# Patient Record
Sex: MALE | Race: WHITE | NOT HISPANIC OR LATINO | Employment: OTHER | URBAN - METROPOLITAN AREA
[De-identification: names, ages, dates, MRNs, and addresses within clinical notes are randomized per-mention and may not be internally consistent; named-entity substitution may affect disease eponyms.]

---

## 2017-06-10 ENCOUNTER — TRANSCRIBE ORDERS (OUTPATIENT)
Dept: ADMINISTRATIVE | Facility: HOSPITAL | Age: 57
End: 2017-06-10

## 2017-06-10 ENCOUNTER — APPOINTMENT (OUTPATIENT)
Dept: LAB | Facility: HOSPITAL | Age: 57
End: 2017-06-10
Payer: COMMERCIAL

## 2017-06-10 DIAGNOSIS — Z51.81 ENCOUNTER FOR THERAPEUTIC DRUG MONITORING: Primary | ICD-10-CM

## 2017-06-10 DIAGNOSIS — Z51.81 ENCOUNTER FOR THERAPEUTIC DRUG MONITORING: ICD-10-CM

## 2017-06-10 LAB — VENIPUNCTURE: NORMAL

## 2017-06-10 PROCEDURE — 36415 COLL VENOUS BLD VENIPUNCTURE: CPT

## 2017-06-12 ENCOUNTER — GENERIC CONVERSION - ENCOUNTER (OUTPATIENT)
Dept: OTHER | Facility: OTHER | Age: 57
End: 2017-06-12

## 2017-06-22 ENCOUNTER — ALLSCRIPTS OFFICE VISIT (OUTPATIENT)
Dept: OTHER | Facility: OTHER | Age: 57
End: 2017-06-22

## 2017-07-24 ENCOUNTER — ALLSCRIPTS OFFICE VISIT (OUTPATIENT)
Dept: OTHER | Facility: OTHER | Age: 57
End: 2017-07-24

## 2017-07-26 ENCOUNTER — LAB CONVERSION - ENCOUNTER (OUTPATIENT)
Dept: OTHER | Facility: OTHER | Age: 57
End: 2017-07-26

## 2017-07-26 ENCOUNTER — GENERIC CONVERSION - ENCOUNTER (OUTPATIENT)
Dept: OTHER | Facility: OTHER | Age: 57
End: 2017-07-26

## 2017-07-26 LAB
A/G RATIO (HISTORICAL): 1.6 (CALC) (ref 1–2.5)
ALBUMIN SERPL BCP-MCNC: 4.3 G/DL (ref 3.6–5.1)
ALP SERPL-CCNC: 52 U/L (ref 40–115)
ALT SERPL W P-5'-P-CCNC: 15 U/L (ref 9–46)
AST SERPL W P-5'-P-CCNC: 14 U/L (ref 10–35)
BASOPHILS # BLD AUTO: 1.3 %
BASOPHILS # BLD AUTO: 94 CELLS/UL (ref 0–200)
BILIRUB SERPL-MCNC: 0.7 MG/DL (ref 0.2–1.2)
BUN SERPL-MCNC: 10 MG/DL (ref 7–25)
BUN/CREA RATIO (HISTORICAL): NORMAL (CALC) (ref 6–22)
CALCIUM SERPL-MCNC: 9.5 MG/DL (ref 8.6–10.3)
CHLORIDE SERPL-SCNC: 106 MMOL/L (ref 98–110)
CHOLEST SERPL-MCNC: 249 MG/DL (ref 125–200)
CHOLEST/HDLC SERPL: 7.8 (CALC)
CO2 SERPL-SCNC: 25 MMOL/L (ref 20–31)
CREAT SERPL-MCNC: 0.92 MG/DL (ref 0.7–1.33)
DEPRECATED RDW RBC AUTO: 14.1 % (ref 11–15)
EGFR AFRICAN AMERICAN (HISTORICAL): 106 ML/MIN/1.73M2
EGFR-AMERICAN CALC (HISTORICAL): 91 ML/MIN/1.73M2
EOSINOPHIL # BLD AUTO: 295 CELLS/UL (ref 15–500)
EOSINOPHIL # BLD AUTO: 4.1 %
GAMMA GLOBULIN (HISTORICAL): 2.7 G/DL (CALC) (ref 1.9–3.7)
GLUCOSE (HISTORICAL): 95 MG/DL (ref 65–99)
HCT VFR BLD AUTO: 42.6 % (ref 38.5–50)
HDLC SERPL-MCNC: 32 MG/DL
HGB BLD-MCNC: 13.9 G/DL (ref 13.2–17.1)
LDL CHOLESTEROL (HISTORICAL): 147 MG/DL (CALC)
LYME IGG/IGM AB (HISTORICAL): <0.9 INDEX
LYMPHOCYTES # BLD AUTO: 2743 CELLS/UL (ref 850–3900)
LYMPHOCYTES # BLD AUTO: 38.1 %
MCH RBC QN AUTO: 29.1 PG (ref 27–33)
MCHC RBC AUTO-ENTMCNC: 32.6 G/DL (ref 32–36)
MCV RBC AUTO: 89.1 FL (ref 80–100)
MONOCYTES # BLD AUTO: 634 CELLS/UL (ref 200–950)
MONOCYTES (HISTORICAL): 8.8 %
NEUTROPHILS # BLD AUTO: 3434 CELLS/UL (ref 1500–7800)
NEUTROPHILS # BLD AUTO: 47.7 %
NON-HDL-CHOL (CHOL-HDL) (HISTORICAL): 217 MG/DL (CALC)
PLATELET # BLD AUTO: 312 THOUSAND/UL (ref 140–400)
PMV BLD AUTO: 10.1 FL (ref 7.5–12.5)
POTASSIUM SERPL-SCNC: 4.2 MMOL/L (ref 3.5–5.3)
PROSTATE SPECIFIC ANTIGEN TOTAL (HISTORICAL): 3 NG/ML
RBC # BLD AUTO: 4.78 MILLION/UL (ref 4.2–5.8)
SODIUM SERPL-SCNC: 140 MMOL/L (ref 135–146)
TOTAL PROTEIN (HISTORICAL): 7 G/DL (ref 6.1–8.1)
TRIGL SERPL-MCNC: 348 MG/DL
WBC # BLD AUTO: 7.2 THOUSAND/UL (ref 3.8–10.8)

## 2017-08-31 ENCOUNTER — ALLSCRIPTS OFFICE VISIT (OUTPATIENT)
Dept: OTHER | Facility: OTHER | Age: 57
End: 2017-08-31

## 2017-11-10 ENCOUNTER — ALLSCRIPTS OFFICE VISIT (OUTPATIENT)
Dept: OTHER | Facility: OTHER | Age: 57
End: 2017-11-10

## 2017-11-11 NOTE — PROGRESS NOTES
Assessment    1  Acute sinusitis (461 9) (J01 90)    Plan  Acute sinusitis    · Sulfamethoxazole-Trimethoprim 800-160 MG Oral Tablet (Bactrim DS); TAKE 1TABLET TWICE DAILY    Chief Complaint  Sinus congestion at times harsh cough headache achy no fever rmklpn      History of Present Illness  HPI: Pt states he has been spitting up phlem states his sinuses have been hurtinghe has been sick 5 days chills have been bad no fever at this time  Review of Systems   Constitutional: feeling poorly, but-- as noted in HPI   ENT: nasal discharge-- and-- hoarseness, but-- as noted in HPI  Cardiovascular: no complaints of slow or fast heart rate, no chest pain, no palpitations, no leg claudication or lower extremity edema  Respiratory: cough, but-- as noted in HPI  Gastrointestinal: no complaints of abdominal pain, no constipation, no nausea or vomiting, no diarrhea or bloody stools  Genitourinary: no complaints of dysuria or incontinence, no hesitancy, no nocturia, no genital lesion, no inadequacy of penile erection  Active Problems  1  Acute Tear Of Left Rotator Cuff Tendon (726 19)   2  Adrenal adenoma (227 0) (D35 00)   3  Allergic rhinitis (477 9) (J30 9)   4  Backache (724 5) (M54 9)   5  Benign essential hypertension (401 1) (I10)   6  Bicipital tendinitis of left shoulder (726 12) (M75 22)   7  Diarrhea (787 91) (R19 7)   8  Encounter for medication monitoring (V58 83) (Z51 81)   9  Encounter for pre-employment health screening examination (V70 5) (Z02 1)   10  Erythema migrans (Lyme disease) (088 81) (A69 20)   11  Fatigue (780 79) (R53 83)   12  OSITO (generalized anxiety disorder) (300 02) (F41 1)   13  Hypercholesterolemia (272 0) (E78 00)   14  Keloid of skin (701 4) (L91 0)   15  Nonallopathic lesion of lumbar region (739 3) (M99 9)   16  Palpitations (785 1) (R00 2)   17  Plantar fasciitis (728 71) (M72 2)   18  Prostate hyperplasia without urinary obstruction (600 90) (N40 0)   19   Recurrent major depressive disorder (296 30) (F33 9)   20  Rotator cuff tendinitis, left (726 10) (M11 82)   21  Shoulder joint pain, unspecified laterality   22  Shoulder Strain (840 9)   23  Visit for pre-operative examination (V72 84) (S45 791)    Past Medical History  1  History of Anxiety (300 00) (F41 9)   2  History of depression (V11 8) (Z86 59)   3  History of Inguinal hernia (550 90) (K40 90)   4  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)  Active Problems And Past Medical History Reviewed: The active problems and past medical history were reviewed and updated today  Family History  Father    1  Family history of Depression  Maternal Aunt    2  Family history of Pure Hypercholesterolemia   3  Family history of Stroke Syndrome (V17 1)  Family History    4  Family history of Cervical Cancer  Family History Reviewed: The family history was reviewed and updated today  Social History   · Being A Social Drinker   · Current Every Day Smoker (305 1)   · Current Some Day Smoker (305 1)   · Daily Coffee Consumption (___ Cups/Day)  The social history was reviewed and updated today  Surgical History    1  History of Knee Surgery  Surgical History Reviewed: The surgical history was reviewed and updated today  Current Meds   1  ClonazePAM 2 MG Oral Tablet; One tab twice daily; Therapy: 34WNC2733 to (Last Tomi Schaffer)  Requested for: 07WJZ4692 Ordered   2  DULoxetine HCl - 60 MG Oral Capsule Delayed Release Particles; TAKE ONE CAPSULE BY MOUTH ONE TIME DAILY; Therapy: 40GJO8953 to (Evaluate:43Tqj0764)  Requested for: 33QVR9426; Last Rx:22Jun2017 Ordered   3  Lithium Carbonate  MG Oral Tablet Extended Release; Take 1 tablet twice daily; Therapy: 68PZD6087 to (Last Tomi Schaffer)  Requested for: 55LCR9571 Ordered   4  QUEtiapine Fumarate 200 MG Oral Tablet; 1 5 tablets at bedtime; Therapy: 55DAT4423 to (Last Rx:25Oct2017)  Requested for: 25Oct2017 Ordered    Allergies  1  AMOXICILLIN   2   Advil TABS    Vitals   Recorded: 11VYG1934 01:30PM   Temperature 97 2 F   Heart Rate 78   Respiration 18   Systolic 117   Diastolic 80   Height 5 ft 11 in   Weight 234 lb    BMI Calculated 32 64   BSA Calculated 2 25       Physical Exam   Constitutional  General appearance: No acute distress, well appearing and well nourished  Eyes  Conjunctiva and lids: No swelling, erythema, or discharge  Pupils and irises: Equal, round and reactive to light  Ears, Nose, Mouth, and Throat  External inspection of ears and nose: Normal    Otoscopic examination: Abnormal   The right tympanic membrane was bulging  The left tympanic membrane was bulging  Nasal mucosa, septum, and turbinates: Abnormal   The bilateral nasal mucosa was red  Oropharynx: Abnormal   The posterior pharynx was erythematous  Pulmonary  Auscultation of lungs: Clear to auscultation, equal breath sounds bilaterally, no wheezes, no rales, no rhonci  Cardiovascular  Auscultation of heart: Normal rate and rhythm, normal S1 and S2, without murmurs  Abdomen  Abdomen: Non-tender, no masses           Future Appointments    Date/Time Provider Specialty Site   12/04/2017 04:10 PM Rianna Holly MD Psychiatry ST [address] [place] [address]       Signatures   Electronically signed by : Judson Aaron DO; Nov 10 2017  1:40PM EST                       (Author)

## 2018-01-11 NOTE — PSYCH
Psych Med Mgmt    Appearance: was calm and cooperative and good eye contact  Observed mood: euthymic  Observed mood: affect was broad  Speech: a normal rate  Thought processes: coherent/organized  Hallucinations: no hallucinations present  Thought Content: no delusions  Abnormal Thoughts: The patient has no suicidal thoughts  Orientation: The patient is oriented to person, place and time  Attention Span And Concentration: concentration intact  Insight: Insight intact  Treatment Recommendations: ACC/AHA Risk assessment tool for stroke was discussed with the patient  His score was 10 5  Continue with exercise  Discuss pharmacotherapy for hypercholesterolemia with primary care physician  Check lithium level  Return to the office in a few months  Risks, Benefits And Possible Side Effects Of Medications: Risks, benefits, and possible side effects of medications explained to patient and patient verbalizes understanding  He reports normal appetite, normal energy level, no weight change and normal number of sleep hours  The patient was seen for anxiety and depression  He spent several months in [place] with his wife and as a result could not keep his appointment in September but he has been doing well  He is sleeps restfully but he needs to take the Seroquel  He has a good appetite but has not been able to lose weight despite exercising a lot  His mood has been stable  Vitals  Signs   Recorded: 28Dec2016 02:54PM   Heart Rate: 638  Systolic: 706  Diastolic: 86  Weight: 857 lb   BMI Calculated: 31 66  BSA Calculated: 2 23    Assessment    1  OSITO (generalized anxiety disorder) (300 02) (F41 1)   2  Recurrent major depressive disorder (296 30) (F33 9)    Plan    1  (1) LITHIUM; Status:Active; Requested for:28Dec2016;     2  ClonazePAM 2 MG Oral Tablet; One tab twice daily    3  Lithium Carbonate  MG Oral Tablet Extended Release; Take 1 tablet twice   daily   4   DULoxetine HCl - 60 MG Oral Capsule Delayed Release Particles; TAKE ONE   CAPSULE BY MOUTH ONE TIME DAILY   5  QUEtiapine Fumarate 400 MG Oral Tablet; TAKE 1 TABLET AT BEDTIME    Review of Systems    Constitutional: No fever, no chills, feels well, no tiredness, no recent weight gain or loss  Cardiovascular: no complaints of slow or fast heart rate, no chest pain, no palpitations  Respiratory: no complaints of shortness of breath, no wheezing, no dyspnea on exertion  Gastrointestinal: no complaints of abdominal pain, no constipation, no nausea, no diarrhea, no vomiting  Genitourinary: no complaints of dysuria, no incontinence, no pelvic pain, no urinary frequency  Musculoskeletal: no complaints of arthralgia, no myalgias, no limb pain, no joint stiffness  Active Problems    1  Acute Tear Of Left Rotator Cuff Tendon (726 19)   2  Adrenal adenoma (227 0) (D35 00)   3  Allergic rhinitis (477 9) (J30 9)   4  Backache (724 5) (M54 9)   5  Benign essential hypertension (401 1) (I10)   6  Bicipital tendinitis of left shoulder (726 12) (M75 22)   7  Diarrhea (787 91) (R19 7)   8  Encounter for medication monitoring (V58 83) (Z51 81)   9  Encounter for pre-employment health screening examination (V70 5) (Z02 1)   10  Fatigue (780 79) (R53 83)   11  OSITO (generalized anxiety disorder) (300 02) (F41 1)   12  Hypercholesterolemia (272 0) (E78 00)   13  Nonallopathic lesion of lumbar region (739 3) (M99 9)   14  Palpitations (785 1) (R00 2)   15  Prostate hyperplasia without urinary obstruction (600 90) (N40 0)   16  Recurrent major depressive disorder (296 30) (F33 9)   17  Rotator cuff tendinitis, left (726 10) (M75 82)   18  Shoulder joint pain, unspecified laterality   19  Shoulder Strain (840 9)   20  Visit for pre-operative examination (V72 84) (M45 555)    Past Medical History    1  History of Anxiety (300 00) (F41 9)   2  History of depression (V11 8) (Z86 59)   3  History of Inguinal hernia (550 90) (K40 90)   4   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)    Allergies    1  AMOXICILLIN   2  Advil TABS    Current Meds   1  Amlodipine Besy-Benazepril HCl - 10-40 MG Oral Capsule; 1 every day; Therapy: 16ZTT8754 to (Last Rx:07Yzm7842)  Requested for: 12Lhh1553 Ordered   2  ClonazePAM 2 MG Oral Tablet; One tab twice daily; Therapy: 46WDV0154 to (Last Rx:24Got8424)  Requested for: 29TKE2723 Ordered   3  DULoxetine HCl - 60 MG Oral Capsule Delayed Release Particles; TAKE ONE CAPSULE   BY MOUTH ONE TIME DAILY; Therapy: 21XOY3610 to (Evaluate:15Jan2017)  Requested for: 01OEZ8781; Last   Rx:46Qyb8573 Ordered   4  QUEtiapine Fumarate 400 MG Oral Tablet; TAKE 1 TABLET AT BEDTIME  Requested for:   35DPM0857; Last Rx:19Dei1128 Ordered    Family Psych History  Father    1  Family history of Depression  Maternal Aunt    2  Family history of Pure Hypercholesterolemia   3  Family history of Stroke Syndrome (V17 1)  Family History    4  Family history of Cervical Cancer    Social History    · Being A Social Drinker   · Current Every Day Smoker (305 1)   · Current Some Day Smoker (305 1)   · Daily Coffee Consumption (___ Cups/Day)    End of Encounter Meds    1  Amlodipine Besy-Benazepril HCl - 10-40 MG Oral Capsule; 1 every day; Therapy: 12RPJ8233 to (Last Rx:87Ctw7967)  Requested for: 66Qpq6453 Ordered    2  ClonazePAM 2 MG Oral Tablet; One tab twice daily; Therapy: 41GMS5489 to (Last Rx:05Eqi0691)  Requested for: 86Hrm4288 Ordered    3  DULoxetine HCl - 60 MG Oral Capsule Delayed Release Particles; TAKE ONE CAPSULE   BY MOUTH ONE TIME DAILY; Therapy: 66QNS6998 to (Evaluate:26Jun2017)  Requested for: 95Wno7947; Last   Rx:03Rti2634 Ordered   4  Lithium Carbonate  MG Oral Tablet Extended Release; Take 1 tablet twice daily; Therapy: 36Mwv4455 to (Last Rx:77Cqc4350)  Requested for: 44Srv2745 Ordered   5   QUEtiapine Fumarate 400 MG Oral Tablet; TAKE 1 TABLET AT BEDTIME  Requested for:   10Qfg8409; Last Rx:50Mmk8982 Ordered    Signatures   Electronically signed by : Elvis Novak MD; Dec 28 2016  3:01PM EST                       (Author)

## 2018-01-11 NOTE — MISCELLANEOUS
Message   Recorded as Task   Date: 05/10/2016 10:57 AM, Created By: Richa Barry   Task Name: Call Back   Assigned To: Wesley Rolon   Regarding Patient: William Kat, Status: Active   CommentGwyneliud Phillip - 10 May 2016 10:57 AM     TASK CREATED    Can pt increase cymbolta, is on 60mg now 67 788 02 83 or  is spouse Delgado Roldan - 10 May 2016 3:45 PM     TASK EDITED   spoke with pt  has a lot of anxiety and sadness  variable sleep pattern and appetite  no suicidal thoughts  some crying spells  The patient is currently on Cymbalta 60 mg daily and Seroquel 300 mg at at bedtime   I instructed him to take 450 mg of Seroquel and call me next week      Plan  Recurrent major depressive disorder    · DULoxetine HCl - 60 MG Oral Capsule Delayed Release Particles; TAKE ONE  CAPSULE BY MOUTH ONE TIME DAILY    Signatures   Electronically signed by : Ev Griggs MD; May 11 2016  4:46PM EST                       (Author)

## 2018-01-12 VITALS
SYSTOLIC BLOOD PRESSURE: 126 MMHG | RESPIRATION RATE: 16 BRPM | HEART RATE: 82 BPM | BODY MASS INDEX: 31.92 KG/M2 | DIASTOLIC BLOOD PRESSURE: 82 MMHG | WEIGHT: 228 LBS | HEIGHT: 71 IN | TEMPERATURE: 98.8 F

## 2018-01-12 NOTE — RESULT NOTES
Verified Results  (Q) CULTURE, STOOL, SAL/SHIG/CAMPY AND SHIGA TOXINS EIA W/RFL E COLI O157 CULT [id number] 09:28AM Jocelyn Silvestre     Test Name Result Flag Reference   SHIGA TOXINS, EIA W/RFL$TO E COLI O157 CULTURE      SHIGA TOXINS, EIA W/RFL TO E COLI O157 CULTURE         MICRO NUMBER:      [id number]    TEST STATUS:       FINAL    SPECIMEN SOURCE:   STOOL    SPECIMEN QUALITY:  ADEQUATE    RESULT:            Not Detected   CULTURE, STOOL, ZAIN/SHIG/CAMPY AND SHIGA TOXINS EIA W/RFL E COLI O157 CULT      CAMPYLOBACTER, CULTURE         MICRO NUMBER:      [id number]    TEST STATUS:       FINAL    SPECIMEN SOURCE:   STOOL    SPECIMEN QUALITY:  ADEQUATE    RESULT:            No enteric Campylobacter isolated   CULTURE, STOOL, ZAIN/SHIG/CAMPY AND SHIGA TOXINS EIA W/RFL E COLI O157 CULT      SALMONELLA AND SHIGELLA, CULTURE         MICRO NUMBER:      [id number]    TEST STATUS:       FINAL    SPECIMEN SOURCE:   STOOL    SPECIMEN QUALITY:  ADEQUATE    RESULT:            No Salmonella or Shigella isolated

## 2018-01-12 NOTE — MISCELLANEOUS
Message  Pt called and frustrated he is "no better" despite cymbalta and seroquel  No SI  WIll increase cymbalta to 90 mg total per ay and continue seroquel- has follow up in 2 weeks with Dr Shanae Vasquez        Plan  Recurrent major depressive disorder    · From  DULoxetine HCl - 30 MG Oral Capsule Delayed Release Particles Take  one capsule a day for 7 days To DULoxetine HCl - 30 MG Oral Capsule Delayed  Release Particles take one capsule po qd   · DULoxetine HCl - 60 MG Oral Capsule Delayed Release Particles; TAKE ONE  CAPSULE BY MOUTH ONE TIME DAILY    Signatures   Electronically signed by : Mumtaz Luna, [facility]; May 27 2016  4:23PM EST                       (Author)

## 2018-01-12 NOTE — PROGRESS NOTES
Assessment    1  Inguinal hernia (550 90) (K40 90)    Plan  Inguinal hernia    · Follow-up visit in 3 months Evaluation and Treatment  Follow-up  Status: Hold For -  Scheduling  Requested for: 21Jan2016   Ordered; For: Inguinal hernia; Ordered By: Cielo Campbell Performed:  Due: 69SDX8465  Mild depressive disorder    · Mirtazapine 15 MG Oral Tablet; TAKE 1 TABLET Bedtime   Rx By: Marii Mccabe; Dispense: 30 Days ; #:30 Tablet; Refill: 2; For: Mild depressive disorder; RICKIE = N; Verified Transmission to [facility] Last Updated By: System, [employer]; 1/20/2016 4:15:09 PM   · QUEtiapine Fumarate 200 MG Oral Tablet; TAKE 1 TABLET AT BEDTIME   Rx By: Marii Mccabe; Dispense: 30 Days ; #:30 Tablet; Refill: 2; For: Mild depressive disorder; RICKIE = N; Verified Transmission to [facility]9212 Last Updated By: System, [employer]; 1/20/2016 4:15:09 PM  PMH: Anxiety    · From  ClonazePAM 1 MG Oral Tablet 2 tab BID To ClonazePAM 2 MG Oral  Tablet One tab twice daily   Rx By: Marii Mccabe; Dispense: 0 Days ; #:60 Tablet; Refill: 2; For: PMH: Anxiety; RICKIE = N; Print Rx    Discussion/Summary  Discussion Summary:   Alice Diaz comes today after having right and left inguinal hernia repairs in October 2015  He is doing well  His physical examination is normal  He looks excellent  He is okay to resume all activity including going to the gymnasium  He has had an excellent postoperative recovery  He will call for any problems  Chief Complaint  Chief Complaint Free Text Note Form: Patient presents S/P left and right hernia 10/02/2015 and 10/09/2015  LB,CMA      History of Present Illness  HPI: Is of had a left and right inguinal hernia repairs in October of 2015  He comes today for his final check  He is doing well  He is retired  He does not do much heavy lifting  He is doing well he is having no problems  He is in good health otherwise his medications are reviewed        Review of Systems  Complete-Male: Constitutional: No fever or chills, feels well, no tiredness, no recent weight gain or weight loss  Eyes: No complaints of eye pain, no red eyes, no discharge from eyes, no itchy eyes  ENT: no complaints of earache, no hearing loss, no nosebleeds, no nasal discharge, no sore throat, no hoarseness  Cardiovascular: No complaints of slow heart rate, no fast heart rate, no chest pain, no palpitations, no leg claudication, no lower extremity and as noted in HPI  Respiratory: No complaints of shortness of breath, no wheezing, no cough, no SOB on exertion, no orthopnea or PND  Gastrointestinal: as noted in HPI  Genitourinary: No complaints of dysuria, no incontinence, no hesitancy, no nocturia, no genital lesion, no testicular pain  Musculoskeletal: No complaints of arthralgia, no myalgias, no joint swelling or stiffness, no limb pain or swelling  Integumentary: No complaints of skin rash or skin lesions, no itching, no skin wound, no dry skin  Neurological: No compliants of headache, no confusion, no convulsions, no numbness or tingling, no dizziness or fainting, no limb weakness, no difficulty walking  Psychiatric: Is not suicidal, no sleep disturbances, no anxiety or depression, no change in personality, no emotional problems  Endocrine: No complaints of proptosis, no hot flashes, no muscle weakness, no erectile dysfunction, no deepening of the voice, no feelings of weakness  Hematologic/Lymphatic: No complaints of swollen glands, no swollen glands in the neck, does not bleed easily, no easy bruising  Active Problems    1  Acute Tear Of Left Rotator Cuff Tendon (726 19)   2  Adrenal adenoma (227 0) (D35 00)   3  Allergic rhinitis (477 9) (J30 9)   4  Backache (724 5) (M54 9)   5  Benign essential hypertension (401 1) (I10)   6  Bicipital tendinitis of left shoulder (726 12) (M75 22)   7  Diarrhea (787 91) (R19 7)   8   Encounter for pre-employment health screening examination (V70 5) (Z02 1)   9  Fatigue (780 79) (R53 83)   10  OSITO (generalized anxiety disorder) (300 02) (F41 1)   11  Hypercholesterolemia (272 0) (E78 0)   12  Inguinal hernia (550 90) (K40 90)   13  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)   14  Nonallopathic lesion of lumbar region (739 3) (M99 03)   15  Palpitations (785 1) (R00 2)   16  Prostate hyperplasia without urinary obstruction (600 90) (N40 0)   17  Rotator cuff tendinitis, left (726 10) (M75 82)   18  Shoulder joint pain, unspecified laterality (719 41) (M25 519)   19  Shoulder Strain (840 9)   20  Visit for pre-operative examination (V72 84) (K44 425)    Past Medical History    1  History of Anxiety (300 00) (F41 9)   2  History of depression (V11 8) (Z86 59)    Surgical History    1  History of Knee Surgery    Family History    1  Family history of Depression    2  Family history of Hypercholesterolemia   3  Family history of Stroke Syndrome (V17 1)    4  Family history of Cervical Cancer    Social History    · Being A Social Drinker   · Current Every Day Smoker (305 1)   · Current Some Day Smoker (305 1)   · Daily Coffee Consumption (___ Cups/Day)    Current Meds   1  Amlodipine Besy-Benazepril HCl - 10-40 MG Oral Capsule; 1 every day; Therapy: 90DKK5043 to (Last Rx:33Ola6234)  Requested for: 37Nct2030 Ordered   2  ClonazePAM 1 MG Oral Tablet; 2 tab BID; Therapy: 65JDN7870-(GAOYOFVT:99LRJ2232)  Requested for: 23Okh7911 Ordered   3  Mirtazapine 30 MG Oral Tablet; TAKE 1 TABLET AT BEDTIME Recorded   4  QUEtiapine Fumarate 300 MG Oral Tablet; TAKE 1 TABLET AT BEDTIME Recorded  Medication List Reviewed: The medication list was reviewed and updated today  Allergies    1  AMOXICILLIN   2   Advil TABS    Vitals  Vital Signs [Data Includes: Current Encounter]    Recorded: 21Jan2016 01:56PM   Temperature 97 9 F   Heart Rate 98   Systolic 368   Diastolic 70   Height 5 ft 11 in   Weight 218 lb 6 08 oz   BMI Calculated 30 46   BSA Calculated 2 18   O2 Saturation 95 Pain Scale 0     Physical Exam    Abdomen   Abdomen: Non-tender, no masses  Hernia sites well-healed wound clean no evidence of recurrence  Signatures   Electronically signed by :  John Busby MD; Jan 21 2016  2:11PM EST                       (Author)

## 2018-01-12 NOTE — PSYCH
Psych Med Mgmt    Appearance: was calm and cooperative and good eye contact  Observed mood: euthymic  Observed mood: affect was broad  Speech: a normal rate  Thought processes: coherent/organized  Hallucinations: no hallucinations present  Thought Content: no delusions  Abnormal Thoughts: The patient has no suicidal thoughts and no homicidal thoughts  Orientation: The patient is oriented to person, place and time  Attention Span And Concentration: concentration intact  Insight: Insight intact  Judgment: His judgment was intact  Muscle Strength And Tone  Muscle strength and tone were normal  Normal gait and station  The patient is experiencing no localized pain  Treatment Recommendations:   clonazePAM 1 mg BID   DULoxetine 60 mg Daily   lithium carbonate 450 mg Q12H   QUEtiapine 400 mg HS     Risks, Benefits And Possible Side Effects Of Medications: Risks, benefits, and possible side effects of medications explained to patient and patient verbalizes understanding  He reports normal appetite, normal energy level, no weight change and normal number of sleep hours  The patient was seen for pharmacotherapy of recurrent depression and generalized anxiety disorder  Since he was discharged from the hospital a few weeks ago, he has continued to improve  His level of anxiety is low  He has been sleeping and eating well  No suicidal ideation  He gets along well with his wife  He has not had any side effects with his medications and seems to be in remission  Most recent lithium level from 2 weeks ago was 0 6  Assessment    1  OSITO (generalized anxiety disorder) (300 02) (F41 1)   2  Recurrent major depressive disorder (296 30) (F33 9)    Plan    1  ClonazePAM 2 MG Oral Tablet; One tab twice daily    2  DULoxetine HCl - 30 MG Oral Capsule Delayed Release Particles   3   From  QUEtiapine Fumarate 200 MG Oral Tablet One twice daily To   QUEtiapine Fumarate 400 MG Oral Tablet TAKE 1 TABLET AT BEDTIME   4  DULoxetine HCl - 60 MG Oral Capsule Delayed Release Particles; TAKE ONE   CAPSULE BY MOUTH ONE TIME DAILY    Review of Systems    Constitutional: No fever, no chills, feels well, no tiredness, no recent weight gain or loss  Cardiovascular: no complaints of slow or fast heart rate, no chest pain, no palpitations  Respiratory: no complaints of shortness of breath, no wheezing, no dyspnea on exertion  Gastrointestinal: no complaints of abdominal pain, no constipation, no nausea, no diarrhea, no vomiting  Genitourinary: no complaints of dysuria, no incontinence, no pelvic pain, no urinary frequency  Active Problems    1  Acute Tear Of Left Rotator Cuff Tendon (726 19)   2  Adrenal adenoma (227 0) (D35 00)   3  Allergic rhinitis (477 9) (J30 9)   4  Backache (724 5) (M54 9)   5  Benign essential hypertension (401 1) (I10)   6  Bicipital tendinitis of left shoulder (726 12) (M75 22)   7  Diarrhea (787 91) (R19 7)   8  Encounter for medication monitoring (V58 83) (Z51 81)   9  Encounter for pre-employment health screening examination (V70 5) (Z02 1)   10  Fatigue (780 79) (R53 83)   11  OSITO (generalized anxiety disorder) (300 02) (F41 1)   12  Hypercholesterolemia (272 0) (E78 0)   13  Nonallopathic lesion of lumbar region (739 3) (M99 9)   14  Palpitations (785 1) (R00 2)   15  Prostate hyperplasia without urinary obstruction (600 90) (N40 0)   16  Recurrent major depressive disorder (296 30) (F33 9)   17  Rotator cuff tendinitis, left (726 10) (M75 82)   18  Shoulder joint pain, unspecified laterality   19  Shoulder Strain (840 9)   20  Visit for pre-operative examination (V72 84) (C53 404)    Past Medical History    1  History of Anxiety (300 00) (F41 9)   2  History of depression (V11 8) (Z86 59)   3  History of Inguinal hernia (550 90) (K40 90)   4  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)    Allergies    1  AMOXICILLIN   2  Advil TABS    Current Meds   1   Amlodipine Besy-Benazepril HCl - 10-40 MG Oral Capsule; 1 every day; Therapy: 39XAV6399 to (Last Rx:61Agd6936)  Requested for: 63Clk2539 Ordered   2  ClonazePAM 2 MG Oral Tablet; One tab twice daily; Therapy: 33PEN3822 to (Last Rx:23Mar2016)  Requested for: 49ETX1895 Ordered   3  DULoxetine HCl - 30 MG Oral Capsule Delayed Release Particles; take one capsule po   qd; Therapy: 85SVO0574 to (Last Rx:70Iwy5135)  Requested for: 41GQK5212 Ordered   4  DULoxetine HCl - 60 MG Oral Capsule Delayed Release Particles; TAKE ONE CAPSULE   BY MOUTH ONE TIME DAILY; Therapy: 53CDM5793 to (Evaluate:60Jqe2232)  Requested for: 35Bgb4988; Last   Rx:39Qkl6458 Ordered   5  QUEtiapine Fumarate 200 MG Oral Tablet; One twice daily  Requested for: 53XSL9819;   Last Rx:44Gzm1476 Ordered    Family Psych History  Father    1  Family history of Depression  Maternal Aunt    2  Family history of Pure Hypercholesterolemia   3  Family history of Stroke Syndrome (V17 1)  Family History    4  Family history of Cervical Cancer    Social History    · Being A Social Drinker   · Current Every Day Smoker (305 1)   · Current Some Day Smoker (305 1)   · Daily Coffee Consumption (___ Cups/Day)    End of Encounter Meds    1  Amlodipine Besy-Benazepril HCl - 10-40 MG Oral Capsule; 1 every day; Therapy: 19TMP4012 to (Last Rx:42Inz9165)  Requested for: 38Rav6693 Ordered    2  ClonazePAM 2 MG Oral Tablet; One tab twice daily; Therapy: 62GRF0010 to (Last Rx:63Gvu5784)  Requested for: 74MMT7859 Ordered    3  DULoxetine HCl - 60 MG Oral Capsule Delayed Release Particles; TAKE ONE CAPSULE   BY MOUTH ONE TIME DAILY; Therapy: 35CNC7832 to (Evaluate:60Fda5901)  Requested for: 43IZP0329; Last   Rx:48Cbm7434 Ordered   4   QUEtiapine Fumarate 400 MG Oral Tablet; TAKE 1 TABLET AT BEDTIME  Requested for:   63GZZ4230; Last Rx:45Lhn1414 Ordered    Signatures   Electronically signed by : Fay Nicholson MD; Jul 19 2016  2:46PM EST                       (Author)

## 2018-01-13 VITALS
BODY MASS INDEX: 32.06 KG/M2 | HEIGHT: 71 IN | DIASTOLIC BLOOD PRESSURE: 96 MMHG | TEMPERATURE: 98.2 F | HEART RATE: 82 BPM | WEIGHT: 229 LBS | SYSTOLIC BLOOD PRESSURE: 124 MMHG | RESPIRATION RATE: 18 BRPM

## 2018-01-13 VITALS
SYSTOLIC BLOOD PRESSURE: 136 MMHG | BODY MASS INDEX: 32.76 KG/M2 | WEIGHT: 234 LBS | DIASTOLIC BLOOD PRESSURE: 80 MMHG | HEART RATE: 78 BPM | RESPIRATION RATE: 18 BRPM | TEMPERATURE: 97.2 F | HEIGHT: 71 IN

## 2018-01-13 NOTE — PSYCH
Psych Med Mgmt    Appearance: was calm and cooperative, adequate hygiene and grooming and good eye contact  Observed mood: euthymic, but mood appropriate  Observed mood: affect was broad, but affect appropriate  Speech: a normal rate and fluent  Thought processes: coherent/organized  Hallucinations: no hallucinations present  Thought Content: no delusions  Abnormal Thoughts: The patient has no suicidal thoughts and no homicidal thoughts  Orientation: The patient is oriented to person, place and time  Recent and Remote Memory: short term memory intact and long term memory intact  Insight: Insight intact  Judgment: His judgment was intact  Muscle Strength And Tone  Muscle strength and tone were normal  Normal gait and station  Language: no difficulty naming common objects  Treatment Recommendations: same meds  RTO in 4 month  Risks, Benefits And Possible Side Effects Of Medications: Risks, benefits, and possible side effects of medications explained to patient and patient verbalizes understanding  He reports normal appetite, normal energy level, no weight change and normal number of sleep hours  The patient was seen for continuing care and pharmacotherapy of major depression  He has been doing very well and does not offer any complaints  Sleep and appetite have been normal  He has not had any suicidal ideation  Since he has been feeling better, he has cut back on quetiapine and he takes only 300 mg at bedtime  Assessment    1  OSITO (generalized anxiety disorder) (300 02) (F41 1)   2  Recurrent major depressive disorder (296 30) (F33 9)    Plan    1  ClonazePAM 2 MG Oral Tablet; One tab twice daily    2  DULoxetine HCl - 60 MG Oral Capsule Delayed Release Particles; TAKE ONE   CAPSULE BY MOUTH ONE TIME DAILY   3  Lithium Carbonate  MG Oral Tablet Extended Release; Take 1 tablet   twice daily   4   QUEtiapine Fumarate 200 MG Oral Tablet; 1 5 tablets at bedtime    Review of Systems    Cardiovascular: no complaints of slow or fast heart rate, no chest pain, no palpitations  Respiratory: no complaints of shortness of breath, no wheezing, no dyspnea on exertion  Gastrointestinal: stomach discomfort  Genitourinary: no complaints of dysuria, no incontinence, no pelvic pain, no urinary frequency  Musculoskeletal: no complaints of arthralgia, no myalgias, no limb pain, no joint stiffness  Integumentary: no complaints of skin rash, no itching, no dry skin  Neurological: no complaints of headache, no confusion, no numbness, no dizziness  Active Problems    1  Acute Tear Of Left Rotator Cuff Tendon (726 19)   2  Adrenal adenoma (227 0) (D35 00)   3  Allergic rhinitis (477 9) (J30 9)   4  Backache (724 5) (M54 9)   5  Benign essential hypertension (401 1) (I10)   6  Bicipital tendinitis of left shoulder (726 12) (M75 22)   7  Diarrhea (787 91) (R19 7)   8  Encounter for medication monitoring (V58 83) (Z51 81)   9  Encounter for pre-employment health screening examination (V70 5) (Z02 1)   10  Fatigue (780 79) (R53 83)   11  OSITO (generalized anxiety disorder) (300 02) (F41 1)   12  Hypercholesterolemia (272 0) (E78 00)   13  Nonallopathic lesion of lumbar region (739 3) (M99 9)   14  Palpitations (785 1) (R00 2)   15  Prostate hyperplasia without urinary obstruction (600 90) (N40 0)   16  Recurrent major depressive disorder (296 30) (F33 9)   17  Rotator cuff tendinitis, left (726 10) (M75 82)   18  Shoulder joint pain, unspecified laterality   19  Shoulder Strain (840 9)   20  Visit for pre-operative examination (V72 84) (O20 456)    Past Medical History    1  History of Anxiety (300 00) (F41 9)   2  History of depression (V11 8) (Z86 59)   3  History of Inguinal hernia (550 90) (K40 90)   4  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)    Allergies    1  AMOXICILLIN   2  Advil TABS    Current Meds   1   Amlodipine Besy-Benazepril HCl - 10-40 MG Oral Capsule; 1 every day; Therapy: 84PKY1866 to (Last Rx:09Rxw8575)  Requested for: 13Mao6504 Ordered   2  ClonazePAM 2 MG Oral Tablet; One tab twice daily; Therapy: 78MGJ7207 to (Last Rx:37Sgy9743)  Requested for: 86Kmn6585 Ordered   3  DULoxetine HCl - 60 MG Oral Capsule Delayed Release Particles; TAKE ONE CAPSULE   BY MOUTH ONE TIME DAILY; Therapy: 79BDR5497 to (Evaluate:26Jun2017)  Requested for: 54Aun7025; Last   Rx:43Vhi3161 Ordered   4  Lithium Carbonate  MG Oral Tablet Extended Release; Take 1 tablet twice daily; Therapy: 30Rhe8099 to (Last Rx:57Uke7798)  Requested for: 25Jzw3690 Ordered   5  QUEtiapine Fumarate 200 MG Oral Tablet; take 1 tablet by mouth twice a day; Therapy: 61Vqp3143 to (Desirae Blunt)  Requested for: 03Apr2017; Last   Rx:03Apr2017 Ordered    Family Psych History  Father    1  Family history of Depression  Maternal Aunt    2  Family history of Pure Hypercholesterolemia   3  Family history of Stroke Syndrome (V17 1)  Family History    4  Family history of Cervical Cancer    Social History    · Being A Social Drinker   · Current Every Day Smoker (305 1)   · Current Some Day Smoker (305 1)   · Daily Coffee Consumption (___ Cups/Day)    End of Encounter Meds    1  Amlodipine Besy-Benazepril HCl - 10-40 MG Oral Capsule; 1 every day; Therapy: 22FSX6982 to (Last Rx:87Vpb5702)  Requested for: 18Jtv4008 Ordered    2  ClonazePAM 2 MG Oral Tablet; One tab twice daily; Therapy: 66NAV9195 to (Last Darene Brought)  Requested for: 09YLR2330 Ordered    3  DULoxetine HCl - 60 MG Oral Capsule Delayed Release Particles; TAKE ONE CAPSULE   BY MOUTH ONE TIME DAILY; Therapy: 23UDC6958 to (Evaluate:93Cqt5987)  Requested for: 13KIF4318; Last   Rx:22Jun2017 Ordered   4  Lithium Carbonate  MG Oral Tablet Extended Release; Take 1 tablet twice daily; Therapy: 56YZI2466 to (Last Darene Brought)  Requested for: 67HFD2579 Ordered   5  QUEtiapine Fumarate 200 MG Oral Tablet; 1 5 tablets at bedtime;    Therapy: 33WOE2405 to (Last Randell Decent)  Requested for: 50YDM0736 Ordered    Future Appointments    Date/Time Provider Specialty Site   10/10/2017 02:30 PM Herb Lara MD Psychiatry [facility] [address] [place] [address]     Signatures   Electronically signed by : Elvis Novak MD; Jun 22 2017  4:47PM EST                       (Author)

## 2018-01-13 NOTE — RESULT NOTES
Verified Results  (1) LITHIUM [id number] 08:45AM [facility]     Test Name Result Flag Reference   LITHIUM 0 4 mmol/L L 0 6-1 2

## 2018-01-13 NOTE — PSYCH
Psych Med Mgmt    Appearance: was calm and cooperative, adequate hygiene and grooming and good eye contact  Observed mood: euthymic  Observed mood: affect was broad  Speech: a normal rate  Thought processes: coherent/organized  Hallucinations: no hallucinations present  Thought Content: no delusions  Abnormal Thoughts: The patient has no suicidal thoughts and no homicidal thoughts  Orientation: The patient is oriented to person, place and time  Attention Span And Concentration: concentration intact  Insight: Insight intact  Judgment: His judgment was intact  Muscle Strength And Tone  Muscle strength and tone were normal  Normal gait and station  The patient is experiencing no localized pain  Treatment Recommendations: Continue with the following medications:  Lithium carbonate 450 mg twice daily  Quetiapine 400 mg at bedtime  Clonazepam 1 mg twice daily  Cymbalta 60 mg daily  Lithium level in 2 weeks  Referred to 2 different psychotherapists in [facility] area  Return to office in one month  Risks, Benefits And Possible Side Effects Of Medications: Risks, benefits, and possible side effects of medications explained to patient and patient verbalizes understanding  He reports normal appetite, normal energy level, no weight change and normal number of sleep hours  Posthospitalization visit  Discharged from older adult unit on June 8 after several days of inpatient treatment for significant depression  He was treated with a combination of lithium and Cymbalta and maintained on Seroquel and clonazepam  He responded favorably  He has remained in remission  Assessment    1  Recurrent major depressive disorder (296 30) (F33 9)   2  OSITO (generalized anxiety disorder) (300 02) (F41 1)    Plan    1  (1) LITHIUM; Status:Active; Requested [facility]:06QWT7003;     Review of Systems    Constitutional: No fever, no chills, feels well, no tiredness, no recent weight gain or loss  Cardiovascular: no complaints of slow or fast heart rate, no chest pain, no palpitations  Respiratory: no complaints of shortness of breath, no wheezing, no dyspnea on exertion  Gastrointestinal: loose stools  Genitourinary: no complaints of dysuria, no incontinence, no pelvic pain, no urinary frequency  Active Problems    1  Acute Tear Of Left Rotator Cuff Tendon (726 19)   2  Adrenal adenoma (227 0) (D35 00)   3  Allergic rhinitis (477 9) (J30 9)   4  Backache (724 5) (M54 9)   5  Benign essential hypertension (401 1) (I10)   6  Bicipital tendinitis of left shoulder (726 12) (M75 22)   7  Diarrhea (787 91) (R19 7)   8  Encounter for pre-employment health screening examination (V70 5) (Z02 1)   9  Fatigue (780 79) (R53 83)   10  OSITO (generalized anxiety disorder) (300 02) (F41 1)   11  Hypercholesterolemia (272 0) (E78 0)   12  Nonallopathic lesion of lumbar region (739 3) (M99 9)   13  Palpitations (785 1) (R00 2)   14  Prostate hyperplasia without urinary obstruction (600 90) (N40 0)   15  Recurrent major depressive disorder (296 30) (F33 9)   16  Rotator cuff tendinitis, left (726 10) (M75 82)   17  Shoulder joint pain, unspecified laterality   18  Shoulder Strain (840 9)   19  Visit for pre-operative examination (V72 84) (F03 376)    Past Medical History    1  History of Anxiety (300 00) (F41 9)   2  History of depression (V11 8) (Z86 59)   3  History of Inguinal hernia (550 90) (K40 90)   4  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)    Allergies    1  AMOXICILLIN   2  Advil TABS    Current Meds   1  Amlodipine Besy-Benazepril HCl - 10-40 MG Oral Capsule; 1 every day; Therapy: 88BEU1402 to (Last Rx:60Bkf1632)  Requested for: 52Vmv0367 Ordered   2  ClonazePAM 2 MG Oral Tablet; One tab twice daily; Therapy: 54BYJ8484 to (Last Rx:23Mar2016)  Requested for: 83QYM0085 Ordered   3  DULoxetine HCl - 30 MG Oral Capsule Delayed Release Particles; take one capsule po   qd;    Therapy: 30BKR5110 to (Last Rx:10Osa3559)  Requested for: 72COX2967 Ordered   4  DULoxetine HCl - 60 MG Oral Capsule Delayed Release Particles; TAKE ONE CAPSULE   BY MOUTH ONE TIME DAILY; Therapy: 43MXG0531 to (Evaluate:25Oxy3957)  Requested for: 25Apr2016; Last   Rx:25Apr2016 Ordered   5  QUEtiapine Fumarate 200 MG Oral Tablet; One twice daily  Requested for: 90JOV4747;   Last Rx:08Jun2016 Ordered    Family Psych History  Father    1  Family history of Depression  Maternal Aunt    2  Family history of Hypercholesterolemia   3  Family history of Stroke Syndrome (V17 1)  Family History    4  Family history of Cervical Cancer    Social History    · Being A Social Drinker   · Current Every Day Smoker (305 1)   · Current Some Day Smoker (305 1)   · Daily Coffee Consumption (___ Cups/Day)    End of Encounter Meds    1  Amlodipine Besy-Benazepril HCl - 10-40 MG Oral Capsule; 1 every day; Therapy: 61SVY3257 to (Last Rx:65Lof8314)  Requested for: 52Eja0217 Ordered    2  ClonazePAM 2 MG Oral Tablet; One tab twice daily; Therapy: 22OOV2165 to (Last Rx:23Mar2016)  Requested for: 82HPN0453 Ordered    3  DULoxetine HCl - 30 MG Oral Capsule Delayed Release Particles; take one capsule po   qd; Therapy: 91JUP5756 to (Last Rx:27May2016)  Requested for: 78UCM3498 Ordered   4  DULoxetine HCl - 60 MG Oral Capsule Delayed Release Particles; TAKE ONE CAPSULE   BY MOUTH ONE TIME DAILY; Therapy: 35MRZ7017 to (Evaluate:29Bkb2990)  Requested for: 25Apr2016; Last   Rx:25Apr2016 Ordered   5  QUEtiapine Fumarate 200 MG Oral Tablet;  One twice daily  Requested for: 43IGT3275;   Last Rx:08Jun2016 Ordered    Signatures   Electronically signed by : Teena Ramirez MD; Jun 14 2016  2:48PM EST                       (Author)

## 2018-01-14 NOTE — PSYCH
Psych Med Mgmt    Appearance: was calm and cooperative, adequate hygiene and grooming and good eye contact  Observed mood: "up and down", but mood appropriate  Observed mood: affect was broad  Speech: a normal rate  Thought processes: coherent/organized  Hallucinations: no hallucinations present  Thought Content: no delusions  Abnormal Thoughts: The patient has no suicidal thoughts and no homicidal thoughts  Orientation: The patient is oriented to person, place and time  Recent and Remote Memory: short term memory intact and long term memory intact  Attention Span And Concentration: concentration intact  Insight: Insight intact  Judgment: His judgment was intact  Muscle Strength And Tone  Muscle strength and tone were normal  Normal gait and station  The patient is experiencing no localized pain  Treatment Recommendations: Due to lack of adequate response to mirtazapine 15 mg and excessive sedation with 30 mg, we will try a combination of bupropion and mirtazapine  The patient does not have a history of epilepsy or eating disorders He will start with 150 mg daily for 7 days to be increased to 300 mg each bedtime  He will continue with quetiapine 200 mg at bedtime as well as clonazepam 2 mg twice daily  I will see him in 6 weeks  Risks, Benefits And Possible Side Effects Of Medications: Risks, benefits, and possible side effects of medications explained to patient and patient verbalizes understanding  The patient was seen for continuing care and pharmacotherapy  He cut back on Remeron because of tiredness but he became very anxious and depressed and went back on 30 mg daily and remains tired although not anxious  He is more unmotivated and depressed but not suicidal  He has gained a few pounds  Vitals  Signs [Data Includes: Current Encounter]   Recorded: 65Nym1372 03:47PM   Weight: 227 lb   BMI Calculated: 31 66  BSA Calculated: 2 23    Assessment    1   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)    Plan    1  BuPROPion HCl ER (XL) 150 MG Oral Tablet Extended Release 24 Hour; TAKE 1   TABLET DAILY UNTIL FINISHED   2  BuPROPion HCl ER (XL) 300 MG Oral Tablet Extended Release 24 Hour; TAKE 1   TABLET DAILY   3  Mirtazapine 15 MG Oral Tablet; TAKE 1 TABLET Bedtime   4  QUEtiapine Fumarate 200 MG Oral Tablet; TAKE 1 TABLET AT BEDTIME    5  ClonazePAM 2 MG Oral Tablet; One tab twice daily    Review of Systems    Constitutional: feeling tired  Cardiovascular: no complaints of slow or fast heart rate, no chest pain, no palpitations  Respiratory: no complaints of shortness of breath, no wheezing, no dyspnea on exertion  Gastrointestinal: no complaints of abdominal pain, no constipation, no nausea, no diarrhea, no vomiting  Genitourinary: no complaints of dysuria, no incontinence, no pelvic pain, no urinary frequency  Musculoskeletal: no complaints of arthralgia, no myalgias, no limb pain, no joint stiffness  Integumentary: no complaints of skin rash, no itching, no dry skin  Neurological: no complaints of headache, no confusion, no numbness, no dizziness  and no headache  Active Problems    1  Acute Tear Of Left Rotator Cuff Tendon (726 19)   2  Adrenal adenoma (227 0) (D35 00)   3  Allergic rhinitis (477 9) (J30 9)   4  Backache (724 5) (M54 9)   5  Benign essential hypertension (401 1) (I10)   6  Bicipital tendinitis of left shoulder (726 12) (M75 22)   7  Diarrhea (787 91) (R19 7)   8  Encounter for pre-employment health screening examination (V70 5) (Z02 1)   9  Fatigue (780 79) (R53 83)   10  OSITO (generalized anxiety disorder) (300 02) (F41 1)   11  Hypercholesterolemia (272 0) (E78 0)   12  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)   13  Nonallopathic lesion of lumbar region (739 3) (M99 9)   14  Palpitations (785 1) (R00 2)   15  Prostate hyperplasia without urinary obstruction (600 90) (N40 0)   16  Rotator cuff tendinitis, left (726 10) (M75 82)   17   Shoulder joint pain, unspecified laterality   18  Shoulder Strain (840 9)   19  Visit for pre-operative examination (V72 84) (M79 393)    Past Medical History    1  History of Anxiety (300 00) (F41 9)   2  History of depression (V11 8) (Z86 59)   3  History of Inguinal hernia (550 90) (K40 90)    Allergies    1  AMOXICILLIN   2  Advil TABS    Current Meds   1  Amlodipine Besy-Benazepril HCl - 10-40 MG Oral Capsule; 1 every day; Therapy: 85ZJR4276 to (Last Rx:00Hqf0219)  Requested for: 28Dec2015 Ordered   2  ClonazePAM 2 MG Oral Tablet; One tab twice daily; Therapy: 56LSD6659 to (Evaluate:59Vjj9774)  Requested for: 20Jan2016; Last   Rx:20Jan2016 Ordered   3  Mirtazapine 15 MG Oral Tablet; TAKE 1 TABLET Bedtime  Requested for: 71CAT8456;   Last Rx:20Jan2016 Ordered   4  QUEtiapine Fumarate 200 MG Oral Tablet; TAKE 1 TABLET AT BEDTIME  Requested for:   20Jan2016; Last Rx:20Jan2016 Ordered    Family Psych History    1  Family history of Depression    2  Family history of Hypercholesterolemia   3  Family history of Stroke Syndrome (V17 1)    4  Family history of Cervical Cancer    Social History    · Being A Social Drinker   · Current Every Day Smoker (305 1)   · Current Some Day Smoker (305 1)   · Daily Coffee Consumption (___ Cups/Day)    End of Encounter Meds    1  Amlodipine Besy-Benazepril HCl - 10-40 MG Oral Capsule; 1 every day; Therapy: 15QYE7141 to (Last Rx:86Qdy6637)  Requested for: 28Dec2015 Ordered    2  BuPROPion HCl ER (XL) 150 MG Oral Tablet Extended Release 24 Hour; TAKE 1   TABLET DAILY UNTIL FINISHED; Therapy: 70MWO2739 to ([facility])  Requested for: 51Cyh7019; Last   Rx:79Bts8500 Ordered   3  BuPROPion HCl ER (XL) 300 MG Oral Tablet Extended Release 24 Hour; TAKE 1   TABLET DAILY; Therapy: 74FKG1910 to (Evaluate:80Fda5846)  Requested for: 82Ysm3615; Last   Rx:12Jdo7686 Ordered   4  Mirtazapine 15 MG Oral Tablet; TAKE 1 TABLET Bedtime  Requested for: 80Leh2893;   Last Rx:82Bzs4909 Ordered   5  QUEtiapine Fumarate 200 MG Oral Tablet; TAKE 1 TABLET AT BEDTIME  Requested for:   04Qer3267; Last Rx:24Feb2016 Ordered    6  ClonazePAM 2 MG Oral Tablet; One tab twice daily;    Therapy: 85OKM6775 to (Last Rx:24Feb2016)  Requested for: 62Lqi8163 Ordered    Signatures   Electronically signed by : Lupe Schroeder MD; Feb 24 2016  3:56PM EST                       (Author)

## 2018-01-15 NOTE — RESULT NOTES
Verified Results  (1) LITHIUM [id number] 07:30AM [facility]     Test Name Result Flag Reference   LITHIUM 0 6 mmol/L  0 6-1 2

## 2018-01-16 NOTE — PSYCH
Psych Med Mgmt    Appearance: was calm and cooperative, adequate hygiene and grooming and poor eye contact  Observed mood: was dysphoric, depressed and anxious  Observed mood: affect was constricted  Speech: a normal rate  Thought processes: coherent/organized  Hallucinations: no hallucinations present  Thought Content: no delusions  Abnormal Thoughts: The patient has no suicidal thoughts and no homicidal thoughts   feels hopeless at times  Orientation: The patient is oriented to person, place and time  Recent and Remote Memory: short term memory intact and long term memory intact  Attention Span And Concentration: concentration impaired  Insight: Insight intact  Judgment: His judgment was intact  Muscle Strength And Tone  Muscle strength and tone were normal  Normal gait and station  The patient is experiencing no localized pain  Treatment Recommendations: Stop Remeron  Increase quetiapine to 300 mg at night  Start duloxetine 30 mg daily for 7 days to be increased to 60 mg daily  Continue with clonazepam 2 mg twice daily  If this does not terminate the episode of depression, ECT will be considered  Risks, Benefits And Possible Side Effects Of Medications: Risks, benefits, and possible side effects of medications explained to patient and patient verbalizes understanding  He reports normal appetite, normal energy level, no weight change and normal number of sleep hours  The patient was seen for continuing care and pharmacotherapy  He feels he is going backwards instead of 4 word  He has had more bad days than good days  He was not able to tolerate bupropion because of intense anxiety after he started taking it and therefore he hasn't stopped it   He continued with Remeron and quetiapine and clonazepam and does not feel any better although his sleep and appetite are normal  He does not have any suicidal thoughts but he has this pervasive feeling of helplessness and hopelessness  Assessment    1  OSITO (generalized anxiety disorder) (300 02) (F41 1)   2  Recurrent major depressive disorder (296 30) (F33 9)    Plan    1  ClonazePAM 2 MG Oral Tablet; One tab twice daily    2  DULoxetine HCl - 30 MG Oral Capsule Delayed Release Particles; Take one   capsule a day for 7 days   3  DULoxetine HCl - 60 MG Oral Capsule Delayed Release Particles; TAKE ONE   CAPSULE BY MOUTH ONE TIME DAILY   4  QUEtiapine Fumarate 300 MG Oral Tablet; 1  at bedtime    Review of Systems    Constitutional: no fever, not feeling poorly, no recent weight gain, no chills, not feeling tired and no recent weight loss  Cardiovascular: no chest pain, the heart rate was not fast and no palpitations  Respiratory: no complaints of shortness of breath, no wheezing, no dyspnea on exertion  Gastrointestinal: no complaints of abdominal pain, no constipation, no nausea, no diarrhea, no vomiting  Genitourinary: no complaints of dysuria, no incontinence, no pelvic pain, no urinary frequency  Neurological: no headache, no numbness, no confusion and no dizziness  Substance Abuse Hx    Substance Abuse History: No caffeine  occasional alcohol,last in [place]  No drugs  Active Problems    1  Acute Tear Of Left Rotator Cuff Tendon (726 19)   2  Adrenal adenoma (227 0) (D35 00)   3  Allergic rhinitis (477 9) (J30 9)   4  Backache (724 5) (M54 9)   5  Benign essential hypertension (401 1) (I10)   6  Bicipital tendinitis of left shoulder (726 12) (M75 22)   7  Diarrhea (787 91) (R19 7)   8  Encounter for pre-employment health screening examination (V70 5) (Z02 1)   9  Fatigue (780 79) (R53 83)   10  OSITO (generalized anxiety disorder) (300 02) (F41 1)   11  Hypercholesterolemia (272 0) (E78 0)   12  Nonallopathic lesion of lumbar region (739 3) (M99 9)   13  Palpitations (785 1) (R00 2)   14  Prostate hyperplasia without urinary obstruction (600 90) (N40 0)   15   Rotator cuff tendinitis, left (726 10) (M75 82)   16  Shoulder joint pain, unspecified laterality   17  Shoulder Strain (840 9)   18  Visit for pre-operative examination (V72 84) (O11 378)    Past Medical History    1  History of Anxiety (300 00) (F41 9)   2  History of depression (V11 8) (Z86 59)   3  History of Inguinal hernia (550 90) (K40 90)   4  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)    Allergies    1  AMOXICILLIN   2  Advil TABS    Current Meds   1  Amlodipine Besy-Benazepril HCl - 10-40 MG Oral Capsule; 1 every day; Therapy: 70AAC8944 to (Last Rx:08Qsa1958)  Requested for: 90Omf7430 Ordered   2  ClonazePAM 2 MG Oral Tablet; One tab twice daily; Therapy: 26XSK6462 to (Last Rx:14Jcc0352)  Requested for: 62Rqw2225 Ordered   3  QUEtiapine Fumarate 200 MG Oral Tablet; TAKE 1 TABLET AT BEDTIME  Requested for:   28Hlo1916; Last Rx:17Jow2779 Ordered    Family Psych History    1  Family history of Depression    2  Family history of Hypercholesterolemia   3  Family history of Stroke Syndrome (V17 1)    4  Family history of Cervical Cancer    Social History    · Being A Social Drinker   · Current Every Day Smoker (305 1)   · Current Some Day Smoker (305 1)   · Daily Coffee Consumption (___ Cups/Day)    End of Encounter Meds    1  Amlodipine Besy-Benazepril HCl - 10-40 MG Oral Capsule; 1 every day; Therapy: 70HTQ6240 to (Last Rx:67Rnb3476)  Requested for: 14Eep3703 Ordered    2  ClonazePAM 2 MG Oral Tablet; One tab twice daily; Therapy: 82FYU5157 to (Last Rx:23Mar2016)  Requested for: 85HMW4930 Ordered    3  DULoxetine HCl - 30 MG Oral Capsule Delayed Release Particles; Take one capsule a   day for 7 days; Therapy: 28LNR4850 to (Last Rx:23Mar2016)  Requested for: 87QWT7589 Ordered   4  DULoxetine HCl - 60 MG Oral Capsule Delayed Release Particles; TAKE ONE CAPSULE   BY MOUTH ONE TIME DAILY; Therapy: 16DUQ7540 to (Evaluate:21Jun2016)  Requested for: 39YXC9289; Last   Rx:23Mar2016 Ordered   5   QUEtiapine Fumarate 300 MG Oral Tablet; 1 at bedtime  Requested for: 23Mar2016;   Last Rx:23Mar2016 Ordered    Signatures   Electronically signed by : Antonia Peterson MD; Mar 23 2016  5:44PM EST                       (Author)

## 2018-01-17 NOTE — RESULT NOTES
Discussion/Summary   Left message,    Lyme test negative, Complete treatment  perhaps repeat in 4-6 weeks  PSA a little high at 3  last was 2 6  Recheck and Urology evaluation if increases  Low fat Diet for LDL    /triglycerides  Recheck in 6 months  Other labs stable  Verified Results  (1) COMPREHENSIVE METABOLIC PANEL 15HQD5117 64:13MF Delmonico, Cathye Fleischer     Test Name Result Flag Reference   GLUCOSE 95 mg/dL  65-99   Fasting reference interval   UREA NITROGEN (BUN) 10 mg/dL  7-25   CREATININE 0 92 mg/dL  0 70-1 33   For patients >52years of age, the reference limit  for Creatinine is approximately 13% higher for people  identified as -American  eGFR NON-AFR   AMERICAN 91 mL/min/1 73m2  > OR = 60   eGFR AFRICAN AMERICAN 106 mL/min/1 73m2  > OR = 60   BUN/CREATININE RATIO   8-28   NOT APPLICABLE (calc)   SODIUM 140 mmol/L  135-146   POTASSIUM 4 2 mmol/L  3 5-5 3   CHLORIDE 106 mmol/L     CARBON DIOXIDE 25 mmol/L  20-31   CALCIUM 9 5 mg/dL  8 6-10 3   PROTEIN, TOTAL 7 0 g/dL  6 1-8 1   ALBUMIN 4 3 g/dL  3 6-5 1   GLOBULIN 2 7 g/dL (calc)  1 9-3 7   ALBUMIN/GLOBULIN RATIO 1 6 (calc)  1 0-2 5   BILIRUBIN, TOTAL 0 7 mg/dL  0 2-1 2   ALKALINE PHOSPHATASE 52 U/L     AST 14 U/L  10-35   ALT 15 U/L  9-46     (1) CBC/PLT/DIFF [id number] 09:06AM Delmonico, Cathye Fleischer     Test Name Result Flag Reference   WHITE BLOOD CELL COUNT 7 2 Thousand/uL  3 8-10 8   RED BLOOD CELL COUNT 4 78 Million/uL  4 20-5 80   HEMOGLOBIN 13 9 g/dL  13 2-17 1   HEMATOCRIT 42 6 %  38 5-50 0   MCV 89 1 fL  80 0-100 0   MCH 29 1 pg  27 0-33 0   MCHC 32 6 g/dL  32 0-36 0   RDW 14 1 %  11 0-15 0   PLATELET COUNT 797 Thousand/uL  140-400   ABSOLUTE NEUTROPHILS 3434 cells/uL  9341-1361   ABSOLUTE LYMPHOCYTES 2743 cells/uL  850-3900   ABSOLUTE MONOCYTES 634 cells/uL  200-950   ABSOLUTE EOSINOPHILS 295 cells/uL     ABSOLUTE BASOPHILS 94 cells/uL  0-200   NEUTROPHILS 47 7 %     LYMPHOCYTES 38 1 %     MONOCYTES 8 8 % EOSINOPHILS 4 1 %     BASOPHILS 1 3 %     MPV 10 1 fL  7 5-12 5     (1) LIPID PANEL, FASTING [id number] 09:06AM Alberto Silvestreianne Copa     Test Name Result Flag Reference   CHOLESTEROL, TOTAL 249 mg/dL H 125-200   HDL CHOLESTEROL 32 mg/dL L > OR = 40   TRIGLICERIDES 975 mg/dL H <150   LDL-CHOLESTEROL 147 mg/dL (calc) H <130   Desirable range <100 mg/dL for patients with CHD or  diabetes and <70 mg/dL for diabetic patients with  known heart disease  CHOL/HDLC RATIO 7 8 (calc) H < OR = 5 0   NON HDL CHOLESTEROL 217 mg/dL (calc) H    Target for non-HDL cholesterol is 30 mg/dL higher than   LDL cholesterol target  (Q) LYME DISEASE AB, TOTAL W/REFL WB (IGG, IGM) [id number] 09:06AM Nikki Silvestre Copa     Test Name Result Flag Reference   LYME AB SCREEN <0 90 index     Index                Interpretation                     -----                --------------                     < 0 90               Negative                     0  90-1 09            Equivocal                     > 1 09               Positive      As recommended by the Food and Drug Administration   (FDA), all samples with positive or equivocal   results in a Borrelia burgdorferi antibody screen  will be tested using a blot method  Positive or   equivocal screening test results should not be   interpreted as truly positive until verified as such   using a supplemental assay (e g , B  burgdorferi blot)  The screening test and/or blot for B  burgdorferi   antibodies may be falsely negative in early stages  of Lyme disease, including the period when erythema   migrans is apparent  (1) PSA (SCREEN) (Dx V76 44 Screen for Prostate Cancer) [id number] 09:06AM Nikki Silvestre   REPORT COMMENT:  FASTING:YES     Test Name Result Flag Reference   PSA, TOTAL 3 0 ng/mL  < OR = 4 0   The total PSA value from this assay system is   standardized against the WHO standard   The test   result will be approximately 20% lower when compared   to the equimolar-standardized total PSA (Margaret   Loreta)  Comparison of serial PSA results should be   interpreted with this fact in mind  This test was performed using the Siemens   chemiluminescent method  Values obtained from   different assay methods cannot be used  interchangeably  PSA levels, regardless of  value, should not be interpreted as absolute  evidence of the presence or absence of disease

## 2018-01-17 NOTE — PSYCH
Psych Med Mgmt    Appearance: was calm and cooperative, adequate hygiene and grooming and good eye contact  Observed mood: anxious  Observed mood: affect was constricted  Speech: a normal rate  Thought processes: coherent/organized  Hallucinations: no hallucinations present  Thought Content: no delusions  Abnormal Thoughts: The patient has no suicidal thoughts and no homicidal thoughts  Orientation: The patient is oriented to person, place and time  Recent and Remote Memory: short term memory intact and long term memory intact  Attention Span And Concentration: concentration intact  Insight: Insight intact  Judgment: His judgment was intact  Muscle Strength And Tone  Muscle strength and tone were normal  Normal gait and station  The patient is experiencing no localized pain  the patient [facility] 211/18/2015  He presented with depression and anxiety  he was unable to function at home  His outpatient psychiatrist had prescribed Trileptal and mirtazapine  I was not able to elicit any features of a bipolar disorder  Was treated with a combination of mirtazapine, Seroquel and clonazepam  He has been feeling less anxious but instead, complains of tiredness and fatigue and sleepiness which I believe is related to medications  His family history is significant for his father committing suicide although he was estranged from his father  Some conflicts with his wife related to finances but all in all his lifestyle has not changed much since discharge from the hospital and the relationship is stable  Assessment    1  OSITO (generalized anxiety disorder) (300 02) (F41 1)   2  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)    Plan    1  Mirtazapine 15 MG Oral Tablet; TAKE 1 TABLET Bedtime   2  QUEtiapine Fumarate 200 MG Oral Tablet; TAKE 1 TABLET AT BEDTIME    3   From  ClonazePAM 1 MG Oral Tablet 2 tab BID To ClonazePAM 2 MG Oral   Tablet One tab twice daily    Review of Systems    Constitutional: feeling tired  Cardiovascular: no complaints of slow or fast heart rate, no chest pain, no palpitations  and no palpitations  Respiratory: no complaints of shortness of breath, no wheezing, no dyspnea on exertion  and no shortness of breath  Gastrointestinal: diarrhea  Musculoskeletal: no complaints of arthralgia, no myalgias, no limb pain, no joint stiffness  Neurological: no complaints of headache, no confusion, no numbness, no dizziness  Active Problems    1  Acute Tear Of Left Rotator Cuff Tendon (726 19)   2  Adrenal adenoma (227 0) (D35 00)   3  Allergic rhinitis (477 9) (J30 9)   4  Backache (724 5) (M54 9)   5  Benign essential hypertension (401 1) (I10)   6  Bicipital tendinitis of left shoulder (726 12) (M75 22)   7  Diarrhea (787 91) (R19 7)   8  Encounter for pre-employment health screening examination (V70 5) (Z02 1)   9  Fatigue (780 79) (R53 83)   10  Hypercholesterolemia (272 0) (E78 0)   11  Inguinal hernia (550 90) (K40 90)   12  Nonallopathic lesion of lumbar region (739 3) (M99 03)   13  Palpitations (785 1) (R00 2)   14  Prostate hyperplasia without urinary obstruction (600 90) (N40 0)   15  Rotator cuff tendinitis, left (726 10) (M75 82)   16  Shoulder joint pain, unspecified laterality (719 41) (M25 519)   17  Shoulder Strain (840 9)   18  Visit for pre-operative examination (V72 84) (N57 190)    Past Medical History    1  History of Anxiety (300 00) (F41 9)   2  History of depression (V11 8) (Z86 59)    Allergies    1  AMOXICILLIN   2  Advil TABS    Current Meds   1  Amlodipine Besy-Benazepril HCl - 10-40 MG Oral Capsule; 1 every day; Therapy: 72QEZ9436 to (Last Rx:28Dec2015)  Requested for: 28Dec2015 Ordered   2  ClonazePAM 1 MG Oral Tablet; 2 tab BID; Therapy: 93PRS5511-(VYZAGJUT:13JKQ2286)  Requested for: 28Dec2015 Ordered   3  Mirtazapine 30 MG Oral Tablet; TAKE 1 TABLET AT BEDTIME Recorded   4   QUEtiapine Fumarate 300 MG Oral Tablet; TAKE 1 TABLET AT BEDTIME Recorded    Family Psych History    1  Family history of Depression    2  Family history of Hypercholesterolemia   3  Family history of Stroke Syndrome (V17 1)    4  Family history of Cervical Cancer    Social History    · Being A Social Drinker   · Current Every Day Smoker (305 1)   · Current Some Day Smoker (305 1)   · Daily Coffee Consumption (___ Cups/Day)    End of Encounter Meds    1  Amlodipine Besy-Benazepril HCl - 10-40 MG Oral Capsule; 1 every day; Therapy: 90BCI7430 to (Last Rx:63Iyo5779)  Requested for: 28Dec2015 Ordered    2  Mirtazapine 15 MG Oral Tablet; TAKE 1 TABLET Bedtime  Requested for: 08TFI8578;   Last Rx:20Jan2016 Ordered   3  QUEtiapine Fumarate 200 MG Oral Tablet; TAKE 1 TABLET AT BEDTIME  Requested for:   05NZC1652; Last Rx:20Jan2016 Ordered    4  ClonazePAM 2 MG Oral Tablet; One tab twice daily;    Therapy: 83CMD6302 to (Evaluate:42Vni0271)  Requested for: 20Jan2016; Last   Rx:20Jan2016 Ordered    Future Appointments    Date/Time Provider Specialty Site   01/21/2016 01:45 PM Rosa Maria Ansari MD General Surgery [address]     Signatures   Electronically signed by : Susan Max MD; Jan 20 2016  4:17PM EST                       (Author)    Electronically signed by : Susan Max MD; Jan 20 2016  4:36PM EST                       (Author)    Electronically signed by : Susan Max MD; Jan 26 2016  4:27PM EST                       (Author)    Electronically signed by : Susan Max MD; Feb 24 2016  3:20PM EST                       (Author)

## 2018-01-31 ENCOUNTER — OFFICE VISIT (OUTPATIENT)
Dept: FAMILY MEDICINE CLINIC | Facility: CLINIC | Age: 58
End: 2018-01-31
Payer: COMMERCIAL

## 2018-01-31 VITALS
DIASTOLIC BLOOD PRESSURE: 90 MMHG | HEART RATE: 78 BPM | RESPIRATION RATE: 18 BRPM | WEIGHT: 243 LBS | SYSTOLIC BLOOD PRESSURE: 140 MMHG | TEMPERATURE: 100.2 F | HEIGHT: 72 IN | BODY MASS INDEX: 32.91 KG/M2

## 2018-01-31 DIAGNOSIS — J20.9 ACUTE BRONCHITIS, UNSPECIFIED ORGANISM: Primary | ICD-10-CM

## 2018-01-31 PROBLEM — A69.20 ERYTHEMA MIGRANS (LYME DISEASE): Status: ACTIVE | Noted: 2017-07-24

## 2018-01-31 PROCEDURE — 99213 OFFICE O/P EST LOW 20 MIN: CPT | Performed by: NURSE PRACTITIONER

## 2018-01-31 RX ORDER — DULOXETIN HYDROCHLORIDE 60 MG/1
1 CAPSULE, DELAYED RELEASE ORAL DAILY
COMMUNITY
Start: 2016-03-23 | End: 2018-06-18 | Stop reason: ALTCHOICE

## 2018-01-31 RX ORDER — DOXYCYCLINE 100 MG/1
100 CAPSULE ORAL 2 TIMES DAILY
Qty: 20 CAPSULE | Refills: 0 | Status: SHIPPED | OUTPATIENT
Start: 2018-01-31 | End: 2018-02-10

## 2018-01-31 RX ORDER — TRAZODONE HYDROCHLORIDE 150 MG/1
300 TABLET ORAL
COMMUNITY
End: 2018-06-14 | Stop reason: SDUPTHER

## 2018-01-31 RX ORDER — LITHIUM CARBONATE 450 MG
1 TABLET, EXTENDED RELEASE ORAL 2 TIMES DAILY
COMMUNITY
Start: 2016-12-28 | End: 2018-06-18 | Stop reason: ALTCHOICE

## 2018-01-31 RX ORDER — CLONAZEPAM 2 MG/1
1 TABLET ORAL 2 TIMES DAILY
COMMUNITY
Start: 2013-11-26 | End: 2020-07-13 | Stop reason: SDUPTHER

## 2018-01-31 RX ORDER — LAMOTRIGINE 25 MG/1
200 TABLET ORAL DAILY
COMMUNITY
End: 2018-09-28 | Stop reason: SDUPTHER

## 2018-01-31 RX ORDER — METHYLPREDNISOLONE 4 MG/1
TABLET ORAL
Qty: 21 TABLET | Refills: 0 | Status: SHIPPED | OUTPATIENT
Start: 2018-01-31 | End: 2018-06-18 | Stop reason: ALTCHOICE

## 2018-01-31 RX ORDER — QUETIAPINE FUMARATE 200 MG/1
200 TABLET, FILM COATED ORAL DAILY
COMMUNITY
Start: 2017-02-22 | End: 2020-10-28 | Stop reason: DRUGHIGH

## 2018-01-31 NOTE — PATIENT INSTRUCTIONS
Take medication with food  Can take some probiotic and yogurt with the medication  Increase fluid intake, saline nasal rinses, and hot tea with honey and lemon  Cool air humidification can be helpful as well  May take Ibuprofen or Tylenol as needed for pain or fevers  Mucinex D for sinus congestion or Coricidin HBP if you have high blood pressure or a heart condition  Mucinex or Robitussin DM are effective for cough and chest congestion  Supportive care discussed and advised  Follow up for no improvement and worsening of conditions  Patient advised and educated when to see immediate medical care  The patient was counseled regarding instructions for management, risk factor reductions, prognosis, risks and benefits of treatment options, patient and family education, and importance of compliance with treatment  Acute Bronchitis   WHAT YOU NEED TO KNOW:   What is acute bronchitis? Acute bronchitis is swelling and irritation in the air passages of your lungs  This irritation may cause you to cough or have other breathing problems  Acute bronchitis often starts because of another illness, such as a cold or the flu  The illness spreads from your nose and throat to your windpipe and airways  Bronchitis is often called a chest cold  Acute bronchitis lasts about 3 to 6 weeks and is usually not a serious illness  What causes acute bronchitis? · Infection  caused by a virus, bacteria, or a fungus    · Polluted air  caused by chemical fumes, dust, smoke, allergens, or pollution  What increases my risk for acute bronchitis? · Age, usually older adults    · Smoking cigarettes or being around cigarette smoke    · Chronic lung diseases or chronic sinus infections    · Weakened immune system    · Gastroesophageal reflux disease    · Allergies and environmental changes  What are the signs and symptoms of acute bronchitis?    · A cough with sputum that may be clear, yellow, or green    · Feeling more tired than usual, and body aches    · A fever and chills    · Wheezing when you breathe    · A tight chest or pain when you breathe or cough  How is acute bronchitis diagnosed? Your healthcare provider may diagnose bronchitis by your symptoms  If he is not sure, you may need the following:  · Blood tests  will be done to see if your symptoms are caused by an infection  · X-ray  pictures of your lungs and heart may show signs of infection, such as pneumonia  Chest x-rays may also show fluid around your heart and lungs  How is acute bronchitis treated? Your healthcare provider will treat any condition that has caused your acute bronchitis  He may also give you any of the following:  · Ibuprofen or acetaminophen  are medicines that help lower your fever  They are available without a doctor's order  Ask your healthcare provider which medicine is right for you  Ask how much to take and how often to take it  Follow directions  These medicines can cause stomach bleeding if not taken correctly  Ibuprofen can cause kidney damage  Do not take ibuprofen if you have kidney disease, an ulcer, or allergies to aspirin  Acetaminophen can cause liver damage  Do not take more than 4,000 milligrams in 24 hours  · Decongestants  help loosen mucus in your lungs and make it easier to cough up  This can help you breathe easier  · Cough suppressants  decrease your urge to cough  If your cough produces mucus, do not take a cough suppressant unless your healthcare provider tells you to  Your healthcare provider may suggest that you take a cough suppressant at night so you can rest     · Inhalers  may be given  Your healthcare provider may give you one or more inhalers to help you breathe easier and cough less  An inhaler gives your medicine to open your airways  Ask your healthcare provider to show you how to use your inhaler correctly  How can I care for myself when I have acute bronchitis?    · Get more rest   Rest helps your body to heal  Slowly start to do more each day  Rest when you feel it is needed  · Avoid irritants in the air  Avoid chemicals, fumes, and dust  Wear a face mask if you must work around dust or fumes  Stay inside on days when air pollution levels are high  If you have allergies, stay inside when pollen counts are high  Do not use aerosol products, such as spray-on deodorant, bug spray, and hair spray  · Do not smoke or be around others who smoke  Nicotine and other chemicals in cigarettes and cigars damages the cilia that move mucus out of your lungs  Ask your healthcare provider for information if you currently smoke and need help to quit  E-cigarettes or smokeless tobacco still contain nicotine  Talk to your healthcare provider before you use these products  · Drink liquids as directed  Liquids help keep your air passages moist and help you cough up mucus  You may need to drink more liquids when you have acute bronchitis  Ask how much liquid to drink each day and which liquids are best for you  · Use a humidifier or vaporizer  Use a cool mist humidifier or a vaporizer to increase air moisture in your home  This may make it easier for you to breathe and help decrease your cough  How can I decrease my risk for acute bronchitis? · Get the vaccinations you need  Ask your healthcare provider if you should get vaccinated against the flu or pneumonia  · Prevent the spread of germs  You can decrease your risk of acute bronchitis and other illnesses by doing the following:     [facility] your hands often with soap and water  Carry germ-killing hand lotion or gel with you  You can use the lotion or gel to clean your hands when soap and water are not available  ¨ Do not touch your eyes, nose, or mouth unless you have washed your hands first     ¨ Always cover your mouth when you cough to prevent the spread of germs  It is best to cough into a tissue or your shirt sleeve instead of into your hand   Ask those around you cover their mouths when they cough  ¨ Try to avoid people who have a cold or the flu  If you are sick, stay away from others as much as possible  When should I seek immediate care? · You cough up blood  · Your lips or fingernails turn blue  · You feel like you are not getting enough air when you breathe  When should I contact my healthcare provider? · You have a fever  · Your breathing problems do not go away or get worse  · Your cough does not get better within 4 weeks  · You have questions or concerns about your condition or care  CARE AGREEMENT:   You have the right to help plan your care  Learn about your health condition and how it may be treated  Discuss treatment options with your caregivers to decide what care you want to receive  You always have the right to refuse treatment  The above information is an  only  It is not intended as medical advice for individual conditions or treatments  Talk to your doctor, nurse or pharmacist before following any medical regimen to see if it is safe and effective for you  © 2017 [address] [facility] Information is for End User's use only and may not be sold, redistributed or otherwise used for commercial purposes  All illustrations and images included in CareNotes® are the copyrighted property of [employer]  or [employer]  Cigarette Smoking and Your Health   AMBULATORY CARE:   Risks to your health if you smoke:  Nicotine and other chemicals found in tobacco damage every cell in your body  Even if you are a light smoker, you have an increased risk for cancer, heart disease, and lung disease  If you are pregnant or have diabetes, smoking increases your risk for complications  Benefits to your health if you stop smoking:   · You decrease respiratory symptoms such as coughing, wheezing, and shortness of breath       · You reduce your risk for cancers of the lung, mouth, throat, kidney, bladder, pancreas, stomach, and cervix  If you already have cancer, you increase the benefits of chemotherapy  You also reduce your risk for cancer returning or a second cancer from developing  · You reduce your risk for heart disease, blood clots, heart attack, and stroke  · You reduce your risk for lung infections, and diseases such as pneumonia, asthma, chronic bronchitis, and emphysema  · Your circulation improves  More oxygen can be delivered to your body  If you have diabetes, you lower your risk for complications, such as kidney, artery, and eye diseases  You also lower your risk for nerve damage  Nerve damage can lead to amputations, poor vision, and blindness  · You improve your body's ability to heal and to fight infections  Benefits to the health of others if you stop smoking:  Tobacco is harmful to nonsmokers who breathe in your secondhand smoke  The following are ways the health of others around you may improve when you stop smoking:  · You lower the risks for lung cancer and heart disease in nonsmoking adults  · If you are pregnant, you lower the risk for miscarriage, early delivery, low birth weight, and stillbirth  You also lower your baby's risk for SIDS, obesity, developmental delay, and neurobehavioral problems, such as ADHD  · If you have children, you lower their risk for ear infections, colds, pneumonia, bronchitis, and asthma  For more information and support to stop smoking:   · Smokefree  gov  Phone: 2- 296 - 488-3083  Web Address: www smokefrRobotsAlive  Follow up with your healthcare provider as directed:  Write down your questions so you remember to ask them during your visits  © 2017 [address] Information is for End User's use only and may not be sold, redistributed or otherwise used for commercial purposes  All illustrations and images included in CareNotes® are the copyrighted property of [employer]  or Isiah Henry    The above information is an  only  It is not intended as medical advice for individual conditions or treatments  Talk to your doctor, nurse or pharmacist before following any medical regimen to see if it is safe and effective for you

## 2018-01-31 NOTE — PROGRESS NOTES
Assessment/Plan:  Take medication with food  Can take some probiotic and yogurt with the medication  Increase fluid intake, saline nasal rinses, and hot tea with honey and lemon  Cool air humidification can be helpful as well  May take Ibuprofen or Tylenol as needed for pain or fevers  Mucinex D for sinus congestion or Coricidin HBP if you have high blood pressure or a heart condition  Mucinex or Robitussin DM are effective for cough and chest congestion  Supportive care discussed and advised  Follow up for no improvement and worsening of conditions  Patient advised and educated when to see immediate medical care  The patient was counseled regarding instructions for management, risk factor reductions, prognosis, risks and benefits of treatment options, patient and family education, and importance of compliance with treatment  Diagnoses and all orders for this visit:    Acute bronchitis, unspecified organism  -     doxycycline monohydrate (MONODOX) 100 mg capsule; Take 1 capsule (100 mg total) by mouth 2 (two) times a day for 10 days  -     Methylprednisolone 4 MG TBPK; Use as directed on package        No Follow-up on file  Subjective:      Patient ID: Heather Estrada is a 59 y o  male  Chief Complaint   Patient presents with    Cold Like Symptoms     chest congestion,    Shortness of Breath    Fatigue       HPI   Patient stated that having head congestion, wheezing, achy, sneezing from couple of days  Denies fever, sore throat, ear ache, and chest congestion but SOB with exertion at times  Not taking any OTC  The following portions of the patient's history were reviewed and updated as appropriate: allergies, current medications, past family history, past medical history, past social history, past surgical history and problem list       Review of Systems   Constitutional: Negative for chills, fatigue and fever  HENT: Positive for sneezing   Negative for congestion, ear pain, postnasal drip, sinus pain, sinus pressure, sore throat and voice change  Respiratory: Positive for cough and shortness of breath (with exertion)  Negative for chest tightness and wheezing  Cardiovascular: Negative  Neurological: Negative for dizziness and headaches  Head congestion         Objective:    History   Smoking Status    Current Some Day Smoker   Smokeless Tobacco    Current User     Comment: quit about 1-2 years ago  Allergies: Allergies   Allergen Reactions    Amoxicillin Visual Disturbance     Reaction Date: [id number];     Motrin [Ibuprofen]      Reaction Date: [id number]; Lips swelled    Other      Pt "I think the antibiotic is Avelox"       Vitals:  /90   Pulse 78   Temp 100 2 °F (37 9 °C)   Resp 18   Ht 6' (1 829 m)   Wt 110 kg (243 lb)   BMI 32 96 kg/m²     Current Outpatient Prescriptions   Medication Sig Dispense Refill    clonazePAM (KlonoPIN) 2 mg tablet Take 1 tablet by mouth 2 (two) times a day      lamoTRIgine (LaMICtal) 25 mg tablet Take 50 mg by mouth daily      QUEtiapine (SEROquel) 200 mg tablet Take 1 5 tablets by mouth daily      traZODone (DESYREL) 150 mg tablet Take 150 mg by mouth daily at bedtime      doxycycline monohydrate (MONODOX) 100 mg capsule Take 1 capsule (100 mg total) by mouth 2 (two) times a day for 10 days 20 capsule 0    DULoxetine (CYMBALTA) 60 mg delayed release capsule Take 1 capsule by mouth daily      lithium carbonate (LITHOBID) 450 mg CR tablet Take 1 tablet by mouth 2 (two) times a day      Methylprednisolone 4 MG TBPK Use as directed on package 21 tablet 0     No current facility-administered medications for this visit  Physical Exam   Constitutional: He is oriented to person, place, and time  He appears well-developed and well-nourished     HENT:   Right Ear: Tympanic membrane and ear canal normal    Left Ear: Tympanic membrane and ear canal normal    Nose: Nose normal    Mouth/Throat: Oropharynx is clear and moist and mucous membranes are normal    Cardiovascular: Normal rate and regular rhythm  Pulmonary/Chest: Effort normal  He has wheezes in the right upper field, the right middle field, the left upper field and the left middle field  Musculoskeletal: Normal range of motion  Lymphadenopathy:        Right cervical: No superficial cervical and no posterior cervical adenopathy present  Left cervical: No superficial cervical and no posterior cervical adenopathy present  Neurological: He is alert and oriented to person, place, and time  Skin: Skin is warm and dry  Psychiatric: He has a normal mood and affect           NICKOLAS Otero

## 2018-04-26 ENCOUNTER — OFFICE VISIT (OUTPATIENT)
Dept: FAMILY MEDICINE CLINIC | Facility: CLINIC | Age: 58
End: 2018-04-26
Payer: COMMERCIAL

## 2018-04-26 VITALS
TEMPERATURE: 98.2 F | DIASTOLIC BLOOD PRESSURE: 82 MMHG | BODY MASS INDEX: 31.42 KG/M2 | HEIGHT: 72 IN | HEART RATE: 88 BPM | WEIGHT: 232 LBS | SYSTOLIC BLOOD PRESSURE: 132 MMHG | RESPIRATION RATE: 20 BRPM

## 2018-04-26 DIAGNOSIS — L25.5 DERMATITIS DUE TO PLANTS: Primary | ICD-10-CM

## 2018-04-26 PROCEDURE — 99213 OFFICE O/P EST LOW 20 MIN: CPT | Performed by: FAMILY MEDICINE

## 2018-04-26 RX ORDER — MOMETASONE FUROATE 1 MG/G
CREAM TOPICAL DAILY
Qty: 45 G | Refills: 0 | Status: SHIPPED | OUTPATIENT
Start: 2018-04-26 | End: 2018-06-18 | Stop reason: ALTCHOICE

## 2018-04-26 RX ORDER — PREDNISONE 20 MG/1
TABLET ORAL
Qty: 32 TABLET | Refills: 0 | Status: SHIPPED | OUTPATIENT
Start: 2018-04-26 | End: 2018-06-18 | Stop reason: ALTCHOICE

## 2018-04-26 NOTE — PROGRESS NOTES
Assessment/Plan:    Problem List Items Addressed This Visit     None      Visit Diagnoses     Dermatitis due to plants    -  Primary    Relevant Medications    mometasone (ELOCON) 0 1 % cream    predniSONE 20 mg tablet          There are no Patient Instructions on file for this visit  No Follow-up on file  Subjective:      Patient ID: Tammy Ackerman is a 59 y o  male  Chief Complaint   Patient presents with    Rash     States he has "poison ivy" on his lower back  Eulis Leather LPN       Pt states he has poison ivy     Rash on buttock B/L gluts  Very iychy  Not painful      Rash   Pertinent negatives include no congestion  The following portions of the patient's history were reviewed and updated as appropriate: allergies, current medications, past family history, past medical history, past social history, past surgical history and problem list     Review of Systems   Constitutional: Negative for activity change and appetite change  HENT: Negative for congestion and sinus pressure  Eyes: Negative for discharge and itching  Skin: Positive for rash  Allergic/Immunologic: Negative for environmental allergies  Hematological: Negative for adenopathy           Current Outpatient Prescriptions   Medication Sig Dispense Refill    clonazePAM (KlonoPIN) 2 mg tablet Take 1 tablet by mouth 2 (two) times a day      lamoTRIgine (LaMICtal) 25 mg tablet Take 150 mg by mouth daily        QUEtiapine (SEROquel) 200 mg tablet Take 1 5 tablets by mouth daily      traZODone (DESYREL) 150 mg tablet Take 300 mg by mouth daily at bedtime        DULoxetine (CYMBALTA) 60 mg delayed release capsule Take 1 capsule by mouth daily      lithium carbonate (LITHOBID) 450 mg CR tablet Take 1 tablet by mouth 2 (two) times a day      Methylprednisolone 4 MG TBPK Use as directed on package 21 tablet 0    mometasone (ELOCON) 0 1 % cream Apply topically daily 45 g 0    predniSONE 20 mg tablet 4 tabs for three days, 3 tabs for three days, 2 tabs for three days, 1 tab for three days, 1/2 tab for 4 days 32 tablet 0     No current facility-administered medications for this visit  Objective:    /82   Pulse 88   Temp 98 2 °F (36 8 °C)   Resp 20   Ht 6' (1 829 m)   Wt 105 kg (232 lb)   BMI 31 46 kg/m²        Physical Exam   Constitutional: He appears well-developed and well-nourished  HENT:   Head: Normocephalic and atraumatic  Eyes: Conjunctivae and EOM are normal  Pupils are equal, round, and reactive to light  Neck: Normal range of motion  Cardiovascular: Normal rate and regular rhythm  Pulmonary/Chest: Effort normal and breath sounds normal    Abdominal: Soft  Genitourinary: Penis normal    Skin: Rash noted  Nursing note and vitals reviewed               Javed Phillip DO

## 2018-06-14 RX ORDER — TRAZODONE HYDROCHLORIDE 300 MG/1
300 TABLET ORAL
Refills: 0 | COMMUNITY
Start: 2018-04-17 | End: 2020-07-07 | Stop reason: SDUPTHER

## 2018-06-18 ENCOUNTER — OFFICE VISIT (OUTPATIENT)
Dept: FAMILY MEDICINE CLINIC | Facility: CLINIC | Age: 58
End: 2018-06-18
Payer: COMMERCIAL

## 2018-06-18 VITALS
TEMPERATURE: 98.1 F | BODY MASS INDEX: 31.64 KG/M2 | DIASTOLIC BLOOD PRESSURE: 76 MMHG | HEART RATE: 72 BPM | HEIGHT: 71 IN | SYSTOLIC BLOOD PRESSURE: 126 MMHG | WEIGHT: 226 LBS | RESPIRATION RATE: 16 BRPM

## 2018-06-18 DIAGNOSIS — Z12.5 PROSTATE CANCER SCREENING: ICD-10-CM

## 2018-06-18 DIAGNOSIS — Z11.59 NEED FOR HEPATITIS C SCREENING TEST: ICD-10-CM

## 2018-06-18 DIAGNOSIS — Z12.11 COLON CANCER SCREENING: ICD-10-CM

## 2018-06-18 DIAGNOSIS — Z13.6 SCREENING FOR CARDIOVASCULAR CONDITION: ICD-10-CM

## 2018-06-18 DIAGNOSIS — Z00.00 ANNUAL PHYSICAL EXAM: Primary | ICD-10-CM

## 2018-06-18 DIAGNOSIS — R53.83 OTHER FATIGUE: ICD-10-CM

## 2018-06-18 DIAGNOSIS — Z79.899 ENCOUNTER FOR LONG-TERM CURRENT USE OF MEDICATION: ICD-10-CM

## 2018-06-18 DIAGNOSIS — Z23 NEED FOR VACCINATION: ICD-10-CM

## 2018-06-18 LAB — SL AMB POCT FECES OCC BLD: NEGATIVE

## 2018-06-18 PROCEDURE — 82270 OCCULT BLOOD FECES: CPT | Performed by: FAMILY MEDICINE

## 2018-06-18 PROCEDURE — 99396 PREV VISIT EST AGE 40-64: CPT | Performed by: FAMILY MEDICINE

## 2018-06-18 NOTE — PROGRESS NOTES
FAMILY PRACTICE HEALTH MAINTENANCE OFFICE VISIT  [facility]    NAME: Lilliana Rivero  AGE: 59 y o  SEX: male  : 1960     DATE: 2018    Assessment and Plan     Problem List Items Addressed This Visit     None      Visit Diagnoses     Annual physical exam    -  Primary    Prostate cancer screening        Relevant Orders    PSA, Total Screen    Screening for cardiovascular condition        Relevant Orders    Comprehensive metabolic panel    Lipid Panel with Direct LDL reflex    Need for hepatitis C screening test        Relevant Orders    Hepatitis C Ab W/Refl To HCV RNA, Qn, PCR    Encounter for long-term current use of medication        Relevant Orders    CBC    TSH, 3rd generation    Other fatigue        Relevant Orders    Testosterone    Need for vaccination        Relevant Medications    Zoster Vac Recomb Adjuvanted ([address]) 50 MCG SUSR    Colon cancer screening        Relevant Orders    POCT hemoccult screening (Completed)            · Patient Counseling:   · Nutrition: Stressed importance of a well balanced diet, moderation of sodium/saturated fat, caloric balance and sufficient intake of fiber  · Exercise: Stressed the importance of regular exercise with a goal of 150 minutes per week  · Dental Health: Discussed daily flossing and brushing and regular dental visits     · Immunizations reviewed  Shingrix recommended  · Discussed benefits of screening colonoscopy, due next year  · Discussed the patient's BMI with him  The BMI is above average; BMI management plan is completed  Weight loss advised  Return in about 1 year (around 2019) for Annual physical         Chief Complaint     Chief Complaint   Patient presents with    Physical Exam     lj       History of Present Illness     He has been seeing Dr Shay Menjivar for depression           Well Adult Physical   Patient here for a comprehensive physical exam       Diet and Physical Activity  Diet: well balanced diet  Weight concerns: Patient has class 1 obesity (BMI 30-34  9)  Exercise: frequently      Depression Screen  PHQ-9 Depression Screening    PHQ-9:    Frequency of the following problems over the past two weeks:       Little interest or pleasure in doing things:  0 - not at all  Feeling down, depressed, or hopeless:  0 - not at all  PHQ-2 Score:  0          General Health  Hearing: Normal:  bilateral  Vision: no vision problems  Dental: no dental visits for >1 year    Reproductive Health  Enlarged prostate      The following portions of the patient's history were reviewed and updated as appropriate: allergies, current medications, past family history, past medical history, past social history, past surgical history and problem list     Review of Systems     Review of Systems   Respiratory: Negative  Cardiovascular: Negative  Past Medical History     Past Medical History:   Diagnosis Date    Anxiety     BPH (benign prostatic hyperplasia)     Depressed     Hayfever     Suicide attempt [facility])        Past Surgical History     Past Surgical History:   Procedure Laterality Date    HERNIA REPAIR Bilateral     5 months ago   KNEE ARTHROSCOPY Left     Possible ACL repair    ROTATOR CUFF REPAIR Left     about 1 year ago       Social History     Social History     Social History    Marital status: /Civil Union     Spouse name: N/A    Number of children: N/A    Years of education: N/A     Occupational History    retired        Social History Main Topics    Smoking status: Current Every Day Smoker    Smokeless tobacco: Never Used      Comment: quit about 1-2 years ago        Alcohol use Yes      Comment: rarely    Drug use: No    Sexual activity: Not Currently     Other Topics Concern    None     Social History Narrative    None       Family History     Family History   Problem Relation Age of Onset    Suicide Attempts Father        Current Medications       Current Outpatient Prescriptions:     clonazePAM (KlonoPIN) 2 mg tablet, Take 1 tablet by mouth 2 (two) times a day, Disp: , Rfl:     lamoTRIgine (LaMICtal) 25 mg tablet, Take 200 mg by mouth daily  , Disp: , Rfl:     QUEtiapine (SEROquel) 200 mg tablet, Take 200 mg by mouth daily  , Disp: , Rfl:     traZODone (DESYREL) 300 MG tablet, Take 300 mg by mouth daily at bedtime, Disp: , Rfl: 0    Zoster Vac Recomb Adjuvanted (SHINGRIX) 50 MCG SUSR, Inject 0 5 mL into the shoulder, thigh, or buttocks daily Inject once and then again in 2-6 months, Disp: 1 each, Rfl: 1     Allergies     Allergies   Allergen Reactions    Amoxicillin Visual Disturbance     Reaction Date: [id number];     Motrin [Ibuprofen]      Reaction Date: [id number]; Lips swelled    Other      Pt "I think the antibiotic is Avelox"       Objective     /76   Pulse 72   Temp 98 1 °F (36 7 °C)   Resp 16   Ht 5' 10 5" (1 791 m)   Wt 103 kg (226 lb)   BMI 31 97 kg/m²      Physical Exam   Constitutional: He is oriented to person, place, and time  He appears well-developed and well-nourished  HENT:   Head: Normocephalic and atraumatic  Right Ear: External ear normal    Left Ear: External ear normal    Mouth/Throat: Oropharynx is clear and moist    Neck: Normal range of motion  Cardiovascular: Normal rate, regular rhythm and normal heart sounds  No murmur heard  Pulmonary/Chest: Effort normal and breath sounds normal  No respiratory distress  He has no wheezes  He has no rales  Abdominal: Soft  Bowel sounds are normal  He exhibits no distension  There is no tenderness  There is no rebound  Genitourinary: Rectal exam shows guaiac negative stool  Genitourinary Comments: Enlarged prostate,no nodules  Internal hemorrhoids   Musculoskeletal: Normal range of motion  He exhibits no edema  Neurological: He is alert and oriented to person, place, and time  He has normal reflexes  Skin: He is not diaphoretic     Nursing note and vitals reviewed           Visual Acuity Screening    Right eye Left eye Both eyes   Without correction: 20/25 20/25 20/25   With correction:          Health Maintenance     Health Maintenance   Topic Date Due    HIV SCREENING  1960    Hepatitis C Screening  1960    CRC Screening: Colonoscopy  1960    PNEUMOCOCCAL POLYSACCHARIDE VACCINE AGE 2-64 HIGH RISK  07/27/1960    INFLUENZA VACCINE  09/01/2018    DTaP,Tdap,and Td Vaccines (2 - Td) 09/26/2023     Immunization History   Administered Date(s) Administered    Influenza TIV (IM) 10/15/2008, 10/18/2011, 09/26/2013    Tdap 09/26/2013       Valeri Rasmussen DO  [address]

## 2018-08-27 ENCOUNTER — TELEPHONE (OUTPATIENT)
Dept: FAMILY MEDICINE CLINIC | Facility: CLINIC | Age: 58
End: 2018-08-27

## 2018-08-27 DIAGNOSIS — R97.20 ELEVATED PSA: Primary | ICD-10-CM

## 2018-08-27 LAB
ALBUMIN SERPL-MCNC: 4.4 G/DL (ref 3.6–5.1)
ALBUMIN/GLOB SERPL: 1.8 (CALC) (ref 1–2.5)
ALP SERPL-CCNC: 73 U/L (ref 40–115)
ALT SERPL-CCNC: 14 U/L (ref 9–46)
AST SERPL-CCNC: 15 U/L (ref 10–35)
BILIRUB SERPL-MCNC: 0.7 MG/DL (ref 0.2–1.2)
BUN SERPL-MCNC: 12 MG/DL (ref 7–25)
BUN/CREAT SERPL: NORMAL (CALC) (ref 6–22)
CALCIUM SERPL-MCNC: 9.3 MG/DL (ref 8.6–10.3)
CHLORIDE SERPL-SCNC: 105 MMOL/L (ref 98–110)
CHOLEST SERPL-MCNC: 176 MG/DL
CHOLEST/HDLC SERPL: 4.4 (CALC)
CO2 SERPL-SCNC: 26 MMOL/L (ref 20–32)
CREAT SERPL-MCNC: 0.92 MG/DL (ref 0.7–1.25)
ERYTHROCYTE [DISTWIDTH] IN BLOOD BY AUTOMATED COUNT: 13.8 % (ref 11–15)
GLOBULIN SER CALC-MCNC: 2.5 G/DL (CALC) (ref 1.9–3.7)
GLUCOSE SERPL-MCNC: 96 MG/DL (ref 65–99)
HCT VFR BLD AUTO: 41.9 % (ref 38.5–50)
HCV AB S/CO SERPL IA: 0.02
HCV AB SERPL QL IA: NORMAL
HDLC SERPL-MCNC: 40 MG/DL
HGB BLD-MCNC: 13.9 G/DL (ref 13.2–17.1)
LDLC SERPL CALC-MCNC: 108 MG/DL (CALC)
MCH RBC QN AUTO: 29.1 PG (ref 27–33)
MCHC RBC AUTO-ENTMCNC: 33.2 G/DL (ref 32–36)
MCV RBC AUTO: 87.7 FL (ref 80–100)
NONHDLC SERPL-MCNC: 136 MG/DL (CALC)
PLATELET # BLD AUTO: 310 THOUSAND/UL (ref 140–400)
PMV BLD REES-ECKER: 9.8 FL (ref 7.5–12.5)
POTASSIUM SERPL-SCNC: 4.4 MMOL/L (ref 3.5–5.3)
PROT SERPL-MCNC: 6.9 G/DL (ref 6.1–8.1)
PSA SERPL-MCNC: 4.4 NG/ML
RBC # BLD AUTO: 4.78 MILLION/UL (ref 4.2–5.8)
SL AMB EGFR AFRICAN AMERICAN: 104 ML/MIN/1.73M2
SL AMB EGFR NON AFRICAN AMERICAN: 90 ML/MIN/1.73M2
SODIUM SERPL-SCNC: 140 MMOL/L (ref 135–146)
TESTOST SERPL-MCNC: 279 NG/DL (ref 250–827)
TRIGL SERPL-MCNC: 167 MG/DL
TSH SERPL-ACNC: 1.14 MIU/L (ref 0.4–4.5)
WBC # BLD AUTO: 6.3 THOUSAND/UL (ref 3.8–10.8)

## 2018-08-27 NOTE — TELEPHONE ENCOUNTER
8/27/2018 2:28 PM Called Adiel regarding his PSA going up  It went from 3 to 4 4  His hepatitis C screening is negative  Triglycerides have improved  They are down from 348 to 167  Thyroid level, blood count, kidney function, liver function, glucose and testosterone levels are all normal       Message left on his voice mail to call the office      Geoffrey Penn DO

## 2018-08-28 PROBLEM — R97.20 ELEVATED PSA: Status: ACTIVE | Noted: 2018-08-28

## 2018-08-28 NOTE — TELEPHONE ENCOUNTER
8/28/2018 8:06 AM Called patient again  The [phone] number is still busy  I then tried the [phone] number and was able to leave him a voice mail    Raad Piedra DO

## 2018-08-30 NOTE — TELEPHONE ENCOUNTER
8/30/2018 11:57 AM spoke with patient regarding his lab results  We discussed his elevated PS A  He is having problems with urinary frequency  He would like to see Dr Dana Brunson for further evaluation  Will send a copy of the lab results for Dr Dana Brunson and he will call for an appointment    Dr Kelby Najjar

## 2018-08-30 NOTE — TELEPHONE ENCOUNTER
Please fax PSA level and testosterone from 08/25/2018  to Dr Lore Lambert in [address]      Thanks,  Eric Benitez DO

## 2018-09-27 ENCOUNTER — TELEPHONE (OUTPATIENT)
Dept: FAMILY MEDICINE CLINIC | Facility: CLINIC | Age: 58
End: 2018-09-27

## 2018-09-27 DIAGNOSIS — F41.1 GAD (GENERALIZED ANXIETY DISORDER): Primary | ICD-10-CM

## 2018-09-27 NOTE — TELEPHONE ENCOUNTER
DR MAYER   Please call [facility] back about this medication  She is in [place] and needs to speak to you

## 2018-09-28 RX ORDER — LAMOTRIGINE 200 MG/1
200 TABLET ORAL
Qty: 7 TABLET | Refills: 0 | Status: SHIPPED | OUTPATIENT
Start: 2018-09-28 | End: 2020-10-28

## 2018-09-28 NOTE — TELEPHONE ENCOUNTER
9/28/2018 8:42 AM returned call to Natalie Nolan  They are on vacation and he is going to run out of medication  They are staying an extra week and he is running out of medication    Dr Day Feeling won't refill the medication because [place]      New prescription for #7 sent    Message complete  Odette Diaz,

## 2019-10-21 ENCOUNTER — OFFICE VISIT (OUTPATIENT)
Dept: FAMILY MEDICINE CLINIC | Facility: CLINIC | Age: 59
End: 2019-10-21
Payer: COMMERCIAL

## 2019-10-21 VITALS
SYSTOLIC BLOOD PRESSURE: 135 MMHG | RESPIRATION RATE: 16 BRPM | DIASTOLIC BLOOD PRESSURE: 86 MMHG | BODY MASS INDEX: 31.21 KG/M2 | HEART RATE: 88 BPM | WEIGHT: 218 LBS | HEIGHT: 70 IN | TEMPERATURE: 98.5 F

## 2019-10-21 DIAGNOSIS — Z12.11 COLON CANCER SCREENING: ICD-10-CM

## 2019-10-21 DIAGNOSIS — Z13.0 SCREENING FOR DEFICIENCY ANEMIA: ICD-10-CM

## 2019-10-21 DIAGNOSIS — Z13.6 SCREENING FOR CARDIOVASCULAR CONDITION: ICD-10-CM

## 2019-10-21 DIAGNOSIS — N63.0 BREAST LUMP: Primary | ICD-10-CM

## 2019-10-21 DIAGNOSIS — Z23 NEED FOR VACCINATION: ICD-10-CM

## 2019-10-21 PROCEDURE — 90682 RIV4 VACC RECOMBINANT DNA IM: CPT

## 2019-10-21 PROCEDURE — 90471 IMMUNIZATION ADMIN: CPT

## 2019-10-21 PROCEDURE — 99213 OFFICE O/P EST LOW 20 MIN: CPT | Performed by: FAMILY MEDICINE

## 2019-10-21 NOTE — PROGRESS NOTES
Assessment/Plan:    1  Breast lump  Assessment & Plan:  Tender on exam     Orders:  -     Mammo diagnostic left w cad; Future; Expected date: 10/21/2019    2  Colon cancer screening  -     Ambulatory referral to Gastroenterology; Future    3  Need for vaccination  -     influenza vaccine, quadrivalent, recombinant, PF, 0 5 mL    4  Screening for cardiovascular condition  -     Comprehensive metabolic panel; Future  -     Lipid Panel with Direct LDL reflex; Future    5  Screening for deficiency anemia  -     CBC; Future        BMI Counseling: Body mass index is 31 28 kg/m²  Discussed the patient's BMI with him  The BMI is above normal  Exercise recommendations include exercising 3-5 times per week  There are no Patient Instructions on file for this visit  Return in about 6 months (around 4/21/2020) for Annual physical     Subjective:      Patient ID: Mitzi Cornell is a 62 y o  male  Chief Complaint   Patient presents with    Pain     left nipple area-for a few months--lj       He is having soreness by his left nipple for a few months  He has not felt lump  He denies any new soap  He denies any trauma  The following portions of the patient's history were reviewed and updated as appropriate:  past social history    Review of Systems      Current Outpatient Medications   Medication Sig Dispense Refill    clonazePAM (KlonoPIN) 2 mg tablet Take 1 tablet by mouth 2 (two) times a day      lamoTRIgine (LaMICtal) 200 MG tablet Take 1 tablet (200 mg total) by mouth daily at bedtime 7 tablet 0    QUEtiapine (SEROquel) 200 mg tablet Take 200 mg by mouth daily        traZODone (DESYREL) 300 MG tablet Take 300 mg by mouth daily at bedtime  0     No current facility-administered medications for this visit          Objective:    /86   Pulse 88   Temp 98 5 °F (36 9 °C)   Resp 16   Ht 5' 10" (1 778 m)   Wt 98 9 kg (218 lb)   BMI 31 28 kg/m²        Physical Exam   Constitutional: He appears well-developed and well-nourished  HENT:   Head: Normocephalic and atraumatic  Right Ear: External ear normal    Left Ear: External ear normal    Mouth/Throat: Oropharynx is clear and moist    Cardiovascular: Normal rate, regular rhythm and normal heart sounds  No murmur heard  Pulmonary/Chest: Effort normal and breath sounds normal  No respiratory distress  He has no wheezes  He has no rales  Musculoskeletal: He exhibits no edema or tenderness  Nursing note and vitals reviewed           left breast - tenderness and small lump in left breast    Juanjo Letty, DO

## 2019-11-06 ENCOUNTER — TELEPHONE (OUTPATIENT)
Dept: FAMILY MEDICINE CLINIC | Facility: CLINIC | Age: 59
End: 2019-11-06

## 2019-11-06 LAB
ALBUMIN SERPL-MCNC: 4.3 G/DL (ref 3.6–5.1)
ALBUMIN/GLOB SERPL: 1.7 (CALC) (ref 1–2.5)
ALP SERPL-CCNC: 64 U/L (ref 40–115)
ALT SERPL-CCNC: 14 U/L (ref 9–46)
AST SERPL-CCNC: 14 U/L (ref 10–35)
BASOPHILS # BLD AUTO: 81 CELLS/UL (ref 0–200)
BASOPHILS NFR BLD AUTO: 1.4 %
BILIRUB SERPL-MCNC: 0.5 MG/DL (ref 0.2–1.2)
BUN SERPL-MCNC: 9 MG/DL (ref 7–25)
BUN/CREAT SERPL: NORMAL (CALC) (ref 6–22)
CALCIUM SERPL-MCNC: 9.4 MG/DL (ref 8.6–10.3)
CHLORIDE SERPL-SCNC: 102 MMOL/L (ref 98–110)
CHOLEST SERPL-MCNC: 242 MG/DL
CHOLEST/HDLC SERPL: 5.9 (CALC)
CO2 SERPL-SCNC: 27 MMOL/L (ref 20–32)
CREAT SERPL-MCNC: 0.86 MG/DL (ref 0.7–1.25)
EOSINOPHIL # BLD AUTO: 180 CELLS/UL (ref 15–500)
EOSINOPHIL NFR BLD AUTO: 3.1 %
ERYTHROCYTE [DISTWIDTH] IN BLOOD BY AUTOMATED COUNT: 13.6 % (ref 11–15)
GLOBULIN SER CALC-MCNC: 2.5 G/DL (CALC) (ref 1.9–3.7)
GLUCOSE SERPL-MCNC: 93 MG/DL (ref 65–99)
HCT VFR BLD AUTO: 43.7 % (ref 38.5–50)
HDLC SERPL-MCNC: 41 MG/DL
HGB BLD-MCNC: 14.6 G/DL (ref 13.2–17.1)
LDLC SERPL CALC-MCNC: 144 MG/DL (CALC)
LYMPHOCYTES # BLD AUTO: 2291 CELLS/UL (ref 850–3900)
LYMPHOCYTES NFR BLD AUTO: 39.5 %
MCH RBC QN AUTO: 29.3 PG (ref 27–33)
MCHC RBC AUTO-ENTMCNC: 33.4 G/DL (ref 32–36)
MCV RBC AUTO: 87.8 FL (ref 80–100)
MONOCYTES # BLD AUTO: 510 CELLS/UL (ref 200–950)
MONOCYTES NFR BLD AUTO: 8.8 %
NEUTROPHILS # BLD AUTO: 2738 CELLS/UL (ref 1500–7800)
NEUTROPHILS NFR BLD AUTO: 47.2 %
NONHDLC SERPL-MCNC: 201 MG/DL (CALC)
PLATELET # BLD AUTO: 299 THOUSAND/UL (ref 140–400)
PMV BLD REES-ECKER: 9.8 FL (ref 7.5–12.5)
POTASSIUM SERPL-SCNC: 4.4 MMOL/L (ref 3.5–5.3)
PROT SERPL-MCNC: 6.8 G/DL (ref 6.1–8.1)
RBC # BLD AUTO: 4.98 MILLION/UL (ref 4.2–5.8)
SL AMB EGFR AFRICAN AMERICAN: 108 ML/MIN/1.73M2
SL AMB EGFR NON AFRICAN AMERICAN: 94 ML/MIN/1.73M2
SODIUM SERPL-SCNC: 140 MMOL/L (ref 135–146)
TRIGL SERPL-MCNC: 369 MG/DL
WBC # BLD AUTO: 5.8 THOUSAND/UL (ref 3.8–10.8)

## 2019-11-06 NOTE — TELEPHONE ENCOUNTER
----- Message from Tod Mcmanus DO sent at 11/6/2019  7:53 AM EST -----  Please call patient regarding his lab results  His cholesterol is back up to 242 and his triglycerides are up to 369  I would like him to make an appointment to discuss his results and possible treatment options    Thank you,  Tod Mcmanus DO

## 2019-11-07 NOTE — TELEPHONE ENCOUNTER
Spoke to patient and informed of note   He stated that he will call back to schedule appt  No further action   Adore Harris

## 2020-07-07 DIAGNOSIS — F41.9 ANXIETY: Primary | ICD-10-CM

## 2020-07-07 DIAGNOSIS — F33.41 RECURRENT MAJOR DEPRESSIVE DISORDER, IN PARTIAL REMISSION (HCC): Primary | ICD-10-CM

## 2020-07-07 RX ORDER — VENLAFAXINE HYDROCHLORIDE 75 MG/1
75 CAPSULE, EXTENDED RELEASE ORAL DAILY
Qty: 30 CAPSULE | Refills: 3 | Status: SHIPPED | OUTPATIENT
Start: 2020-07-07 | End: 2020-07-28

## 2020-07-07 RX ORDER — TRAZODONE HYDROCHLORIDE 300 MG/1
300 TABLET ORAL
Qty: 30 TABLET | Refills: 3 | Status: SHIPPED | OUTPATIENT
Start: 2020-07-07 | End: 2020-07-28 | Stop reason: SDUPTHER

## 2020-07-13 DIAGNOSIS — F41.9 ANXIETY: Primary | ICD-10-CM

## 2020-07-13 RX ORDER — CLONAZEPAM 2 MG/1
2 TABLET ORAL 2 TIMES DAILY
Qty: 60 TABLET | Refills: 0 | Status: SHIPPED | OUTPATIENT
Start: 2020-07-13 | End: 2020-08-10 | Stop reason: SDUPTHER

## 2020-07-28 ENCOUNTER — TELEMEDICINE (OUTPATIENT)
Dept: PSYCHIATRY | Facility: CLINIC | Age: 60
End: 2020-07-28
Payer: COMMERCIAL

## 2020-07-28 DIAGNOSIS — F41.9 ANXIETY: ICD-10-CM

## 2020-07-28 DIAGNOSIS — G47.00 INSOMNIA, UNSPECIFIED TYPE: ICD-10-CM

## 2020-07-28 DIAGNOSIS — F41.1 GAD (GENERALIZED ANXIETY DISORDER): ICD-10-CM

## 2020-07-28 PROCEDURE — 3075F SYST BP GE 130 - 139MM HG: CPT | Performed by: NURSE PRACTITIONER

## 2020-07-28 PROCEDURE — 99214 OFFICE O/P EST MOD 30 MIN: CPT | Performed by: NURSE PRACTITIONER

## 2020-07-28 PROCEDURE — 3079F DIAST BP 80-89 MM HG: CPT | Performed by: NURSE PRACTITIONER

## 2020-07-28 RX ORDER — QUETIAPINE FUMARATE 50 MG/1
50 TABLET, FILM COATED ORAL
Qty: 30 TABLET | Refills: 3 | Status: SHIPPED | OUTPATIENT
Start: 2020-07-28 | End: 2020-10-20

## 2020-07-28 RX ORDER — TRAZODONE HYDROCHLORIDE 300 MG/1
300 TABLET ORAL
Qty: 30 TABLET | Refills: 3 | Status: SHIPPED | OUTPATIENT
Start: 2020-07-28 | End: 2020-10-21

## 2020-07-28 RX ORDER — DULOXETIN HYDROCHLORIDE 20 MG/1
40 CAPSULE, DELAYED RELEASE ORAL DAILY
Qty: 60 CAPSULE | Refills: 3 | Status: SHIPPED | OUTPATIENT
Start: 2020-07-28 | End: 2020-10-20

## 2020-07-28 NOTE — PSYCH
Virtual Regular Visit    Problem List Items Addressed This Visit        Other    OSITO (generalized anxiety disorder) - Primary    Insomnia        Reason for visit is   Chief Complaint   Patient presents with    Medication Management     Encounter provider Biju Hatfield PhD    Provider located at [address] [place] [address]  [address]  [place] [address]  [phone]    Recent Visits  No visits were found meeting these conditions  Showing recent visits within past 7 days and meeting all other requirements     Today's Visits  Date Type Provider Dept   07/28/20 Telemedicine Biju Hatfield PhD [facility]   Showing today's visits and meeting all other requirements     Future Appointments  No visits were found meeting these conditions  Showing future appointments within next 150 days and meeting all other requirements        After connecting through [place], the patient was identified by name and date of birth  Jamal Palma was informed that this is a telemedicine visit and that the visit is being conducted through phone and patient was informed that this is not a secure, HIPAA-complaint platform  He agrees to proceed  which may not be secure and therefore, might not be HIPAA-compliant  My office door was closed  No one else was in the room  He acknowledged consent and understanding of privacy and security of the video platform  The patient has agreed to participate and understands they can discontinue the visit at any time  SUBJECTIVE:    Jamal Palma is a 56 y o  male with a history of anxiety and depression seen for medication, management and support  Reports he is eating and sleeping well  Wife is supportive  Has depression and lack of motivation  Denies side effects  Reports effexor does not do anything for him  Denies SI  Takes medication as prescribed       HPI ROS Appetite Changes and Sleep: normal appetite and normal energy level    Review Of Systems:     Mood Depression   Behavior Normal    Thought Content Normal   General Normal    Personality Normal   Other Psych Symptoms Normal   Constitutional Feeling Tired   ENT As Noted in HPI   Cardiovascular As Noted in HPI   Respiratory Normal    Gastrointestinal Normal   Genitourinary Normal    Musculoskeletal Negative   Integumentary Normal    Neurological As Noted in HPI   Endocrine Normal    Other Symptoms Normal        Substance Abuse History:    Social History     Substance and Sexual Activity   Drug Use No       Family Psychiatric History:     Family History   Problem Relation Age of Onset    Suicide Attempts Father     Depression Father     Hyperlipidemia Maternal Aunt     Stroke Maternal Aunt     Cervical cancer Family        Social History     Socioeconomic History    Marital status: /Civil Union     Spouse name: Not on file    Number of children: Not on file    Years of education: Not on file    Highest education level: Not on file   Occupational History    Occupation: retired     Social Needs    Financial resource strain: Not on file    Food insecurity:     Worry: Not on file     Inability: Not on file   [employer] needs:     Medical: Not on file     Non-medical: Not on file   Tobacco Use    Smoking status: Current Every Day Smoker    Smokeless tobacco: Never Used    Tobacco comment: quit about 1-2 years ago       Substance and Sexual Activity    Alcohol use: Yes     Comment: rarely; social    Drug use: No    Sexual activity: Not Currently   Lifestyle    Physical activity:     Days per week: Not on file     Minutes per session: Not on file    Stress: Not on file   Relationships    Social connections:     Talks on phone: Not on file     Gets together: Not on file     Attends Holiness service: Not on file     Active member of club or organization: Not on file     Attends meetings of clubs or organizations: Not on file     Relationship status: Not on file    Intimate partner violence:     Fear of current or ex partner: Not on file     Emotionally abused: Not on file     Physically abused: Not on file     Forced sexual activity: Not on file   Other Topics Concern    Not on file   Social History Narrative    Daily coffee consumption       Past Medical History:   Diagnosis Date    Anxiety     BPH (benign prostatic hyperplasia)     Depressed     Hayfever     Inguinal hernia     last assessed: 1/21/16    Mild depressive disorder ([facility] [address] )     last assessed: 2/24/16    Suicide [facility])        Past Surgical History:   Procedure Laterality Date    HERNIA REPAIR Bilateral     5 months ago   KNEE ARTHROSCOPY Left     Possible ACL repair    KNEE SURGERY      ROTATOR CUFF REPAIR Left     about 1 year ago       Current Outpatient Medications   Medication Sig Dispense Refill    clonazePAM (KlonoPIN) 2 mg tablet Take 1 tablet (2 mg total) by mouth 2 (two) times a day 60 tablet 0    lamoTRIgine (LaMICtal) 200 MG tablet Take 1 tablet (200 mg total) by mouth daily at bedtime 7 tablet 0    QUEtiapine (SEROquel) 200 mg tablet Take 200 mg by mouth daily        traZODone (DESYREL) 300 MG tablet Take 1 tablet (300 mg total) by mouth daily at bedtime 30 tablet 3    venlafaxine (EFFEXOR-XR) 75 mg 24 hr capsule Take 1 capsule (75 mg total) by mouth daily 30 capsule 3     No current facility-administered medications for this visit  Allergies   Allergen Reactions    Amoxicillin Visual Disturbance     Reaction Date: [id number];     Motrin [Ibuprofen]      Reaction Date: [id number];    Lips swelled    Other      Pt "I think the antibiotic is Avelox"       reviewed medication    OBJECTIVE:     Mental Status Examination:    Appearance calm and cooperative    Mood depressed   Affect virtural vs   Speech a normal rate   Thought Processes normal thought processes   Hallucinations no hallucinations present    Thought Content no delusions   Abnormal Thoughts no suicidal thoughts  and no homicidal thoughts    Orientation  oriented to person and place and time   Remote Memory short term memory intact and long term memory intact   Attention Span concentration intact   Intellect Appears to be of Average Intelligence   Insight Insight intact   Judgement judgment was intact   Muscle Strength denies problems   Language no difficulty naming common objects   Fund of Knowledge displays adequate knowledge of current events   Pain none   Pain Scale 0       Laboratory Results: No results found for this or any previous visit  Assessment/Plan:       Diagnoses and all orders for this visit:    OSITO (generalized anxiety disorder)    Insomnia, unspecified type          Treatment Recommendations- Risks Benefits      Immediate Medical/Psychiatric/Psychotherapy Treatments and Any Precautions: Discussed changing to Cymbalta  He agreed  Support provided    Risks, Benefits And Possible Side Effects Of Medications: Discussed Cymbalta and possible side effects  Controlled Medication Discussion: Discussed with patient the risks of sedation, respiratory depression, impairment of ability to drive and potential for abuse and addiction related to treatment with benzodiazepine medications  The patient understands risk of treatment with benzodiazepine medications, agrees to not drive if feels impaired and agrees to take medications as prescribed  Psychotherapy Provided: Support and medication management      Goals discussed in session: stable mood    Counseling provided: 25     Treatment Plan:    Completed and signed during the session: Yes - Treatment Plan done but not signed at time of office visit due to:  Plan reviewed by phone and verbal consent given due to [address] distancing    I spent 25 minutes with the patient during this visit      Evaristo Medina, PhD 07/28/20

## 2020-07-28 NOTE — BH TREATMENT PLAN
TREATMENT PLAN (Medication Management Only)        [facility]    Name and Date of Birth:  Johan Rivers 56 y o  1960  Date of Treatment Plan: July 28, 2020  Diagnosis/Diagnoses:    1  OSITO (generalized anxiety disorder)    2  Insomnia, unspecified type      Strengths/Personal Resources for Self-Care: supportive family, taking medications as prescribed, ability to communicate needs, ability to listen, ability to reason  Area/Areas of need (in own words): anxiety symptoms, depressive symptoms  1  Long Term Goal: improve control of depression  Target Date: 2 months - 9/28/2020  Person/Persons responsible for completion of goal: [place] and provider  2  Short Term Objective (s) - How will we reach this goal?:   A  Provider new recommended medication/dosage changes and/or continue medication(s): continue current medications as prescribed (change effexor to cymbalta)  B  structure in day  C  adequate rest and sleep  Target Date: 6 months - 1/28/2021  Person/Persons Responsible for Completion of Goal: [place] and provider  Progress Towards Goals: continuing treatment  Treatment Modality: medication management every 1-3  months as needed  Review due 90 to 120 days from date of this plan: 6 months 01/28/2021  Expected length of service: ongoing treatment  My Physician/PA/NP and I have developed this plan together and I agree to work on the goals and objectives  I understand the treatment goals that were developed for my treatment

## 2020-07-29 DIAGNOSIS — F41.1 GAD (GENERALIZED ANXIETY DISORDER): Primary | ICD-10-CM

## 2020-07-29 RX ORDER — QUETIAPINE FUMARATE 100 MG/1
TABLET, FILM COATED ORAL
Qty: 180 TABLET | Refills: 0 | Status: SHIPPED | OUTPATIENT
Start: 2020-07-29 | End: 2020-08-13 | Stop reason: SDUPTHER

## 2020-08-10 DIAGNOSIS — F41.9 ANXIETY: ICD-10-CM

## 2020-08-11 DIAGNOSIS — F41.1 GAD (GENERALIZED ANXIETY DISORDER): ICD-10-CM

## 2020-08-11 RX ORDER — CLONAZEPAM 2 MG/1
2 TABLET ORAL 2 TIMES DAILY
Qty: 60 TABLET | Refills: 0 | Status: SHIPPED | OUTPATIENT
Start: 2020-08-11 | End: 2020-09-08 | Stop reason: SDUPTHER

## 2020-08-13 DIAGNOSIS — F41.1 GAD (GENERALIZED ANXIETY DISORDER): ICD-10-CM

## 2020-08-13 RX ORDER — QUETIAPINE FUMARATE 100 MG/1
200 TABLET, FILM COATED ORAL
Qty: 180 TABLET | Refills: 0 | Status: SHIPPED | OUTPATIENT
Start: 2020-08-13 | End: 2020-11-06 | Stop reason: SDUPTHER

## 2020-08-17 RX ORDER — QUETIAPINE FUMARATE 100 MG/1
TABLET, FILM COATED ORAL
Qty: 180 TABLET | Refills: 1 | OUTPATIENT
Start: 2020-08-17

## 2020-09-08 DIAGNOSIS — F41.9 ANXIETY: ICD-10-CM

## 2020-09-08 RX ORDER — CLONAZEPAM 2 MG/1
2 TABLET ORAL 2 TIMES DAILY
Qty: 60 TABLET | Refills: 0 | Status: SHIPPED | OUTPATIENT
Start: 2020-09-08 | End: 2020-10-06 | Stop reason: SDUPTHER

## 2020-09-17 DIAGNOSIS — Z12.11 COLON CANCER SCREENING: Primary | ICD-10-CM

## 2020-10-06 DIAGNOSIS — F41.9 ANXIETY: ICD-10-CM

## 2020-10-06 RX ORDER — CLONAZEPAM 2 MG/1
2 TABLET ORAL 2 TIMES DAILY
Qty: 60 TABLET | Refills: 0 | Status: SHIPPED | OUTPATIENT
Start: 2020-10-06 | End: 2020-11-07

## 2020-10-19 DIAGNOSIS — F41.1 GAD (GENERALIZED ANXIETY DISORDER): ICD-10-CM

## 2020-10-19 DIAGNOSIS — F41.9 ANXIETY: ICD-10-CM

## 2020-10-20 RX ORDER — DULOXETIN HYDROCHLORIDE 20 MG/1
CAPSULE, DELAYED RELEASE ORAL
Qty: 180 CAPSULE | Refills: 1 | Status: SHIPPED | OUTPATIENT
Start: 2020-10-20 | End: 2021-01-20 | Stop reason: SDUPTHER

## 2020-10-20 RX ORDER — QUETIAPINE FUMARATE 50 MG/1
50 TABLET, FILM COATED ORAL
Qty: 90 TABLET | Refills: 0 | Status: SHIPPED | OUTPATIENT
Start: 2020-10-20 | End: 2021-01-04 | Stop reason: SDUPTHER

## 2020-10-21 DIAGNOSIS — G47.00 INSOMNIA, UNSPECIFIED TYPE: ICD-10-CM

## 2020-10-21 DIAGNOSIS — F41.9 ANXIETY: ICD-10-CM

## 2020-10-21 RX ORDER — TRAZODONE HYDROCHLORIDE 300 MG/1
TABLET ORAL
Qty: 90 TABLET | Refills: 0 | Status: SHIPPED | OUTPATIENT
Start: 2020-10-21 | End: 2021-01-18

## 2020-10-28 ENCOUNTER — OFFICE VISIT (OUTPATIENT)
Dept: PSYCHIATRY | Facility: CLINIC | Age: 60
End: 2020-10-28
Payer: COMMERCIAL

## 2020-10-28 VITALS
SYSTOLIC BLOOD PRESSURE: 133 MMHG | BODY MASS INDEX: 31.92 KG/M2 | WEIGHT: 223 LBS | DIASTOLIC BLOOD PRESSURE: 85 MMHG | HEIGHT: 70 IN | HEART RATE: 83 BPM

## 2020-10-28 DIAGNOSIS — F41.1 GAD (GENERALIZED ANXIETY DISORDER): ICD-10-CM

## 2020-10-28 DIAGNOSIS — Z79.899 HISTORY OF LONG-TERM TREATMENT WITH HIGH-RISK MEDICATION: ICD-10-CM

## 2020-10-28 DIAGNOSIS — F33.1 MODERATE EPISODE OF RECURRENT MAJOR DEPRESSIVE DISORDER (HCC): Primary | ICD-10-CM

## 2020-10-28 PROCEDURE — 3008F BODY MASS INDEX DOCD: CPT | Performed by: NURSE PRACTITIONER

## 2020-10-28 PROCEDURE — 99214 OFFICE O/P EST MOD 30 MIN: CPT | Performed by: NURSE PRACTITIONER

## 2020-11-03 DIAGNOSIS — F41.1 GAD (GENERALIZED ANXIETY DISORDER): ICD-10-CM

## 2020-11-06 DIAGNOSIS — F41.9 ANXIETY: ICD-10-CM

## 2020-11-06 DIAGNOSIS — F41.1 GAD (GENERALIZED ANXIETY DISORDER): ICD-10-CM

## 2020-11-07 RX ORDER — QUETIAPINE FUMARATE 100 MG/1
200 TABLET, FILM COATED ORAL
Qty: 180 TABLET | Refills: 0 | Status: SHIPPED | OUTPATIENT
Start: 2020-11-07 | End: 2020-11-10 | Stop reason: SDUPTHER

## 2020-11-07 RX ORDER — CLONAZEPAM 2 MG/1
2 TABLET ORAL 2 TIMES DAILY
Qty: 60 TABLET | Refills: 0 | Status: SHIPPED | OUTPATIENT
Start: 2020-11-07 | End: 2021-01-04 | Stop reason: SDUPTHER

## 2020-11-07 RX ORDER — CLONAZEPAM 2 MG/1
2 TABLET ORAL 2 TIMES DAILY
Qty: 60 TABLET | Refills: 0 | Status: SHIPPED | OUTPATIENT
Start: 2020-11-07 | End: 2020-12-04 | Stop reason: SDUPTHER

## 2020-11-10 DIAGNOSIS — F41.1 GAD (GENERALIZED ANXIETY DISORDER): ICD-10-CM

## 2020-11-10 RX ORDER — QUETIAPINE FUMARATE 100 MG/1
200 TABLET, FILM COATED ORAL
Qty: 180 TABLET | Refills: 0 | OUTPATIENT
Start: 2020-11-10

## 2020-11-10 RX ORDER — QUETIAPINE FUMARATE 100 MG/1
200 TABLET, FILM COATED ORAL
Qty: 180 TABLET | Refills: 0 | Status: SHIPPED | OUTPATIENT
Start: 2020-11-10 | End: 2021-04-19 | Stop reason: SDUPTHER

## 2020-12-04 DIAGNOSIS — F41.9 ANXIETY: ICD-10-CM

## 2020-12-05 RX ORDER — CLONAZEPAM 2 MG/1
2 TABLET ORAL 2 TIMES DAILY
Qty: 60 TABLET | Refills: 0 | Status: SHIPPED | OUTPATIENT
Start: 2020-12-05 | End: 2021-02-02 | Stop reason: SDUPTHER

## 2021-01-04 DIAGNOSIS — F41.9 ANXIETY: ICD-10-CM

## 2021-01-04 RX ORDER — CLONAZEPAM 2 MG/1
2 TABLET ORAL 2 TIMES DAILY
Qty: 60 TABLET | Refills: 0 | Status: SHIPPED | OUTPATIENT
Start: 2021-01-04 | End: 2021-03-01 | Stop reason: SDUPTHER

## 2021-01-04 RX ORDER — QUETIAPINE FUMARATE 50 MG/1
50 TABLET, FILM COATED ORAL
Qty: 90 TABLET | Refills: 0 | Status: SHIPPED | OUTPATIENT
Start: 2021-01-04 | End: 2021-03-29

## 2021-01-17 DIAGNOSIS — G47.00 INSOMNIA, UNSPECIFIED TYPE: ICD-10-CM

## 2021-01-17 DIAGNOSIS — F41.9 ANXIETY: ICD-10-CM

## 2021-01-18 RX ORDER — TRAZODONE HYDROCHLORIDE 300 MG/1
TABLET ORAL
Qty: 90 TABLET | Refills: 0 | Status: SHIPPED | OUTPATIENT
Start: 2021-01-18 | End: 2021-01-20 | Stop reason: SDUPTHER

## 2021-01-20 DIAGNOSIS — F41.1 GAD (GENERALIZED ANXIETY DISORDER): ICD-10-CM

## 2021-01-20 DIAGNOSIS — G47.00 INSOMNIA, UNSPECIFIED TYPE: ICD-10-CM

## 2021-01-20 DIAGNOSIS — F41.9 ANXIETY: ICD-10-CM

## 2021-01-20 RX ORDER — TRAZODONE HYDROCHLORIDE 300 MG/1
300 TABLET ORAL
Qty: 90 TABLET | Refills: 0 | Status: SHIPPED | OUTPATIENT
Start: 2021-01-20 | End: 2021-04-19 | Stop reason: SDUPTHER

## 2021-01-20 RX ORDER — DULOXETIN HYDROCHLORIDE 20 MG/1
40 CAPSULE, DELAYED RELEASE ORAL DAILY
Qty: 60 CAPSULE | Refills: 1 | Status: SHIPPED | OUTPATIENT
Start: 2021-01-20 | End: 2021-02-13

## 2021-01-28 ENCOUNTER — TELEMEDICINE (OUTPATIENT)
Dept: PSYCHIATRY | Facility: CLINIC | Age: 61
End: 2021-01-28
Payer: COMMERCIAL

## 2021-01-28 DIAGNOSIS — G47.00 INSOMNIA, UNSPECIFIED TYPE: ICD-10-CM

## 2021-01-28 DIAGNOSIS — E55.9 VITAMIN D DEFICIENCY: ICD-10-CM

## 2021-01-28 DIAGNOSIS — F41.1 GAD (GENERALIZED ANXIETY DISORDER): Primary | ICD-10-CM

## 2021-01-28 DIAGNOSIS — F33.1 MODERATE EPISODE OF RECURRENT MAJOR DEPRESSIVE DISORDER (HCC): ICD-10-CM

## 2021-01-28 PROCEDURE — 99215 OFFICE O/P EST HI 40 MIN: CPT | Performed by: NURSE PRACTITIONER

## 2021-01-28 RX ORDER — ERGOCALCIFEROL 1.25 MG/1
50000 CAPSULE ORAL WEEKLY
Qty: 12 CAPSULE | Refills: 0 | Status: SHIPPED | OUTPATIENT
Start: 2021-01-28

## 2021-01-31 NOTE — PSYCH
Virtual Regular Visit    Problem List Items Addressed This Visit        Other    Recurrent major depressive disorder ([address] )    OSITO (generalized anxiety disorder) - Primary    Insomnia      Other Visit Diagnoses     Vitamin D deficiency        Relevant Medications    ergocalciferol (VITAMIN D2) 50,000 units        Reason for visit is   Chief Complaint   Patient presents with    Depression    Mood Swings    Medication Management     Encounter provider Jonathan Sue, PhD    Provider located at [address] [place] [address]  [address]  [place] [address]  [phone]    Recent Visits  Date Type Provider Dept   01/28/21 Telemedicine Jonathan Sue, PhD [facility] recent visits within past 7 days and meeting all other requirements     Future Appointments  No visits were found meeting these conditions  Showing future appointments within next 150 days and meeting all other requirements        After connecting through [employer], the patient was identified by name and date of birth  Renny Knight was informed that this is a telemedicine visit and that the visit is being conducted through phone and patient was informed that this is not a secure, HIPAA-compliant platform  He agrees to proceed  My door was closed No one else was in the room  He acknowledged consent and understanding of privacy and security of the video platform  The patient has agreed to participate and understands they can discontinue the visit at any time  SUBJECTIVE:    Renny Knight is a 56 y o  male with a history of depression, mood swings seen for medication management  Madhavi Moreira reports, he is depressed, appetite is decreased, sleeping well  Denies SI, has problems with new house  Lacks motivation  Wife is supportive  Takes medication as prescribed       HPI ROS Appetite Changes and Sleep: decreased appetite and decreased energy    Review Of Systems:     Mood Depression   Behavior Normal    Thought Content Normal   General Emotional Problems   Personality Normal   Other Psych Symptoms Normal   Constitutional As Noted in HPI   ENT As Noted in HPI   Cardiovascular As Noted in HPI   Respiratory As Noted in HPI   Gastrointestinal As Noted in HPI   Genitourinary As Noted in HPI   Musculoskeletal As Noted in HPI   Integumentary As Noted in HPI   Neurological As Noted in HPI   Endocrine Normal    Other Symptoms Normal        Substance Abuse History:    Social History     Substance and Sexual Activity   Drug Use No       Family Psychiatric History:     Family History   Problem Relation Age of Onset    Suicide Attempts Father     Depression Father     Hyperlipidemia Maternal Aunt     Stroke Maternal Aunt     Cervical cancer Family        Social History     Socioeconomic History    Marital status: /Civil Union     Spouse name: Not on file    Number of children: Not on file    Years of education: Not on file    Highest education level: Not on file   Occupational History    Occupation: retired     Social Needs    Financial resource strain: Not on file    Food insecurity     Worry: Not on file     Inability: Not on file   [employer] needs     Medical: Not on file     Non-medical: Not on file   Tobacco Use    Smoking status: Current Every Day Smoker    Smokeless tobacco: Never Used    Tobacco comment: quit about 1-2 years ago       Substance and Sexual Activity    Alcohol use: Yes     Comment: rarely; social    Drug use: No    Sexual activity: Not Currently   Lifestyle    Physical activity     Days per week: Not on file     Minutes per session: Not on file    Stress: Not on file   Relationships    Social connections     Talks on phone: Not on file     Gets together: Not on file     Attends Congregational service: Not on file     Active member of club or organization: Not on file     Attends meetings of clubs or organizations: Not on file     Relationship status: Not on file    Intimate partner violence     Fear of current or ex partner: Not on file     Emotionally abused: Not on file     Physically abused: Not on file     Forced sexual activity: Not on file   Other Topics Concern    Not on file   Social History Narrative    Daily coffee consumption       Past Medical History:   Diagnosis Date    Anxiety     BPH (benign prostatic hyperplasia)     Depressed     Hayfever     Inguinal hernia     last assessed: 1/21/16    Mild depressive disorder ([facility] [address] )     last assessed: 2/24/16    Suicide [facility])        Past Surgical History:   Procedure Laterality Date    HERNIA REPAIR Bilateral     5 months ago   KNEE ARTHROSCOPY Left     Possible ACL repair    KNEE SURGERY      ROTATOR CUFF REPAIR Left     about 1 year ago       Current Outpatient Medications   Medication Sig Dispense Refill    clonazePAM (KlonoPIN) 2 mg tablet Take 1 tablet (2 mg total) by mouth 2 (two) times a day 60 tablet 0    clonazePAM (KlonoPIN) 2 mg tablet Take 1 tablet (2 mg total) by mouth 2 (two) times a day 60 tablet 0    DULoxetine (CYMBALTA) 20 mg capsule Take 2 capsules (40 mg total) by mouth daily 60 capsule 1    ergocalciferol (VITAMIN D2) 50,000 units Take 1 capsule (50,000 Units total) by mouth once a week 12 capsule 0    QUEtiapine (SEROquel) 100 mg tablet Take 2 tablets (200 mg total) by mouth daily at bedtime 180 tablet 0    QUEtiapine (SEROquel) 50 mg tablet Take 1 tablet (50 mg total) by mouth daily after breakfast 90 tablet 0    traZODone (DESYREL) 300 MG tablet Take 1 tablet (300 mg total) by mouth daily at bedtime 90 tablet 0     No current facility-administered medications for this visit  Allergies   Allergen Reactions    Amoxicillin Visual Disturbance     Reaction Date: [id number];     Motrin [Ibuprofen]      Reaction Date: [id number];    Lips swelled    Other      Pt "I think the antibiotic is Avelox"       Reviewed medication    OBJECTIVE:     Mental Status Examination:    Appearance phone session   Mood mood appropriate   Affect phone session   Speech a normal rate   Thought Processes normal thought processes   Hallucinations no hallucinations present    Thought Content no delusions   Abnormal Thoughts no suicidal thoughts    Orientation  oriented to person   Remote Memory short term memory intact and long term memory intact   Attention Span concentration intact   Intellect Appears to be of Average Intelligence   Insight Insight intact   Judgement judgment was intact   Muscle Strength denies problems   Language no difficulty naming common objects   Fund of Knowledge displays adequate knowledge of current events   Pain none   Pain Scale 0       Laboratory Results: No results found for this or any previous visit  Assessment/Plan:       Diagnoses and all orders for this visit:    OSITO (generalized anxiety disorder)    Moderate episode of recurrent major depressive disorder (HCC)    Insomnia, unspecified type    Vitamin D deficiency  -     ergocalciferol (VITAMIN D2) 50,000 units; Take 1 capsule (50,000 Units total) by mouth once a week          Treatment Recommendations- Risks Benefits      Immediate Medical/Psychiatric/Psychotherapy Treatments and Any Precautions: Reviewed medications, results of lab work discussed, diet guidance and prescription for Vitamin D reviewed  Support provided  Risks, Benefits And Possible Side Effects Of Medications:  {PSYCH RISK, BENEFITS AND POSSIBLE SIDE EFFECTS (Optional):72938    Controlled Medication Discussion: Discussed with patient the risks of sedation, respiratory depression, impairment of ability to drive and potential for abuse and addiction related to treatment with benzodiazepine medications  The patient understands risk of treatment with benzodiazepine medications, agrees to not drive if feels impaired and agrees to take medications as prescribed   and he is aware of safe use and storage of medications     Psychotherapy Provided: support, diet, lab results reviewed    Goals discussed in session:reduce depression    Counseling provided: 40     Treatment Plan:    Completed and signed during the session: Not applicable - Treatment Plan not due at this session, enacted 7/28/2020, updated 10/28/2020     I spent 30 minutes with the patient during this visit and 10 min reviewing Epic for all data/events relevant to this visit, composing this note and conducting other visit follow up activities    Len Infante, PhD 01/31/21

## 2021-02-02 DIAGNOSIS — F41.9 ANXIETY: ICD-10-CM

## 2021-02-02 RX ORDER — CLONAZEPAM 2 MG/1
2 TABLET ORAL 2 TIMES DAILY
Qty: 60 TABLET | Refills: 0 | Status: SHIPPED | OUTPATIENT
Start: 2021-02-02 | End: 2021-03-31 | Stop reason: SDUPTHER

## 2021-02-12 DIAGNOSIS — F41.1 GAD (GENERALIZED ANXIETY DISORDER): ICD-10-CM

## 2021-02-13 RX ORDER — DULOXETIN HYDROCHLORIDE 20 MG/1
CAPSULE, DELAYED RELEASE ORAL
Qty: 60 CAPSULE | Refills: 3 | Status: SHIPPED | OUTPATIENT
Start: 2021-02-13 | End: 2021-05-16

## 2021-03-01 DIAGNOSIS — F41.9 ANXIETY: ICD-10-CM

## 2021-03-01 RX ORDER — CLONAZEPAM 2 MG/1
2 TABLET ORAL 2 TIMES DAILY
Qty: 60 TABLET | Refills: 0 | Status: SHIPPED | OUTPATIENT
Start: 2021-03-01 | End: 2021-05-04 | Stop reason: SDUPTHER

## 2021-03-29 DIAGNOSIS — F41.9 ANXIETY: ICD-10-CM

## 2021-03-29 RX ORDER — QUETIAPINE FUMARATE 50 MG/1
50 TABLET, FILM COATED ORAL
Qty: 90 TABLET | Refills: 0 | Status: SHIPPED | OUTPATIENT
Start: 2021-03-29 | End: 2021-03-31 | Stop reason: SDUPTHER

## 2021-03-31 DIAGNOSIS — F41.9 ANXIETY: ICD-10-CM

## 2021-03-31 RX ORDER — QUETIAPINE FUMARATE 50 MG/1
50 TABLET, FILM COATED ORAL
Qty: 90 TABLET | Refills: 0 | Status: SHIPPED | OUTPATIENT
Start: 2021-03-31 | End: 2021-06-30 | Stop reason: SDUPTHER

## 2021-03-31 RX ORDER — CLONAZEPAM 2 MG/1
2 TABLET ORAL 2 TIMES DAILY
Qty: 60 TABLET | Refills: 0 | Status: SHIPPED | OUTPATIENT
Start: 2021-03-31 | End: 2021-06-03 | Stop reason: SDUPTHER

## 2021-04-19 DIAGNOSIS — F41.1 GAD (GENERALIZED ANXIETY DISORDER): ICD-10-CM

## 2021-04-19 DIAGNOSIS — G47.00 INSOMNIA, UNSPECIFIED TYPE: ICD-10-CM

## 2021-04-19 DIAGNOSIS — F41.9 ANXIETY: ICD-10-CM

## 2021-04-19 RX ORDER — TRAZODONE HYDROCHLORIDE 300 MG/1
300 TABLET ORAL
Qty: 90 TABLET | Refills: 0 | Status: SHIPPED | OUTPATIENT
Start: 2021-04-19 | End: 2021-07-13

## 2021-04-19 RX ORDER — QUETIAPINE FUMARATE 100 MG/1
200 TABLET, FILM COATED ORAL
Qty: 180 TABLET | Refills: 0 | Status: SHIPPED | OUTPATIENT
Start: 2021-04-19 | End: 2021-07-12

## 2021-04-22 ENCOUNTER — TELEMEDICINE (OUTPATIENT)
Dept: PSYCHIATRY | Facility: CLINIC | Age: 61
End: 2021-04-22
Payer: COMMERCIAL

## 2021-04-22 DIAGNOSIS — F33.1 MODERATE EPISODE OF RECURRENT MAJOR DEPRESSIVE DISORDER (HCC): ICD-10-CM

## 2021-04-22 DIAGNOSIS — F41.1 GAD (GENERALIZED ANXIETY DISORDER): ICD-10-CM

## 2021-04-22 DIAGNOSIS — G47.00 INSOMNIA, UNSPECIFIED TYPE: Primary | ICD-10-CM

## 2021-04-22 PROCEDURE — 90833 PSYTX W PT W E/M 30 MIN: CPT | Performed by: NURSE PRACTITIONER

## 2021-04-22 PROCEDURE — 99214 OFFICE O/P EST MOD 30 MIN: CPT | Performed by: NURSE PRACTITIONER

## 2021-04-24 NOTE — PSYCH
Virtual Regular Visit    Problem List Items Addressed This Visit        Other    Recurrent major depressive disorder ([address] )    OSITO (generalized anxiety disorder)    Insomnia - Primary        Reason for visit is   Chief Complaint   Patient presents with   [facility] Depression    Medication Management     Encounter provider Kierra Moraes PhD    Provider located at [address] [place] [address]  [address]  [place] [address]  [phone]    Recent Visits  Date Type Provider Dept   04/22/21 Telemedicine Kierra Moraes PhD [facility]   Showing recent visits within past 7 days and meeting all other requirements     Future Appointments  No visits were found meeting these conditions  Showing future appointments within next 150 days and meeting all other requirements        After connecting through [employer], the patient was identified by name and date of birth  Kwan Sharpe was informed that this is a telemedicine visit and that the visit is being conducted through phone and patient was informed that this is not a secure, HIPAA-compliant platform  He agrees to proceed  My office door was closed  No one else was in the room  He acknowledged consent and understanding of privacy and security of the video platform  The patient has agreed to participate and understands they can discontinue the visit at any time  SUBJECTIVE:    Kwan Sharpe is a 56 y o  male with a history of depression and anxiety seen for medication management and mood evaluation  Demond Albarado reports stress from family wanting him to watch their dog during the day  Stress with home life and past winter snow ploughing   Appetite is good, sleeping well  Worries family issues will become worse with dog issues  Takes medication as prescribed   Denies SI     HPI ROS Appetite Changes and Sleep: normal appetite and normal energy level    Review Of Systems:     Mood Anxiety and Depression   Behavior Normal    Thought Content Normal   General Emotional Problems   Personality Normal   Other Psych Symptoms Normal   Constitutional As Noted in HPI   ENT As Noted in HPI   Cardiovascular As Noted in HPI   Respiratory As Noted in HPI   Gastrointestinal As Noted in HPI   Genitourinary As Noted in HPI   Musculoskeletal As Noted in HPI   Integumentary As Noted in HPI   Neurological As Noted in HPI   Endocrine Normal    Other Symptoms Normal        Substance Abuse History:    Social History     Substance and Sexual Activity   Drug Use No       Family Psychiatric History:     Family History   Problem Relation Age of Onset    Suicide Attempts Father     Depression Father     Hyperlipidemia Maternal Aunt     Stroke Maternal Aunt     Cervical cancer Family        Social History     Socioeconomic History    Marital status: /Civil Union     Spouse name: Not on file    Number of children: Not on file    Years of education: Not on file    Highest education level: Not on file   Occupational History    Occupation: retired     Social Needs    Financial resource strain: Not on file    Food insecurity     Worry: Not on file     Inability: Not on file   [employer] needs     Medical: Not on file     Non-medical: Not on file   Tobacco Use    Smoking status: Current Every Day Smoker    Smokeless tobacco: Never Used    Tobacco comment: quit about 1-2 years ago       Substance and Sexual Activity    Alcohol use: Yes     Comment: rarely; social    Drug use: No    Sexual activity: Not Currently   Lifestyle    Physical activity     Days per week: Not on file     Minutes per session: Not on file    Stress: Not on file   Relationships    Social connections     Talks on phone: Not on file     Gets together: Not on file     Attends Mu-ism service: Not on file     Active member of club or organization: Not on file     Attends meetings of clubs or organizations: Not on file     Relationship status: Not on file    Intimate partner violence     Fear of current or ex partner: Not on file     Emotionally abused: Not on file     Physically abused: Not on file     Forced sexual activity: Not on file   Other Topics Concern    Not on file   Social History Narrative    Daily coffee consumption       Past Medical History:   Diagnosis Date    Anxiety     BPH (benign prostatic hyperplasia)     Depressed     Hayfever     Inguinal hernia     last assessed: 1/21/16    Mild depressive disorder ([facility] [address] )     last assessed: 2/24/16    Suicide attempt [facility])        Past Surgical History:   Procedure Laterality Date    HERNIA REPAIR Bilateral     5 months ago   KNEE ARTHROSCOPY Left     Possible ACL repair    KNEE SURGERY      ROTATOR CUFF REPAIR Left     about 1 year ago       Current Outpatient Medications   Medication Sig Dispense Refill    clonazePAM (KlonoPIN) 2 mg tablet Take 1 tablet (2 mg total) by mouth 2 (two) times a day 60 tablet 0    clonazePAM (KlonoPIN) 2 mg tablet Take 1 tablet (2 mg total) by mouth 2 (two) times a day 60 tablet 0    DULoxetine (CYMBALTA) 20 mg capsule TAKE 2 CAPSULES BY MOUTH EVERY DAY 60 capsule 3    ergocalciferol (VITAMIN D2) 50,000 units Take 1 capsule (50,000 Units total) by mouth once a week 12 capsule 0    QUEtiapine (SEROquel) 100 mg tablet Take 2 tablets (200 mg total) by mouth daily at bedtime 180 tablet 0    QUEtiapine (SEROquel) 50 mg tablet Take 1 tablet (50 mg total) by mouth daily after breakfast 90 tablet 0    traZODone (DESYREL) 300 MG tablet Take 1 tablet (300 mg total) by mouth daily at bedtime 90 tablet 0     No current facility-administered medications for this visit  Allergies   Allergen Reactions    Amoxicillin Visual Disturbance     Reaction Date: [id number];     Motrin [Ibuprofen]      Reaction Date: [id number];    Lips swelled    Other      Pt "I think the antibiotic is Avelox"       The following portions of the patient's history were reviewed and updated as appropriate: allergies, current medications, past family history, past medical history, past social history, past surgical history and problem list     OBJECTIVE:     Mental Status Examination:    Appearance phone session   Mood anxious   Affect phone session   Speech a normal rate   Thought Processes normal thought processes   Hallucinations no hallucinations present    Thought Content no delusions   Abnormal Thoughts no suicidal thoughts  and no homicidal thoughts    Orientation  oriented to person and place and time   Remote Memory short term memory intact and long term memory intact   Attention Span concentration intact   Intellect Appears to be of Average Intelligence   Insight Insight intact   Judgement judgment was intact   Muscle Strength denies problems   Language no difficulty naming common objects   Fund of Knowledge displays adequate knowledge of current events   Pain none   Pain Scale 0       Laboratory Results: No results found for this or any previous visit  Assessment/Plan:       Diagnoses and all orders for this visit:    Insomnia, unspecified type    OSITO (generalized anxiety disorder)    Moderate episode of recurrent major depressive disorder ([facility] [address] )          Treatment Recommendations- Risks Benefits      Immediate Medical/Psychiatric/Psychotherapy Treatments and Any Precautions: reviewed medication, continue with treatment plan  Next session evaluate if Lawerance Sachs was tried for depression    Risks, Benefits And Possible Side Effects Of Medications:  {PSYCH RISK, BENEFITS AND POSSIBLE SIDE EFFECTS (Optional):67235    Controlled Medication Discussion: Discussed with patient the risks of sedation, respiratory depression, impairment of ability to drive and potential for abuse and addiction related to treatment with benzodiazepine medications   The patient understands risk of treatment with benzodiazepine medications, agrees to not drive if feels impaired and agrees to take medications as prescribed  and he is aware of safe use and storage of medication     Psychotherapy Provided: 16 min of Supportive therapy  Discussed feelings regarding dog and family conflict   Feelings were normalized and validated      Goals discussed in session:reduce depression and anxiety     Treatment Plan:    Completed and signed during the session: Yes - Treatment Plan done but not signed at time of office visit due to:  Plan reviewed by phone and verbal consent given due to [address] distancing enacted 7/28/2020, updated 10/28/2020, 4/22/2021      León Park, PhD 04/24/21

## 2021-04-24 NOTE — BH TREATMENT PLAN
TREATMENT PLAN (Medication Management Only)   [address]   Name and Date of Birth: Екатерина Lafleur 56 y o  1960   Date of Treatment Plan: July 28, 2020, October 28,2020, April 22, 2021   Diagnosis/Diagnoses:   1  OSITO (generalized anxiety disorder)    2  Insomnia, unspecified type    Strengths/Personal Resources for Self-Care: supportive family, taking medications as prescribed, ability to communicate needs, ability to listen, ability to reason  Area/Areas of need (in own words): anxiety symptoms, depressive symptoms   1  Long Term Goal: improve control of depression  Target Date: 6 months - 10/22/2021   Person/Persons responsible for completion of goal: Raj Chavez and provider   2  Short Term Objective (s) - How will we reach this goal?:   A  Provider new recommended medication/dosage changes and/or continue medication(s): continue current medications as prescribed (change effexor to cymbalta)  B  structure in day  C  adequate rest and sleep  Target Date:6 months - 10/22/2021   Person/Persons Responsible for Completion of Goal: Raj Chavez and provider   Progress Towards Goals: continuing treatment   Treatment Modality: medication management every 1-3 months as needed   Review due 180 days from date of this plan:6 months - 10/22/2021   Expected length of service: ongoing treatment   My Physician/PA/NP and I have developed this plan together and I agree to work on the goals and objectives  I understand the treatment goals that were developed for my treatment

## 2021-05-04 DIAGNOSIS — F41.9 ANXIETY: ICD-10-CM

## 2021-05-04 RX ORDER — CLONAZEPAM 2 MG/1
2 TABLET ORAL 2 TIMES DAILY
Qty: 60 TABLET | Refills: 0 | Status: SHIPPED | OUTPATIENT
Start: 2021-05-04 | End: 2021-06-03 | Stop reason: SDUPTHER

## 2021-05-16 DIAGNOSIS — F41.1 GAD (GENERALIZED ANXIETY DISORDER): ICD-10-CM

## 2021-05-16 RX ORDER — DULOXETIN HYDROCHLORIDE 20 MG/1
CAPSULE, DELAYED RELEASE ORAL
Qty: 180 CAPSULE | Refills: 1 | Status: SHIPPED | OUTPATIENT
Start: 2021-05-16 | End: 2021-11-22 | Stop reason: SDUPTHER

## 2021-06-02 DIAGNOSIS — F41.9 ANXIETY: ICD-10-CM

## 2021-06-03 DIAGNOSIS — F41.9 ANXIETY: ICD-10-CM

## 2021-06-03 RX ORDER — CLONAZEPAM 2 MG/1
2 TABLET ORAL 2 TIMES DAILY
Qty: 60 TABLET | Refills: 0 | OUTPATIENT
Start: 2021-06-03

## 2021-06-03 RX ORDER — CLONAZEPAM 2 MG/1
2 TABLET ORAL 2 TIMES DAILY
Qty: 60 TABLET | Refills: 0 | Status: SHIPPED | OUTPATIENT
Start: 2021-06-03 | End: 2021-06-30 | Stop reason: SDUPTHER

## 2021-06-03 RX ORDER — CLONAZEPAM 1 MG/1
1 TABLET ORAL 2 TIMES DAILY
Qty: 30 TABLET | Refills: 0 | OUTPATIENT
Start: 2021-06-03

## 2021-06-30 DIAGNOSIS — F41.9 ANXIETY: ICD-10-CM

## 2021-06-30 NOTE — TELEPHONE ENCOUNTER
----- Message from [address] [place] [address] sent at 6/30/2021  2:42 PM EDT -----  Regarding: refill  QUEtiapine (SEROquel) 50 mg tablet  clonazePAM (KlonoPIN) 2 mg tablet BID  [address], MIKE  07/21/2021 CAROLINA

## 2021-07-01 RX ORDER — CLONAZEPAM 2 MG/1
2 TABLET ORAL 2 TIMES DAILY
Qty: 60 TABLET | Refills: 0 | Status: SHIPPED | OUTPATIENT
Start: 2021-07-01 | End: 2021-08-02 | Stop reason: SDUPTHER

## 2021-07-01 RX ORDER — QUETIAPINE FUMARATE 50 MG/1
50 TABLET, FILM COATED ORAL
Qty: 90 TABLET | Refills: 0 | Status: SHIPPED | OUTPATIENT
Start: 2021-07-01 | End: 2021-11-01 | Stop reason: SDUPTHER

## 2021-07-12 DIAGNOSIS — F41.1 GAD (GENERALIZED ANXIETY DISORDER): ICD-10-CM

## 2021-07-12 RX ORDER — QUETIAPINE FUMARATE 100 MG/1
200 TABLET, FILM COATED ORAL
Qty: 180 TABLET | Refills: 0 | Status: SHIPPED | OUTPATIENT
Start: 2021-07-12 | End: 2021-09-27 | Stop reason: SDUPTHER

## 2021-07-13 DIAGNOSIS — F41.9 ANXIETY: ICD-10-CM

## 2021-07-13 DIAGNOSIS — G47.00 INSOMNIA, UNSPECIFIED TYPE: ICD-10-CM

## 2021-07-13 RX ORDER — TRAZODONE HYDROCHLORIDE 300 MG/1
TABLET ORAL
Qty: 90 TABLET | Refills: 0 | Status: SHIPPED | OUTPATIENT
Start: 2021-07-13 | End: 2021-10-07

## 2021-07-21 ENCOUNTER — OFFICE VISIT (OUTPATIENT)
Dept: PSYCHIATRY | Facility: CLINIC | Age: 61
End: 2021-07-21
Payer: COMMERCIAL

## 2021-07-21 VITALS
SYSTOLIC BLOOD PRESSURE: 141 MMHG | WEIGHT: 236 LBS | HEART RATE: 80 BPM | HEIGHT: 70 IN | DIASTOLIC BLOOD PRESSURE: 87 MMHG | BODY MASS INDEX: 33.79 KG/M2

## 2021-07-21 DIAGNOSIS — F41.1 GAD (GENERALIZED ANXIETY DISORDER): Primary | ICD-10-CM

## 2021-07-21 DIAGNOSIS — G47.00 INSOMNIA, UNSPECIFIED TYPE: ICD-10-CM

## 2021-07-21 DIAGNOSIS — F33.1 MODERATE EPISODE OF RECURRENT MAJOR DEPRESSIVE DISORDER (HCC): ICD-10-CM

## 2021-07-21 PROCEDURE — 90833 PSYTX W PT W E/M 30 MIN: CPT | Performed by: NURSE PRACTITIONER

## 2021-07-21 PROCEDURE — 99214 OFFICE O/P EST MOD 30 MIN: CPT | Performed by: NURSE PRACTITIONER

## 2021-07-21 NOTE — PSYCH
Subjective:     Patient ID: Kyra Moulton is a 56 y o  male history of anxiety and depression seen for medication management and mood assessment  Asiaradha Higuera reports he is very depressed, lays in bed and cries  The only thing that helps him is taking his wife's baclofen  Then he can enjoy the rest of the day  He reports stress with new dog and how he has to protect his smaller dog  Appetite and sleep are good  He had lab work drawn by PCP  Depression continues, denies SI  Takes medication as prescribed  Family are supporitve  HPI ROS Appetite Changes and Sleep: normal appetite and decreased energy    Review Of Systems:     Mood Anxiety and Depression   Behavior Normal    Thought Content Disturbing Thoughts, Feelings   General Normal    Personality Normal   Other Psych Symptoms Normal   Constitutional As Noted in HPI   ENT As Noted in HPI   Cardiovascular As Noted in HPI   Respiratory As Noted in HPI   Gastrointestinal As Noted in HPI   Genitourinary As Noted in HPI   Musculoskeletal As Noted in HPI   Integumentary As Noted in HPI   Neurological As Noted in HPI   Endocrine Normal    Other Symptoms Normal              Laboratory Results: No results found for this or any previous visit      Substance Abuse History:  Social History     Substance and Sexual Activity   Drug Use No       Family Psychiatric History:   Family History   Problem Relation Age of Onset    Suicide Attempts Father     Depression Father     Hyperlipidemia Maternal Aunt     Stroke Maternal Aunt     Cervical cancer Family        The following portions of the patient's history were reviewed and updated as appropriate: allergies, current medications, past family history, past medical history, past social history, past surgical history and problem list     Social History     Socioeconomic History    Marital status: /Civil Union     Spouse name: Not on file    Number of children: Not on file    Years of education: Not on file    Highest education level: Not on file   Occupational History    Occupation: retired     Tobacco Use    Smoking status: Current Every Day Smoker    Smokeless tobacco: Never Used    Tobacco comment: quit about 1-2 years ago  Substance and Sexual Activity    Alcohol use: Yes     Comment: rarely; social    Drug use: No    Sexual activity: Not Currently   Other Topics Concern    Not on file   Social History Narrative    Daily coffee consumption     Social Determinants of Health     Financial Resource Strain:     Difficulty of Paying Living Expenses:    Food Insecurity:     Worried About Running Out of Food in the Last Year:     Ran Out of Food in the Last Year:    Transportation Needs:     Lack of Transportation (Medical):      Lack of Transportation (Non-Medical):    Physical Activity:     Days of Exercise per Week:     Minutes of Exercise per Session:    Stress:     Feeling of Stress :    Social Connections:     Frequency of Communication with Friends and Family:     Frequency of Social Gatherings with Friends and Family:     Attends Yarsanism Services:     Active Member of Clubs or Organizations:     Attends Club or Organization Meetings:     Marital Status:    Intimate Partner Violence:     Fear of Current or Ex-Partner:     Emotionally Abused:     Physically Abused:     Sexually Abused:      Social History     Social History Narrative    Daily coffee consumption       Objective:       Mental status:  Appearance adequate hygiene and grooming, restless and fidgety and good eye contact    Mood depressed, anxious and mood appropriate   Affect affect was tearful and affect appropriate    Speech a normal rate   Thought Processes normal thought processes   Hallucinations no hallucinations present    Thought Content no delusions   Abnormal Thoughts no suicidal thoughts  and no homicidal thoughts    Orientation  oriented to person and place and time   Remote Memory short term memory intact and long term memory intact   Attention Span concentration intact   Intellect Appears to be of Average Intelligence   Insight Insight intact   Judgement judgment was intact   Muscle Strength Muscle strength and tone were normal   Language no difficulty naming common objects   Fund of Knowledge displays adequate knowledge of current events   Pain none   Pain Scale 0       Assessment/Plan:       Diagnoses and all orders for this visit:    OSITO (generalized anxiety disorder)    Insomnia, unspecified type    Moderate episode of recurrent major depressive disorder ([facility] [address] )            Treatment Recommendations- Risks Benefits      Immediate Medical/Psychiatric/Psychotherapy Treatments and Any Precautions: reviewed medication, Baclofen ordered for anxiety and depression  Risks, Benefits And Possible Side Effects Of Medications:  {PSYCH RISK, BENEFITS AND POSSIBLE SIDE EFFECTS (Optional):17554    Controlled Medication Discussion: Discussed with patient the risks of sedation, respiratory depression, impairment of ability to drive and potential for abuse and addiction related to treatment with benzodiazepine medications  The patient understands risk of treatment with benzodiazepine medications, agrees to not drive if feels impaired and agrees to take medications as prescribed  and he is aware of safe use and storage of medication     Psychotherapy Provided: 18 min Individual psychotherapy provided     Supportive therapy  Medication education  Discussed Esketamine  Coping with depression      Goals discussed in session: reduce depression and anxiety    Treatment plan enacted 7/28/2020, updated 10/28/2020, 7/21/2021

## 2021-07-21 NOTE — BH TREATMENT PLAN
TREATMENT PLAN (Medication Management Only)         [facility]     Name and Date of Birth:  Juan José Navarro NNYIPL 6 y o  1960  Date of Treatment Plan: July 28, 2020, October 28,2020, July 21, 2021  Diagnosis/Diagnoses:    1  OSITO (generalized anxiety disorder)    2  Insomnia, unspecified type       Strengths/Personal Resources for Self-Care: supportive family, taking medications as prescribed, ability to communicate needs, ability to listen, ability to reason  Area/Areas of need (in own words): anxiety symptoms, depressive symptoms  1          Long Term Goal: improve control of depression  Target Date: 6 months - 1/21/2022  Person/Persons responsible for completion of goal: Juan José and provider  2          Short Term Objective (s) - How will we reach this goal?:   A  Provider new recommended medication/dosage changes and/or continue medication(s): continue current medications as prescribed (add baclofen for anxiety and depression)  B  structure in day  C  adequate rest and sleep  Target Date:6 months - 1/21/2022  Person/Persons Responsible for Completion of Goal: Juan José and provider  Progress Towards Goals: continuing treatment  Treatment Modality: medication management every 1-3  months as needed  Review due 180 days from date of this plan:6 months - 1/21/2022  Expected length of service: ongoing treatment  My Physician/PA/NP and I have developed this plan together and I agree to work on the goals and objectives  I understand the treatment goals that were developed for my treatment

## 2021-07-22 DIAGNOSIS — F41.1 GAD (GENERALIZED ANXIETY DISORDER): Primary | ICD-10-CM

## 2021-07-22 RX ORDER — BACLOFEN 10 MG/1
10 TABLET ORAL DAILY
Qty: 30 TABLET | Refills: 3 | Status: SHIPPED | OUTPATIENT
Start: 2021-07-22 | End: 2021-10-21

## 2021-08-02 DIAGNOSIS — F41.9 ANXIETY: ICD-10-CM

## 2021-08-02 RX ORDER — CLONAZEPAM 2 MG/1
2 TABLET ORAL 2 TIMES DAILY
Qty: 60 TABLET | Refills: 0 | Status: SHIPPED | OUTPATIENT
Start: 2021-08-02 | End: 2021-08-30 | Stop reason: SDUPTHER

## 2021-08-30 DIAGNOSIS — F41.9 ANXIETY: ICD-10-CM

## 2021-08-30 RX ORDER — CLONAZEPAM 2 MG/1
2 TABLET ORAL 2 TIMES DAILY
Qty: 60 TABLET | Refills: 0 | Status: SHIPPED | OUTPATIENT
Start: 2021-08-30 | End: 2021-09-27 | Stop reason: SDUPTHER

## 2021-09-27 DIAGNOSIS — F41.1 GAD (GENERALIZED ANXIETY DISORDER): ICD-10-CM

## 2021-09-27 DIAGNOSIS — F41.9 ANXIETY: ICD-10-CM

## 2021-09-27 RX ORDER — CLONAZEPAM 2 MG/1
2 TABLET ORAL 2 TIMES DAILY
Qty: 60 TABLET | Refills: 0 | Status: SHIPPED | OUTPATIENT
Start: 2021-09-27 | End: 2021-10-28 | Stop reason: SDUPTHER

## 2021-09-27 RX ORDER — QUETIAPINE FUMARATE 100 MG/1
200 TABLET, FILM COATED ORAL
Qty: 60 TABLET | Refills: 0 | Status: SHIPPED | OUTPATIENT
Start: 2021-09-27 | End: 2021-10-21

## 2021-10-07 DIAGNOSIS — F41.9 ANXIETY: ICD-10-CM

## 2021-10-07 DIAGNOSIS — G47.00 INSOMNIA, UNSPECIFIED TYPE: ICD-10-CM

## 2021-10-07 RX ORDER — TRAZODONE HYDROCHLORIDE 300 MG/1
TABLET ORAL
Qty: 90 TABLET | Refills: 0 | Status: SHIPPED | OUTPATIENT
Start: 2021-10-07 | End: 2022-01-13 | Stop reason: SDUPTHER

## 2021-10-21 DIAGNOSIS — F41.1 GAD (GENERALIZED ANXIETY DISORDER): ICD-10-CM

## 2021-10-21 RX ORDER — BACLOFEN 10 MG/1
TABLET ORAL
Qty: 30 TABLET | Refills: 3 | Status: SHIPPED | OUTPATIENT
Start: 2021-10-21 | End: 2021-11-01 | Stop reason: SDUPTHER

## 2021-10-21 RX ORDER — QUETIAPINE FUMARATE 100 MG/1
200 TABLET, FILM COATED ORAL
Qty: 60 TABLET | Refills: 0 | Status: SHIPPED | OUTPATIENT
Start: 2021-10-21 | End: 2021-11-01 | Stop reason: SDUPTHER

## 2021-10-28 DIAGNOSIS — F41.9 ANXIETY: ICD-10-CM

## 2021-10-28 RX ORDER — CLONAZEPAM 2 MG/1
2 TABLET ORAL 2 TIMES DAILY
Qty: 60 TABLET | Refills: 0 | Status: SHIPPED | OUTPATIENT
Start: 2021-10-28 | End: 2021-12-02 | Stop reason: SDUPTHER

## 2021-11-01 ENCOUNTER — OFFICE VISIT (OUTPATIENT)
Dept: PSYCHIATRY | Facility: CLINIC | Age: 61
End: 2021-11-01
Payer: COMMERCIAL

## 2021-11-01 VITALS
HEART RATE: 78 BPM | DIASTOLIC BLOOD PRESSURE: 93 MMHG | BODY MASS INDEX: 33.79 KG/M2 | WEIGHT: 236 LBS | HEIGHT: 70 IN | SYSTOLIC BLOOD PRESSURE: 147 MMHG

## 2021-11-01 DIAGNOSIS — F41.1 GAD (GENERALIZED ANXIETY DISORDER): ICD-10-CM

## 2021-11-01 DIAGNOSIS — F41.9 ANXIETY: ICD-10-CM

## 2021-11-01 DIAGNOSIS — F33.1 MODERATE EPISODE OF RECURRENT MAJOR DEPRESSIVE DISORDER (HCC): ICD-10-CM

## 2021-11-01 DIAGNOSIS — G47.00 INSOMNIA, UNSPECIFIED TYPE: Primary | ICD-10-CM

## 2021-11-01 PROCEDURE — 90833 PSYTX W PT W E/M 30 MIN: CPT | Performed by: NURSE PRACTITIONER

## 2021-11-01 PROCEDURE — 99214 OFFICE O/P EST MOD 30 MIN: CPT | Performed by: NURSE PRACTITIONER

## 2021-11-01 RX ORDER — QUETIAPINE FUMARATE 100 MG/1
200 TABLET, FILM COATED ORAL
Qty: 60 TABLET | Refills: 3 | Status: SHIPPED | OUTPATIENT
Start: 2021-11-01 | End: 2022-01-26 | Stop reason: SDUPTHER

## 2021-11-01 RX ORDER — BACLOFEN 10 MG/1
10 TABLET ORAL 2 TIMES DAILY
Qty: 60 TABLET | Refills: 3 | Status: SHIPPED | OUTPATIENT
Start: 2021-11-01 | End: 2022-02-16

## 2021-11-01 RX ORDER — QUETIAPINE FUMARATE 50 MG/1
50 TABLET, FILM COATED ORAL
Qty: 90 TABLET | Refills: 0 | Status: SHIPPED | OUTPATIENT
Start: 2021-11-01 | End: 2022-01-26 | Stop reason: SDUPTHER

## 2021-11-22 DIAGNOSIS — F41.1 GAD (GENERALIZED ANXIETY DISORDER): ICD-10-CM

## 2021-11-22 RX ORDER — DULOXETIN HYDROCHLORIDE 20 MG/1
40 CAPSULE, DELAYED RELEASE ORAL DAILY
Qty: 180 CAPSULE | Refills: 1 | Status: SHIPPED | OUTPATIENT
Start: 2021-11-22 | End: 2022-05-16 | Stop reason: SDUPTHER

## 2021-12-02 DIAGNOSIS — F41.9 ANXIETY: ICD-10-CM

## 2021-12-03 RX ORDER — CLONAZEPAM 2 MG/1
2 TABLET ORAL 2 TIMES DAILY
Qty: 60 TABLET | Refills: 0 | Status: SHIPPED | OUTPATIENT
Start: 2021-12-03 | End: 2022-01-03 | Stop reason: SDUPTHER

## 2022-01-03 DIAGNOSIS — F41.9 ANXIETY: ICD-10-CM

## 2022-01-03 RX ORDER — CLONAZEPAM 2 MG/1
2 TABLET ORAL 2 TIMES DAILY
Qty: 60 TABLET | Refills: 0 | Status: SHIPPED | OUTPATIENT
Start: 2022-01-03 | End: 2022-01-28 | Stop reason: SDUPTHER

## 2022-01-13 DIAGNOSIS — F41.9 ANXIETY: ICD-10-CM

## 2022-01-13 DIAGNOSIS — G47.00 INSOMNIA, UNSPECIFIED TYPE: ICD-10-CM

## 2022-01-13 RX ORDER — TRAZODONE HYDROCHLORIDE 300 MG/1
300 TABLET ORAL
Qty: 90 TABLET | Refills: 0 | Status: SHIPPED | OUTPATIENT
Start: 2022-01-13 | End: 2022-04-22

## 2022-01-26 DIAGNOSIS — F41.9 ANXIETY: ICD-10-CM

## 2022-01-26 DIAGNOSIS — F41.1 GAD (GENERALIZED ANXIETY DISORDER): ICD-10-CM

## 2022-01-26 RX ORDER — QUETIAPINE FUMARATE 50 MG/1
50 TABLET, FILM COATED ORAL
Qty: 90 TABLET | Refills: 0 | Status: SHIPPED | OUTPATIENT
Start: 2022-01-26 | End: 2022-04-19

## 2022-01-26 RX ORDER — QUETIAPINE FUMARATE 100 MG/1
200 TABLET, FILM COATED ORAL
Qty: 60 TABLET | Refills: 3 | Status: SHIPPED | OUTPATIENT
Start: 2022-01-26 | End: 2022-02-16 | Stop reason: SDUPTHER

## 2022-01-26 NOTE — TELEPHONE ENCOUNTER
----- Message from [address] [place] [address] sent at 1/26/2022 12:55 PM EST -----  Regarding: refill    QUEtiapine (SEROquel) 50 mg tablet  QUEtiapine (SEROquel) 100 mg tablet  [facility]/pharmacy #[id number]- [place] [facility] - [address] [place] [address]    2/23/2022 CAROLINA

## 2022-01-28 ENCOUNTER — TELEPHONE (OUTPATIENT)
Dept: PSYCHIATRY | Facility: CLINIC | Age: 62
End: 2022-01-28

## 2022-01-28 NOTE — TELEPHONE ENCOUNTER
Dr Mariaelena Ku pt requesting a refill on his  clonazePAM (KlonoPIN) 2 mg tablet Take 1 tablet (2 mg total) by mouth 2 (two) times a day,  His pharmacy is [facility]/pharmacy #[id number]- [place] - [address] [place] [address]  Next appt is 2/23/22 with Dr Mariaelena Ku

## 2022-02-16 DIAGNOSIS — F41.1 GAD (GENERALIZED ANXIETY DISORDER): ICD-10-CM

## 2022-02-16 RX ORDER — BACLOFEN 10 MG/1
10 TABLET ORAL 2 TIMES DAILY
Qty: 180 TABLET | Refills: 0 | Status: SHIPPED | OUTPATIENT
Start: 2022-02-16 | End: 2022-04-22

## 2022-02-16 RX ORDER — QUETIAPINE FUMARATE 100 MG/1
200 TABLET, FILM COATED ORAL
Qty: 60 TABLET | Refills: 3 | Status: SHIPPED | OUTPATIENT
Start: 2022-02-16 | End: 2022-07-20

## 2022-02-28 DIAGNOSIS — F41.9 ANXIETY: ICD-10-CM

## 2022-02-28 RX ORDER — CLONAZEPAM 2 MG/1
2 TABLET ORAL 2 TIMES DAILY
Qty: 60 TABLET | Refills: 0 | Status: SHIPPED | OUTPATIENT
Start: 2022-02-28 | End: 2022-03-29 | Stop reason: SDUPTHER

## 2022-03-18 ENCOUNTER — TELEPHONE (OUTPATIENT)
Dept: FAMILY MEDICINE CLINIC | Facility: CLINIC | Age: 62
End: 2022-03-18

## 2022-03-18 NOTE — TELEPHONE ENCOUNTER
Patient not seen since 2019  Please verify pcp  He will need a CPE   [address], [place]

## 2022-03-29 DIAGNOSIS — F41.9 ANXIETY: ICD-10-CM

## 2022-03-29 RX ORDER — CLONAZEPAM 2 MG/1
2 TABLET ORAL 2 TIMES DAILY
Qty: 60 TABLET | Refills: 0 | Status: SHIPPED | OUTPATIENT
Start: 2022-03-29 | End: 2022-04-25 | Stop reason: SDUPTHER

## 2022-04-19 DIAGNOSIS — F41.9 ANXIETY: ICD-10-CM

## 2022-04-19 RX ORDER — QUETIAPINE FUMARATE 50 MG/1
50 TABLET, FILM COATED ORAL
Qty: 90 TABLET | Refills: 0 | Status: SHIPPED | OUTPATIENT
Start: 2022-04-19 | End: 2022-07-21

## 2022-04-22 DIAGNOSIS — F41.1 GAD (GENERALIZED ANXIETY DISORDER): ICD-10-CM

## 2022-04-22 DIAGNOSIS — F41.9 ANXIETY: ICD-10-CM

## 2022-04-22 DIAGNOSIS — G47.00 INSOMNIA, UNSPECIFIED TYPE: ICD-10-CM

## 2022-04-22 RX ORDER — BACLOFEN 10 MG/1
TABLET ORAL
Qty: 180 TABLET | Refills: 0 | Status: SHIPPED | OUTPATIENT
Start: 2022-04-22 | End: 2022-07-21

## 2022-04-22 RX ORDER — TRAZODONE HYDROCHLORIDE 300 MG/1
TABLET ORAL
Qty: 90 TABLET | Refills: 0 | Status: SHIPPED | OUTPATIENT
Start: 2022-04-22 | End: 2022-07-21

## 2022-04-25 DIAGNOSIS — F41.9 ANXIETY: ICD-10-CM

## 2022-04-25 RX ORDER — CLONAZEPAM 2 MG/1
2 TABLET ORAL 2 TIMES DAILY
Qty: 60 TABLET | Refills: 0 | Status: SHIPPED | OUTPATIENT
Start: 2022-04-25 | End: 2022-05-29

## 2022-04-25 NOTE — TELEPHONE ENCOUNTER
----- Message from [address] [place] [address] sent at 4/25/2022  1:11 PM EDT -----  Regarding: refill  clonazePAM (KlonoPIN) 2 mg tablet Take 1 tablet (2 mg total) by mouth 2 (two) times a day,    [facility]/pharmacy #[id number]- [place] [facility] - [address]    Next appt 5/18/22 CAROLINA

## 2022-05-16 ENCOUNTER — TELEPHONE (OUTPATIENT)
Dept: PSYCHIATRY | Facility: CLINIC | Age: 62
End: 2022-05-16

## 2022-05-16 DIAGNOSIS — F41.1 GAD (GENERALIZED ANXIETY DISORDER): ICD-10-CM

## 2022-05-16 RX ORDER — DULOXETIN HYDROCHLORIDE 20 MG/1
60 CAPSULE, DELAYED RELEASE ORAL DAILY
Qty: 270 CAPSULE | Refills: 1 | Status: SHIPPED | OUTPATIENT
Start: 2022-05-16

## 2022-05-16 NOTE — TELEPHONE ENCOUNTER
I received an incoming telephone call from patient wanting to inform Stefany Jones PhD that he is feeling depressed and he thinks the DULoxetine (CYMBALTA) 20 mg capsule is not working  He also needs a refill on this medication but doesn't want a refill on this if medication is going to be changed  Please advise patient and myself for further instructions

## 2022-05-16 NOTE — TELEPHONE ENCOUNTER
I placed an outgoing call to patient per request of Lydia Parnell PhD to inform him that she has increased his duloxetine (cymbalta) 20 mg capsule to 3 capsules (total of 60 mg) per day in the morning and it was called to his preferred pharmacy [address]

## 2022-05-26 ENCOUNTER — OFFICE VISIT (OUTPATIENT)
Dept: FAMILY MEDICINE CLINIC | Facility: CLINIC | Age: 62
End: 2022-05-26
Payer: COMMERCIAL

## 2022-05-26 VITALS
BODY MASS INDEX: 31.95 KG/M2 | HEART RATE: 70 BPM | HEIGHT: 70 IN | WEIGHT: 223.2 LBS | OXYGEN SATURATION: 97 % | RESPIRATION RATE: 18 BRPM | TEMPERATURE: 97.7 F | DIASTOLIC BLOOD PRESSURE: 72 MMHG | SYSTOLIC BLOOD PRESSURE: 122 MMHG

## 2022-05-26 DIAGNOSIS — J01.90 ACUTE SINUSITIS, RECURRENCE NOT SPECIFIED, UNSPECIFIED LOCATION: Primary | ICD-10-CM

## 2022-05-26 PROCEDURE — 1036F TOBACCO NON-USER: CPT | Performed by: FAMILY MEDICINE

## 2022-05-26 PROCEDURE — 99213 OFFICE O/P EST LOW 20 MIN: CPT | Performed by: FAMILY MEDICINE

## 2022-05-26 PROCEDURE — 3725F SCREEN DEPRESSION PERFORMED: CPT | Performed by: FAMILY MEDICINE

## 2022-05-26 PROCEDURE — 3008F BODY MASS INDEX DOCD: CPT | Performed by: FAMILY MEDICINE

## 2022-05-26 RX ORDER — AZITHROMYCIN 250 MG/1
TABLET, FILM COATED ORAL
Qty: 6 TABLET | Refills: 0 | Status: SHIPPED | OUTPATIENT
Start: 2022-05-26 | End: 2022-05-31

## 2022-05-26 NOTE — PROGRESS NOTES
Assessment/Plan:       Diagnoses and all orders for this visit:    Acute sinusitis, recurrence not specified, unspecified location  -     azithromycin (Zithromax) 250 mg tablet; Take 2 tablets (500 mg total) by mouth daily for 1 day, THEN 1 tablet (250 mg total) daily for 4 days  - Continue Mucinex and Flonase  - Took 2 COVID tests which were negative  - Counseled on supportive care  Subjective:      Patient ID: Cristina Poe is a 59 y o  male  Sinusitis  This is a new problem  The current episode started in the past 7 days  The problem has been gradually worsening since onset  There has been no fever  Associated symptoms include congestion, coughing, shortness of breath and sinus pressure  Pertinent negatives include no chills, diaphoresis, ear pain, headaches, hoarse voice, neck pain, sneezing, sore throat or swollen glands  Treatments tried: Mucinex, Flonase  The treatment provided mild relief  The following portions of the patient's history were reviewed and updated as appropriate: allergies, current medications, past family history, past medical history, past social history, past surgical history and problem list     Review of Systems   Constitutional: Negative for chills and diaphoresis  HENT: Positive for congestion and sinus pressure  Negative for ear pain, hoarse voice, sneezing and sore throat  Respiratory: Positive for cough and shortness of breath  Musculoskeletal: Negative for neck pain  Neurological: Negative for headaches  Objective:      /72   Pulse 70   Temp 97 7 °F (36 5 °C)   Resp 18   Ht 5' 10" (1 778 m)   Wt 101 kg (223 lb 3 2 oz)   SpO2 97%   BMI 32 03 kg/m²          Physical Exam  Constitutional:       General: He is not in acute distress  Appearance: He is well-developed  He is not diaphoretic  HENT:      Head: Normocephalic and atraumatic  Right Ear: Tympanic membrane and ear canal normal  No drainage, swelling or tenderness   No middle ear effusion  Tympanic membrane is not erythematous  Left Ear: Tympanic membrane and ear canal normal  No drainage, swelling or tenderness  No middle ear effusion  Tympanic membrane is not erythematous  Nose: No congestion or rhinorrhea  Mouth/Throat:      Mouth: Mucous membranes are moist  No oral lesions  Pharynx: No pharyngeal swelling, oropharyngeal exudate, posterior oropharyngeal erythema or uvula swelling  Eyes:      General: No scleral icterus  Right eye: No discharge  Left eye: No discharge  Conjunctiva/sclera: Conjunctivae normal    Cardiovascular:      Rate and Rhythm: Normal rate and regular rhythm  Pulses: Normal pulses  Pulmonary:      Effort: Pulmonary effort is normal  No respiratory distress  Breath sounds: Normal breath sounds  No stridor  No wheezing, rhonchi or rales  Chest:      Chest wall: No tenderness  Musculoskeletal:         General: Normal range of motion  Cervical back: Normal range of motion  Skin:     General: Skin is warm  Neurological:      Mental Status: He is alert and oriented to person, place, and time  Psychiatric:         Mood and Affect: Mood normal          Behavior: Behavior normal          Thought Content:  Thought content normal          Judgment: Judgment normal

## 2022-05-28 DIAGNOSIS — F41.9 ANXIETY: ICD-10-CM

## 2022-05-29 RX ORDER — CLONAZEPAM 2 MG/1
TABLET ORAL
Qty: 60 TABLET | Refills: 0 | Status: SHIPPED | OUTPATIENT
Start: 2022-05-29 | End: 2022-06-27 | Stop reason: SDUPTHER

## 2022-06-27 DIAGNOSIS — F41.9 ANXIETY: ICD-10-CM

## 2022-06-27 RX ORDER — CLONAZEPAM 2 MG/1
2 TABLET ORAL 2 TIMES DAILY
Qty: 60 TABLET | Refills: 0 | Status: SHIPPED | OUTPATIENT
Start: 2022-06-27 | End: 2022-07-27 | Stop reason: SDUPTHER

## 2022-07-05 ENCOUNTER — TELEPHONE (OUTPATIENT)
Dept: BEHAVIORAL/MENTAL HEALTH CLINIC | Facility: CLINIC | Age: 62
End: 2022-07-05

## 2022-07-20 DIAGNOSIS — F41.1 GAD (GENERALIZED ANXIETY DISORDER): ICD-10-CM

## 2022-07-20 RX ORDER — QUETIAPINE FUMARATE 100 MG/1
200 TABLET, FILM COATED ORAL
Qty: 180 TABLET | Refills: 1 | Status: SHIPPED | OUTPATIENT
Start: 2022-07-20

## 2022-07-21 DIAGNOSIS — G47.00 INSOMNIA, UNSPECIFIED TYPE: ICD-10-CM

## 2022-07-21 DIAGNOSIS — F41.1 GAD (GENERALIZED ANXIETY DISORDER): ICD-10-CM

## 2022-07-21 DIAGNOSIS — F41.9 ANXIETY: ICD-10-CM

## 2022-07-21 RX ORDER — QUETIAPINE FUMARATE 50 MG/1
50 TABLET, FILM COATED ORAL
Qty: 90 TABLET | Refills: 0 | Status: SHIPPED | OUTPATIENT
Start: 2022-07-21

## 2022-07-21 RX ORDER — TRAZODONE HYDROCHLORIDE 300 MG/1
TABLET ORAL
Qty: 90 TABLET | Refills: 0 | Status: SHIPPED | OUTPATIENT
Start: 2022-07-21

## 2022-07-21 RX ORDER — BACLOFEN 10 MG/1
TABLET ORAL
Qty: 180 TABLET | Refills: 0 | Status: SHIPPED | OUTPATIENT
Start: 2022-07-21 | End: 2022-09-27 | Stop reason: SDUPTHER

## 2022-07-26 ENCOUNTER — OFFICE VISIT (OUTPATIENT)
Dept: FAMILY MEDICINE CLINIC | Facility: CLINIC | Age: 62
End: 2022-07-26
Payer: COMMERCIAL

## 2022-07-26 VITALS
HEIGHT: 70 IN | HEART RATE: 76 BPM | DIASTOLIC BLOOD PRESSURE: 80 MMHG | TEMPERATURE: 97.4 F | SYSTOLIC BLOOD PRESSURE: 136 MMHG | OXYGEN SATURATION: 96 % | WEIGHT: 217 LBS | BODY MASS INDEX: 31.07 KG/M2 | RESPIRATION RATE: 16 BRPM

## 2022-07-26 DIAGNOSIS — H10.33 ACUTE BACTERIAL CONJUNCTIVITIS OF BOTH EYES: Primary | ICD-10-CM

## 2022-07-26 PROCEDURE — 99213 OFFICE O/P EST LOW 20 MIN: CPT | Performed by: FAMILY MEDICINE

## 2022-07-26 RX ORDER — TOBRAMYCIN AND DEXAMETHASONE 3; 1 MG/ML; MG/ML
1 SUSPENSION/ DROPS OPHTHALMIC
Qty: 10 ML | Refills: 0 | Status: SHIPPED | OUTPATIENT
Start: 2022-07-26

## 2022-07-26 NOTE — PROGRESS NOTES
Assessment/Plan:    1  Acute bacterial conjunctivitis of both eyes  -     tobramycin-dexamethasone (TOBRADEX) ophthalmic suspension; Administer 1 drop to both eyes every 4 (four) hours while awake          There are no Patient Instructions on file for this visit  Return for Next scheduled follow up  Subjective:      Patient ID: Lesia Jacinto is a 59 y o  male  Chief Complaint   Patient presents with    possible conjunctivitis      For the past 2 days, both eyes   Zo Padilla MA        Pt states he has discharge from the eye  Started on left eye tearing and gook 3 days ago  Rt eye started yesterday      The following portions of the patient's history were reviewed and updated as appropriate: allergies, current medications, past family history, past medical history, past social history, past surgical history and problem list     Review of Systems   HENT: Positive for ear discharge  Current Outpatient Medications   Medication Sig Dispense Refill    baclofen 10 mg tablet TAKE 1 TABLET BY MOUTH TWICE A  tablet 0    clonazePAM (KlonoPIN) 2 mg tablet Take 1 tablet (2 mg total) by mouth 2 (two) times a day 60 tablet 0    DULoxetine (CYMBALTA) 20 mg capsule Take 3 capsules (60 mg total) by mouth in the morning   270 capsule 1    ergocalciferol (VITAMIN D2) 50,000 units Take 1 capsule (50,000 Units total) by mouth once a week 12 capsule 0    QUEtiapine (SEROquel) 100 mg tablet TAKE 2 TABLETS (200 MG TOTAL) BY MOUTH DAILY AT BEDTIME 180 tablet 1    QUEtiapine (SEROquel) 50 mg tablet TAKE 1 TABLET (50 MG TOTAL) BY MOUTH DAILY AFTER BREAKFAST 90 tablet 0    tobramycin-dexamethasone (TOBRADEX) ophthalmic suspension Administer 1 drop to both eyes every 4 (four) hours while awake 10 mL 0    traZODone (DESYREL) 300 MG tablet TAKE 1 TABLET BY MOUTH EVERYDAY AT BEDTIME 90 tablet 0    QUEtiapine (SEROquel) 100 mg tablet TAKE 2 TABLETS (200 MG TOTAL) BY MOUTH DAILY AT BEDTIME 180 tablet 1     No current facility-administered medications for this visit  Objective:    /80   Pulse 76   Temp (!) 97 4 °F (36 3 °C)   Resp 16   Ht 5' 10" (1 778 m)   Wt 98 4 kg (217 lb)   SpO2 96%   BMI 31 14 kg/m²        Physical Exam  Eyes:      General:         Right eye: Discharge present  Left eye: Discharge present                 Durwood Cast, DO

## 2022-07-27 DIAGNOSIS — F41.9 ANXIETY: ICD-10-CM

## 2022-07-27 RX ORDER — CLONAZEPAM 2 MG/1
2 TABLET ORAL 2 TIMES DAILY
Qty: 60 TABLET | Refills: 0 | Status: SHIPPED | OUTPATIENT
Start: 2022-07-27 | End: 2022-08-26 | Stop reason: SDUPTHER

## 2022-07-27 NOTE — TELEPHONE ENCOUNTER
----- Message from Gisele Bay sent at 7/27/2022 12:08 PM EDT -----  Regarding: Refill Request      clonazePAM (KlonoPIN) 2 mg tablet        [facility]/pharmacy #[id number]- [place] - [address]   Phone:  [phone]  Fax:  [phone]      Next Appt 8/18/22 Dr Lance Grove  Last Seen 11/1/2021

## 2022-08-18 ENCOUNTER — TELEPHONE (OUTPATIENT)
Dept: PSYCHIATRY | Facility: CLINIC | Age: 62
End: 2022-08-18

## 2022-08-18 NOTE — TELEPHONE ENCOUNTER
Patient called to reschedule his appointment for today with Dr Vita Amaya  due to having pink eye in both eyes

## 2022-08-26 DIAGNOSIS — F41.9 ANXIETY: ICD-10-CM

## 2022-08-26 RX ORDER — CLONAZEPAM 2 MG/1
2 TABLET ORAL 2 TIMES DAILY
Qty: 60 TABLET | Refills: 0 | Status: SHIPPED | OUTPATIENT
Start: 2022-08-26 | End: 2022-09-22 | Stop reason: SDUPTHER

## 2022-08-29 NOTE — TELEPHONE ENCOUNTER
Pt lmm over the weekend stating the pharmacy is out of the clonopin  I called pt this morning 8/29/2022 and asked if Rola Schneider needed us to send a script to another pharmacy  They said  The pharmacy will have it in today  I informed pt if there is any problem please call the office

## 2022-09-22 DIAGNOSIS — F41.9 ANXIETY: ICD-10-CM

## 2022-09-23 RX ORDER — CLONAZEPAM 2 MG/1
2 TABLET ORAL 2 TIMES DAILY
Qty: 60 TABLET | Refills: 0 | Status: SHIPPED | OUTPATIENT
Start: 2022-09-23 | End: 2022-10-21 | Stop reason: SDUPTHER

## 2022-09-27 ENCOUNTER — OFFICE VISIT (OUTPATIENT)
Dept: PSYCHIATRY | Facility: CLINIC | Age: 62
End: 2022-09-27
Payer: COMMERCIAL

## 2022-09-27 VITALS
DIASTOLIC BLOOD PRESSURE: 98 MMHG | HEIGHT: 70 IN | HEART RATE: 72 BPM | WEIGHT: 216 LBS | SYSTOLIC BLOOD PRESSURE: 116 MMHG | BODY MASS INDEX: 30.92 KG/M2

## 2022-09-27 DIAGNOSIS — G47.00 INSOMNIA, UNSPECIFIED TYPE: ICD-10-CM

## 2022-09-27 DIAGNOSIS — R00.2 PALPITATIONS: ICD-10-CM

## 2022-09-27 DIAGNOSIS — F41.1 GAD (GENERALIZED ANXIETY DISORDER): Primary | ICD-10-CM

## 2022-09-27 DIAGNOSIS — Z79.899 HIGH RISK MEDICATION USE: ICD-10-CM

## 2022-09-27 DIAGNOSIS — F33.1 MODERATE EPISODE OF RECURRENT MAJOR DEPRESSIVE DISORDER (HCC): ICD-10-CM

## 2022-09-27 PROCEDURE — 99214 OFFICE O/P EST MOD 30 MIN: CPT | Performed by: NURSE PRACTITIONER

## 2022-09-27 PROCEDURE — 90836 PSYTX W PT W E/M 45 MIN: CPT | Performed by: NURSE PRACTITIONER

## 2022-09-27 RX ORDER — BACLOFEN 10 MG/1
10 TABLET ORAL 2 TIMES DAILY
Qty: 180 TABLET | Refills: 0 | Status: SHIPPED | OUTPATIENT
Start: 2022-09-27

## 2022-09-27 RX ORDER — DULOXETIN HYDROCHLORIDE 20 MG/1
40 CAPSULE, DELAYED RELEASE ORAL 2 TIMES DAILY
Qty: 120 CAPSULE | Refills: 3 | Status: SHIPPED | OUTPATIENT
Start: 2022-09-27 | End: 2022-10-24

## 2022-09-27 NOTE — PSYCH
Subjective:     Patient ID: Blas Rogers is a 57 y o  male history of OSITO, social anxiety, depression seen for medication management and mood assessment  Horatio Osler reports his anxiety and depression have increased the last two months  More tearful, does not want to get out of bed  He avoids going out of home  Reports he is unaware of a trigger for this anxiety and depression  He has been struggling with a hacker on his computer and can not get rid of it  Reports appetite is poor, eats one meal a day and sleep too much  Denies SI, wife is supportive and he takes his medication as prescribed  HPI ROS Appetite Changes and Sleep: decreased appetite and decreased energy    Review Of Systems:     Mood Anxiety, Depression and Emotional Lability   Behavior depressed, lacks motivation   Thought Content Disturbing Thoughts, Feelings when he thinks he will not get better   General Relationship Problems and Emotional Problems   Personality Normal   Other Psych Symptoms Normal   Constitutional As Noted in HPI   ENT As Noted in HPI   Cardiovascular As Noted in HPI   Respiratory As Noted in HPI   Gastrointestinal As Noted in HPI   Genitourinary As Noted in HPI   Musculoskeletal As Noted in HPI   Integumentary As Noted in HPI   Neurological As Noted in HPI   Endocrine Normal    Other Symptoms Normal              Laboratory Results: No results found for this or any previous visit      Substance Abuse History:  Social History     Substance and Sexual Activity   Drug Use No       Family Psychiatric History:   Family History   Problem Relation Age of Onset    Suicide Attempts Father     Depression Father     Hyperlipidemia Maternal Aunt     Stroke Maternal Aunt     Cervical cancer Family        The following portions of the patient's history were reviewed and updated as appropriate: allergies, current medications, past family history, past medical history, past social history, past surgical history and problem list     Social History     Socioeconomic History    Marital status: /Civil Union     Spouse name: Not on file    Number of children: Not on file    Years of education: Not on file    Highest education level: Not on file   Occupational History    Occupation: retired     Tobacco Use    Smoking status: Former Smoker    Smokeless tobacco: Never Used    Tobacco comment: quit about 1-2 years ago       Substance and Sexual Activity    Alcohol use: Yes     Comment: rarely; social    Drug use: No    Sexual activity: Not Currently   Other Topics Concern    Not on file   Social History Narrative    Daily coffee consumption     Social Determinants of Health     Financial Resource Strain: Not on file   Food Insecurity: Not on file   Transportation Needs: Not on file   Physical Activity: Not on file   Stress: Not on file   Social Connections: Not on file   Intimate Partner Violence: Not on file   Housing Stability: Not on file     Social History     Social History Narrative    Daily coffee consumption       Objective:       Mental status:  Appearance calm and cooperative , adequate hygiene and grooming and good eye contact    Mood depressed and anxious   Affect affect appropriate    Speech a normal rate   Thought Processes normal thought processes   Hallucinations no hallucinations present    Thought Content no delusions   Abnormal Thoughts no suicidal thoughts  and no homicidal thoughts    Orientation  oriented to person and place and time   Remote Memory short term memory intact and long term memory intact   Attention Span concentration intact   Intellect Appears to be of Average Intelligence   Insight Insight intact   Judgement judgment was intact   Muscle Strength Muscle strength and tone were normal   Language no difficulty naming common objects   Fund of Knowledge displays adequate knowledge of current events   Pain none   Pain Scale 0       Assessment/Plan:       Diagnoses and all orders for this visit:    OSITO (generalized anxiety disorder)  -     DULoxetine (CYMBALTA) 20 mg capsule; Take 2 capsules (40 mg total) by mouth 2 (two) times a day  -     Vitamin B12; Future  -     Ambulatory referral to [address] [place] [address]; Future    Insomnia, unspecified type  -     Ambulatory referral to [address] [place] [address]; Future    Moderate episode of recurrent major depressive disorder (HCC)  -     DULoxetine (CYMBALTA) 20 mg capsule; Take 2 capsules (40 mg total) by mouth 2 (two) times a day  -     Vitamin D 25 hydroxy; Future  -     Ambulatory referral to [address] [place] [address]; Future    High risk medication use  -     CBC and differential; Future  -     Comprehensive metabolic panel; Future  -     Lipid Panel with Direct LDL reflex; Future  -     TSH, 3rd generation with Free T4 reflex; Future            Treatment Recommendations- Risks Benefits      Immediate Medical/Psychiatric/Psychotherapy Treatments and Any Precautions:  reviewed medication continue with treatment plan, increase cymbalta to 80 mg daily (40 mg BID), therapy referral    Risks, Benefits And Possible Side Effects Of Medications:  {PSYCH RISK, BENEFITS AND POSSIBLE SIDE EFFECTS (Optional):92243    Controlled Medication Discussion: he is aware of safe use and storage of medication     Psychotherapy Provided: 45 min Individual psychotherapy provided     Supportive therapy  Medication education  Mood assessment  Referral to therapist  Coping skills  Lab test ordered    Goals discussed in session: reduce anxiety and depression    Treatment plan due this session enacted September 27, 2022

## 2022-09-27 NOTE — PATIENT INSTRUCTIONS
Increase cymbalta to 80 mg daily  Lab work  Obtain a therapist  Continue medications  Call with problems or concerns

## 2022-09-27 NOTE — BH TREATMENT PLAN
TREATMENT PLAN (Medication Management Only)        [facility]    Name and Date of Birth:  Meenu Lancaster 57 y o  1960  Date of Treatment Plan: September 27, 2022  Diagnosis/Diagnoses:    1  OSITO (generalized anxiety disorder)    2  Insomnia, unspecified type    3  Moderate episode of recurrent major depressive disorder [facility])      Strengths/Personal Resources for Self-Care: supportive family, supportive friends, taking medications as prescribed, ability to communicate needs  Area/Areas of need (in own words): anxiety, depression, mood instability, sleep problems  1  Long Term Goal: improve mood stability and sleep  Target Date:6 months - 3/27/2023  Person/Persons responsible for completion of goal: Perez Correa, provider, family  2  Short Term Objective (s) - How will we reach this goal?:   A  Provider new recommended medication/dosage changes and/or continue medication(s): continue current medications as prescribed  B  structure  C  self care, therapy  Target Date:6 months - 3/27/2023  Person/Persons Responsible for Completion of Goal: Perez Correa, provider, family  Progress Towards Goals: continuing treatment  Treatment Modality: medication management every 3 months  Review due 180 days from date of this plan: 6 months - 3/27/2023  Expected length of service: maintenance  My Physician/PA/NP and I have developed this plan together and I agree to work on the goals and objectives  I understand the treatment goals that were developed for my treatment

## 2022-09-29 ENCOUNTER — TELEPHONE (OUTPATIENT)
Dept: PSYCHIATRY | Facility: CLINIC | Age: 62
End: 2022-09-29

## 2022-10-07 ENCOUNTER — TELEPHONE (OUTPATIENT)
Dept: PSYCHIATRY | Facility: CLINIC | Age: 62
End: 2022-10-07

## 2022-10-14 ENCOUNTER — TELEPHONE (OUTPATIENT)
Dept: PSYCHIATRY | Facility: CLINIC | Age: 62
End: 2022-10-14

## 2022-10-21 DIAGNOSIS — F41.9 ANXIETY: ICD-10-CM

## 2022-10-21 NOTE — TELEPHONE ENCOUNTER
Medication Refill Request     Name of Medication Clonazepam 2 mg  Dose/Frequency 2bid   Quantity 60  Verified pharmacy   [x]  Verified ordering Provider   [x]  Does patient have enough for the next 3 days? Yes [] No [x]  Does patient have a follow-up appointment scheduled?  Yes [x] No []   If so when is appointment: 11/22/2022

## 2022-10-22 RX ORDER — CLONAZEPAM 2 MG/1
2 TABLET ORAL 2 TIMES DAILY
Qty: 60 TABLET | Refills: 0 | Status: SHIPPED | OUTPATIENT
Start: 2022-10-22

## 2022-10-23 DIAGNOSIS — F33.1 MODERATE EPISODE OF RECURRENT MAJOR DEPRESSIVE DISORDER (HCC): ICD-10-CM

## 2022-10-23 DIAGNOSIS — F41.1 GAD (GENERALIZED ANXIETY DISORDER): ICD-10-CM

## 2022-10-24 RX ORDER — DULOXETIN HYDROCHLORIDE 20 MG/1
CAPSULE, DELAYED RELEASE ORAL
Qty: 360 CAPSULE | Refills: 2 | Status: SHIPPED | OUTPATIENT
Start: 2022-10-24

## 2022-11-17 DIAGNOSIS — G47.00 INSOMNIA, UNSPECIFIED TYPE: ICD-10-CM

## 2022-11-17 DIAGNOSIS — F41.9 ANXIETY: ICD-10-CM

## 2022-11-17 RX ORDER — CLONAZEPAM 2 MG/1
2 TABLET ORAL 2 TIMES DAILY
Qty: 60 TABLET | Refills: 0 | Status: SHIPPED | OUTPATIENT
Start: 2022-11-17

## 2022-11-17 RX ORDER — TRAZODONE HYDROCHLORIDE 300 MG/1
300 TABLET ORAL
Qty: 90 TABLET | Refills: 0 | Status: SHIPPED | OUTPATIENT
Start: 2022-11-17

## 2022-11-22 ENCOUNTER — OFFICE VISIT (OUTPATIENT)
Dept: PSYCHIATRY | Facility: CLINIC | Age: 62
End: 2022-11-22

## 2022-11-22 VITALS
WEIGHT: 223.6 LBS | HEART RATE: 86 BPM | BODY MASS INDEX: 32.01 KG/M2 | HEIGHT: 70 IN | DIASTOLIC BLOOD PRESSURE: 99 MMHG | SYSTOLIC BLOOD PRESSURE: 150 MMHG

## 2022-11-22 DIAGNOSIS — F33.1 MODERATE EPISODE OF RECURRENT MAJOR DEPRESSIVE DISORDER (HCC): Primary | ICD-10-CM

## 2022-11-22 DIAGNOSIS — F41.1 GAD (GENERALIZED ANXIETY DISORDER): ICD-10-CM

## 2022-11-22 NOTE — PATIENT INSTRUCTIONS
Wean off cymbalta take 20 mg in am and 20 mg pm for a week  Then 20 mg in am for a week  Continue medications  Call with problems or concerns   Vitamin D supplement 2,000 IU

## 2022-11-28 NOTE — PSYCH
Subjective:     Patient ID: Jeffy Hidalgo is a 57 y o  male complained of depression, anxiety and feeling tired "all the time" seen for medication management and mood assessment  Hadley Fraire reports he could sleep all day, has anxiety and depression  Denies SI or self harm  Appetite is good  Family are supportive  Takes medication as prescribed  He reports he is not sure that they work or not       HPI ROS Appetite Changes and Sleep: normal appetite and decreased energy    Review Of Systems:     Mood Anxiety and Depression   Behavior Normal    Thought Content Normal   General Emotional Problems and Sleep Disturbances   Personality Normal   Other Psych Symptoms Normal   Constitutional As Noted in HPI   ENT As Noted in HPI   Cardiovascular As Noted in HPI   Respiratory As Noted in HPI   Gastrointestinal As Noted in HPI   Genitourinary As Noted in HPI   Musculoskeletal As Noted in HPI   Integumentary As Noted in HPI   Neurological As Noted in HPI   Endocrine Normal    Other Symptoms Normal        Substance Abuse History:  Social History     Substance and Sexual Activity   Drug Use No       Family Psychiatric History:   Family History   Problem Relation Age of Onset   • Suicide Attempts Father    • Depression Father    • Hyperlipidemia Maternal Aunt    • Stroke Maternal Aunt    • Cervical cancer Family        The following portions of the patient's history were reviewed and updated as appropriate: allergies, current medications, past family history, past medical history, past social history, past surgical history and problem list     Social History     Socioeconomic History   • Marital status: /Civil Union     Spouse name: Not on file   • Number of children: Not on file   • Years of education: Not on file   • Highest education level: Not on file   Occupational History   • Occupation: retired     Tobacco Use   • Smoking status: Former   • Smokeless tobacco: Never   • Tobacco comments:     quit about 1-2 years ago       Substance and Sexual Activity   • Alcohol use: Yes     Comment: rarely; social   • Drug use: No   • Sexual activity: Not Currently   Other Topics Concern   • Not on file   Social History Narrative    Daily coffee consumption     Social Determinants of Health     Financial Resource Strain: Not on file   Food Insecurity: Not on file   Transportation Needs: Not on file   Physical Activity: Not on file   Stress: Not on file   Social Connections: Not on file   Intimate Partner Violence: Not on file   Housing Stability: Not on file     Social History     Social History Narrative    Daily coffee consumption       Objective:       Mental status:  Appearance calm and cooperative , adequate hygiene and grooming and poor eye contact    Mood depressed and anxious   Affect affect appropriate    Speech a normal rate   Thought Processes normal thought processes   Hallucinations no hallucinations present    Thought Content no delusions   Abnormal Thoughts no suicidal thoughts  and no homicidal thoughts    Orientation  oriented to person and place and time   Remote Memory short term memory intact and long term memory intact   Attention Span concentration intact   Intellect Appears to be of Average Intelligence   Insight Limited insight   Judgement judgment was intact   Muscle Strength Muscle strength and tone were normal   Language no difficulty naming common objects   Fund of Knowledge displays adequate knowledge of current events   Pain moderate to severe   Pain Scale 5       Assessment/Plan:       Diagnoses and all orders for this visit:    Moderate episode of recurrent major depressive disorder (HCC)    OSITO (generalized anxiety disorder)            Treatment Recommendations- Risks Benefits      Immediate Medical/Psychiatric/Psychotherapy Treatments and Any Precautions:  reviewed medication continue with treatment plan, wean off cymbalta, reduce Seroquel to 100 mg at hs, he reduced his trazodone to 150 mg    Risks, Benefits And Possible Side Effects Of Medications:  {PSYCH RISK, BENEFITS AND POSSIBLE SIDE EFFECTS (Optional):95027    Controlled Medication Discussion: he is aware of safe use and storage of medication     Psychotherapy Provided: 30 min Individual psychotherapy provided  Supportive therapy  Medication evaluation  Discussed with Adiel, many of his medications are sedative   Discussed slowly reducing them  Coping skills discussed    Goals discussed in session: improved mood and energy    Treatment plan not due this session enacted September 27, 2022

## 2022-12-19 DIAGNOSIS — F41.9 ANXIETY: ICD-10-CM

## 2022-12-19 RX ORDER — CLONAZEPAM 2 MG/1
2 TABLET ORAL 2 TIMES DAILY
Qty: 60 TABLET | Refills: 0 | Status: CANCELLED | OUTPATIENT
Start: 2022-12-19

## 2022-12-19 NOTE — TELEPHONE ENCOUNTER
Medication Refill Request     Name of Medication clonazePAM (KlonoPIN) 2 mg tablet  Dose/Frequency 2 x aday  Quantity 60  Verified pharmacy   [x]  Verified ordering Provider   [x]  Does patient have enough for the next  days? Yes [] No [x]  Does patient have a follow-up appointment scheduled? Yes [x] No []   If so when is appointment: 1/4/2023   See refill request, please.    Patient has an appt. With Dr. Munson on 4/12/21.    Thank you

## 2022-12-22 ENCOUNTER — TELEPHONE (OUTPATIENT)
Dept: PSYCHIATRY | Facility: CLINIC | Age: 62
End: 2022-12-22

## 2022-12-22 DIAGNOSIS — F41.9 ANXIETY: ICD-10-CM

## 2022-12-22 RX ORDER — CLONAZEPAM 2 MG/1
2 TABLET ORAL 2 TIMES DAILY
Qty: 60 TABLET | Refills: 0 | Status: SHIPPED | OUTPATIENT
Start: 2022-12-22 | End: 2023-01-23 | Stop reason: SDUPTHER

## 2022-12-22 NOTE — TELEPHONE ENCOUNTER
Medication Refill Request     Name of Medication clonazePAM (KlonoPIN) 2 mg tablet  Dose/Frequency 1 tablet (2 mg total) by mouth 2 (two) times a day  Quantity 60  Verified pharmacy   [x]  Verified ordering Provider   [x]  Does patient have enough for the next 3 days? Yes [] No [x]  Does patient have a follow-up appointment scheduled?  Yes [x] No []   If so when is appointment: 01/04/23 @ 2:00 PM

## 2022-12-27 DIAGNOSIS — F41.1 GAD (GENERALIZED ANXIETY DISORDER): ICD-10-CM

## 2022-12-27 RX ORDER — BACLOFEN 10 MG/1
10 TABLET ORAL 2 TIMES DAILY PRN
Qty: 60 TABLET | Refills: 0 | Status: SHIPPED | OUTPATIENT
Start: 2022-12-27

## 2023-01-04 ENCOUNTER — TELEPHONE (OUTPATIENT)
Dept: PSYCHIATRY | Facility: CLINIC | Age: 63
End: 2023-01-04

## 2023-01-04 NOTE — TELEPHONE ENCOUNTER
Patient is calling regarding cancelling an appointment  Date/Time: 1/4/2023 @ 2:00 pm    Reason: Patient has very bad sinus infection and rescheduled appointment    Lives in [place] and can't do virtual     Patient was rescheduled: YES [x] NO []  If yes, when was Patient reschedule for: 1/30/2023    Patient requesting call back to reschedule: YES [x] NO []

## 2023-01-18 DIAGNOSIS — F41.1 GAD (GENERALIZED ANXIETY DISORDER): ICD-10-CM

## 2023-01-18 RX ORDER — BACLOFEN 10 MG/1
10 TABLET ORAL 2 TIMES DAILY PRN
Qty: 180 TABLET | Refills: 1 | Status: SHIPPED | OUTPATIENT
Start: 2023-01-18

## 2023-01-19 RX ORDER — QUETIAPINE FUMARATE 100 MG/1
200 TABLET, FILM COATED ORAL
Qty: 180 TABLET | Refills: 1 | Status: SHIPPED | OUTPATIENT
Start: 2023-01-19

## 2023-01-23 DIAGNOSIS — F41.9 ANXIETY: ICD-10-CM

## 2023-01-23 RX ORDER — CLONAZEPAM 2 MG/1
2 TABLET ORAL 2 TIMES DAILY
Qty: 60 TABLET | Refills: 0 | Status: SHIPPED | OUTPATIENT
Start: 2023-01-23

## 2023-01-23 NOTE — TELEPHONE ENCOUNTER
Medication Refill Request     Name of Medication clonazePAM  Dose/Frequency 2mg tablet take 1 tablet by mouth two times a day  Quantity 60  Verified pharmacy   [x]  Verified ordering Provider   [x]  Does patient have enough for the next 3 days? Yes [x] No []  Does patient have a follow-up appointment scheduled?  Yes [x] No []   If so when is appointment: 1/30/23

## 2023-01-30 ENCOUNTER — TELEPHONE (OUTPATIENT)
Dept: PSYCHIATRY | Facility: CLINIC | Age: 63
End: 2023-01-30

## 2023-01-30 NOTE — TELEPHONE ENCOUNTER
Patient is calling regarding cancelling an appointment.    Date/Time: 1/30/2023    Reason: has no car    Patient was rescheduled: YES [x] NO []  If yes, when was Patient reschedule for: 2/7/2023    Patient requesting call back to reschedule: YES [] NO [x]

## 2023-02-09 ENCOUNTER — OFFICE VISIT (OUTPATIENT)
Dept: PSYCHIATRY | Facility: CLINIC | Age: 63
End: 2023-02-09

## 2023-02-09 VITALS
WEIGHT: 242 LBS | SYSTOLIC BLOOD PRESSURE: 148 MMHG | HEIGHT: 70 IN | HEART RATE: 79 BPM | BODY MASS INDEX: 34.65 KG/M2 | DIASTOLIC BLOOD PRESSURE: 88 MMHG

## 2023-02-09 DIAGNOSIS — F33.1 MODERATE EPISODE OF RECURRENT MAJOR DEPRESSIVE DISORDER (HCC): ICD-10-CM

## 2023-02-09 DIAGNOSIS — F41.1 GAD (GENERALIZED ANXIETY DISORDER): Primary | ICD-10-CM

## 2023-02-09 DIAGNOSIS — R00.2 PALPITATIONS: ICD-10-CM

## 2023-02-11 NOTE — PSYCH
Visit Time    Visit Start Time: 2:00 PM  Visit Stop Time: 2:30 PM  Total Visit Duration: 30 minutes    Subjective:     Patient ID: Julissa Cornell is a 57 y o  male history of OSITO and depression seen for medication management and mood assessment  Luis Jensen reports moods are stable  Upset with computer and not working properly  Believes DAIN has something to do with it  He is eating and sleeping  Medications help with mood  Denies SI, HI  Takes medication as prescribed  Family are supportive      HPI ROS Appetite Changes and Sleep: normal appetite and normal energy level    Review Of Systems:     Mood Anxiety   Behavior Normal    Thought Content Normal   General Normal  and Sleep Disturbances   Personality Normal   Other Psych Symptoms Normal   Constitutional As Noted in HPI   ENT As Noted in HPI   Cardiovascular As Noted in HPI   Respiratory As Noted in HPI   Gastrointestinal As Noted in HPI   Genitourinary As Noted in HPI   Musculoskeletal As Noted in HPI   Integumentary As Noted in HPI   Neurological As Noted in HPI   Endocrine Normal    Other Symptoms Normal          Substance Abuse History:  Social History     Substance and Sexual Activity   Drug Use No       Family Psychiatric History:   Family History   Problem Relation Age of Onset   • Suicide Attempts Father    • Depression Father    • Hyperlipidemia Maternal Aunt    • Stroke Maternal Aunt    • Cervical cancer Family        The following portions of the patient's history were reviewed and updated as appropriate: allergies, current medications, past family history, past medical history, past social history, past surgical history and problem list     Social History     Socioeconomic History   • Marital status: /Civil Union     Spouse name: Not on file   • Number of children: Not on file   • Years of education: Not on file   • Highest education level: Not on file   Occupational History   • Occupation: retired     Tobacco Use   • Smoking status: Former   • Smokeless tobacco: Never   • Tobacco comments:     quit about 1-2 years ago       Substance and Sexual Activity   • Alcohol use: Yes     Comment: rarely; social   • Drug use: No   • Sexual activity: Not Currently   Other Topics Concern   • Not on file   Social History Narrative    Daily coffee consumption     Social Determinants of Health     Financial Resource Strain: Not on file   Food Insecurity: Not on file   Transportation Needs: Not on file   Physical Activity: Not on file   Stress: Not on file   Social Connections: Not on file   Intimate Partner Violence: Not on file   Housing Stability: Not on file     Social History     Social History Narrative    Daily coffee consumption       Objective:       Mental status:  Appearance calm and cooperative , adequate hygiene and grooming and good eye contact    Mood anxious   Affect affect appropriate    Speech a normal rate   Thought Processes normal thought processes   Hallucinations no hallucinations present    Thought Content no delusions   Abnormal Thoughts no suicidal thoughts  and no homicidal thoughts    Orientation  oriented to person and place and time   Remote Memory short term memory intact and long term memory intact   Attention Span concentration intact   Intellect Appears to be of Average Intelligence   Insight Insight intact   Judgement judgment was intact   Muscle Strength Muscle strength and tone were normal   Language no difficulty naming common objects   Fund of Knowledge displays adequate knowledge of current events   Pain none   Pain Scale 0       Assessment/Plan:       Diagnoses and all orders for this visit:    OSITO (generalized anxiety disorder)    Palpitations    Moderate episode of recurrent major depressive disorder ([facility] [place] [address] )            Treatment Recommendations- Risks Benefits      Immediate Medical/Psychiatric/Psychotherapy Treatments and Any Precautions:  reviewed medication continue with treatment plan    Risks, Benefits And Possible Side Effects Of Medications:  {PSYCH RISK, BENEFITS AND POSSIBLE SIDE EFFECTS (Optional):73487    Controlled Medication Discussion: he is aware of safe use and storeage of medications     Psychotherapy Provided: 30 min Individual psychotherapy provided     Supportive therapy  Medication evaluation  Mood assessment      Goals discussed in session: maintain stable moods    Treatment plan not due enacted September 27, 2022

## 2023-02-20 DIAGNOSIS — F41.9 ANXIETY: ICD-10-CM

## 2023-02-20 DIAGNOSIS — G47.00 INSOMNIA, UNSPECIFIED TYPE: ICD-10-CM

## 2023-02-20 RX ORDER — QUETIAPINE FUMARATE 50 MG/1
50 TABLET, FILM COATED ORAL
Qty: 90 TABLET | Refills: 0 | Status: SHIPPED | OUTPATIENT
Start: 2023-02-20

## 2023-02-20 RX ORDER — TRAZODONE HYDROCHLORIDE 300 MG/1
300 TABLET ORAL
Qty: 90 TABLET | Refills: 0 | Status: SHIPPED | OUTPATIENT
Start: 2023-02-20

## 2023-02-20 NOTE — TELEPHONE ENCOUNTER
Medication Refill Request     Name of Medication Trazodone 300 mg   Dose/Frequency 1qd hs  Quantity 90  Verified pharmacy   [x]  Verified ordering Provider   [x]  Does patient have enough for the next 3 days? Yes [] No [x]  Does patient have a follow-up appointment scheduled? Yes [x] No []   If so when is appointment: 5/9/2023    Medication Refill Request     Name of Medication Seroquel 50 mg  Dose/Frequency 1qd after breakfast  Quantity 90  Verified pharmacy   [x]  Verified ordering Provider   [x]  Does patient have enough for the next 3 days? Yes [] No [x]  Does patient have a follow-up appointment scheduled?  Yes [x] No []   If so when is appointment: 5/9/2023

## 2023-02-21 DIAGNOSIS — F41.1 GAD (GENERALIZED ANXIETY DISORDER): ICD-10-CM

## 2023-02-21 DIAGNOSIS — F33.1 MODERATE EPISODE OF RECURRENT MAJOR DEPRESSIVE DISORDER (HCC): ICD-10-CM

## 2023-02-21 RX ORDER — DULOXETIN HYDROCHLORIDE 20 MG/1
40 CAPSULE, DELAYED RELEASE ORAL 2 TIMES DAILY
Qty: 360 CAPSULE | Refills: 2 | Status: SHIPPED | OUTPATIENT
Start: 2023-02-21

## 2023-02-21 NOTE — TELEPHONE ENCOUNTER
Medication Refill Request     Name of Medication Cymbalta 20 mg  Dose/Frequency 2bid  Quantity 360  Verified pharmacy   [x]  Verified ordering Provider   [x]  Does patient have enough for the next 3 days? Yes [] No [x]  Does patient have a follow-up appointment scheduled?  Yes [x] No []   If so when is appointment: 5/0/2023

## 2023-02-22 DIAGNOSIS — F41.9 ANXIETY: ICD-10-CM

## 2023-02-23 RX ORDER — CLONAZEPAM 2 MG/1
2 TABLET ORAL 2 TIMES DAILY
Qty: 60 TABLET | Refills: 0 | Status: SHIPPED | OUTPATIENT
Start: 2023-02-23

## 2023-03-24 DIAGNOSIS — F41.1 GAD (GENERALIZED ANXIETY DISORDER): ICD-10-CM

## 2023-03-24 DIAGNOSIS — F41.9 ANXIETY: ICD-10-CM

## 2023-03-24 RX ORDER — QUETIAPINE FUMARATE 100 MG/1
200 TABLET, FILM COATED ORAL
Qty: 180 TABLET | Refills: 1 | Status: SHIPPED | OUTPATIENT
Start: 2023-03-24

## 2023-03-24 RX ORDER — CLONAZEPAM 2 MG/1
2 TABLET ORAL 2 TIMES DAILY
Qty: 60 TABLET | Refills: 0 | Status: SHIPPED | OUTPATIENT
Start: 2023-03-24

## 2023-03-24 NOTE — TELEPHONE ENCOUNTER
Medication Refill Request     Name of Medication clonazePAM (KlonoPIN) 2 mg tablet  Dose/Frequency Take 1 tablet (2 mg total) by mouth 2 (two) times a day  Quantity 60  Verified pharmacy   [x]  Verified ordering Provider   [x]  Does patient have enough for the next 3 days? Yes [x] No []  Does patient have a follow-up appointment scheduled? Yes [x] No []   If so when is appointment: 05/09/23 @ 2 PM        Medication Refill Request      Name of Medication QUEtiapine (SEROquel) 100 mg tablet  Dose/Frequency TAKE 2 TABLETS (200 MG TOTAL) BY MOUTH DAILY AT BEDTIME   Quantity 180  Verified pharmacy   [x]  Verified ordering Provider   [x]  Does patient have enough for the next 3 days? Yes [] No []  Does patient have a follow-up appointment scheduled?  Yes [x] No []    If so when is appointment: 05/09/23 @ 2 PM

## 2023-04-24 DIAGNOSIS — F41.9 ANXIETY: ICD-10-CM

## 2023-04-24 RX ORDER — CLONAZEPAM 2 MG/1
2 TABLET ORAL 2 TIMES DAILY
Qty: 60 TABLET | Refills: 0 | Status: SHIPPED | OUTPATIENT
Start: 2023-04-24

## 2023-04-24 NOTE — TELEPHONE ENCOUNTER
Medication Refill Request     Name of Medication clonazePAM (KlonoPIN) 2 mg tablet  Dose/Frequency Take 1 tablet (2 mg total) by mouth 2 (two) times a day  Quantity 60  Verified pharmacy   [x]  Verified ordering Provider   [x]  Does patient have enough for the next 3 days? Yes [] No [x]  Does patient have a follow-up appointment scheduled?  Yes [x] No []   If so when is appointment: 05/09/23 @ 2 PM

## 2023-05-09 ENCOUNTER — OFFICE VISIT (OUTPATIENT)
Dept: PSYCHIATRY | Facility: CLINIC | Age: 63
End: 2023-05-09

## 2023-05-09 VITALS
BODY MASS INDEX: 35.36 KG/M2 | SYSTOLIC BLOOD PRESSURE: 170 MMHG | DIASTOLIC BLOOD PRESSURE: 93 MMHG | HEIGHT: 70 IN | WEIGHT: 247 LBS | HEART RATE: 75 BPM

## 2023-05-09 DIAGNOSIS — F33.1 MODERATE EPISODE OF RECURRENT MAJOR DEPRESSIVE DISORDER (HCC): Primary | ICD-10-CM

## 2023-05-09 DIAGNOSIS — F41.1 GAD (GENERALIZED ANXIETY DISORDER): ICD-10-CM

## 2023-05-09 DIAGNOSIS — F41.9 ANXIETY: ICD-10-CM

## 2023-05-09 RX ORDER — QUETIAPINE FUMARATE 50 MG/1
50 TABLET, FILM COATED ORAL
Qty: 90 TABLET | Refills: 0 | Status: SHIPPED | OUTPATIENT
Start: 2023-05-09

## 2023-05-09 RX ORDER — BACLOFEN 10 MG/1
10 TABLET ORAL 2 TIMES DAILY PRN
Qty: 180 TABLET | Refills: 1 | Status: SHIPPED | OUTPATIENT
Start: 2023-05-09

## 2023-05-13 NOTE — BH TREATMENT PLAN
TREATMENT PLAN (Medication Management Only)         [address]     Name and Date of Birth:  Abraham England 57 y o  1960  Date of Treatment Plan: September 27, 2022, May 9, 2023  Diagnosis/Diagnoses:    1  OSITO (generalized anxiety disorder)    2  Insomnia, unspecified type    3  Moderate episode of recurrent major depressive disorder [facility])       Strengths/Personal Resources for Self-Care: supportive family, supportive friends, taking medications as prescribed, ability to communicate needs  Area/Areas of need (in own words): anxiety, depression, mood instability, sleep problems  1  Long Term Goal: improve mood stability and sleep  Target Date:6 months - 11/9/2023  Person/Persons responsible for completion of goal: Guerita Guillen, provider, family  2  Short Term Objective (s) - How will we reach this goal?:   A  Provider new recommended medication/dosage changes and/or continue medication(s): continue current medications as prescribed  B  structure  C  self care, therapy  Target Date:6 months - 11/9/2023  Person/Persons Responsible for Completion of Goal: Guerita Guillen, provider, family  Progress Towards Goals: continuing treatment  Treatment Modality: medication management every 3 months  Review due 180 days from date of this plan: 6 months - 11/09/2023  Expected length of service: maintenance  My Physician/PA/NP and I have developed this plan together and I agree to work on the goals and objectives  I understand the treatment goals that were developed for my treatment

## 2023-05-13 NOTE — PSYCH
Visit Time    Visit Start Time: 2:00 PM  Visit Stop Time: 2:30 PM  Total Visit Duration: 30 minutes    Subjective:     Patient ID: Oly De La Fuente is a 57 y o  male history of anxiety and depression seen for medication management and mood assessment  Madiha Carrillo reports that he is eating and sleeping well  Stress continues with anxiety regarding household chores and dog's health  Medication is reported to help anxiety  Takes medication as prescribed  Family are supportive    HPI ROS Appetite Changes and Sleep: normal appetite and normal energy level    Review Of Systems:     Mood Anxiety and Depression   Behavior Normal    Thought Content Normal   General Emotional Problems   Personality Normal   Other Psych Symptoms Normal   Constitutional As Noted in HPI   ENT As Noted in HPI   Cardiovascular As Noted in HPI   Respiratory As Noted in HPI   Gastrointestinal As Noted in HPI   Genitourinary As Noted in HPI   Musculoskeletal As Noted in HPI   Integumentary As Noted in HPI   Neurological As Noted in HPI   Endocrine Normal    Other Symptoms Normal        Substance Abuse History:  Social History     Substance and Sexual Activity   Drug Use No       Family Psychiatric History:   Family History   Problem Relation Age of Onset   • Suicide Attempts Father    • Depression Father    • Hyperlipidemia Maternal Aunt    • Stroke Maternal Aunt    • Cervical cancer Family        The following portions of the patient's history were reviewed and updated as appropriate: allergies, current medications, past family history, past medical history, past social history, past surgical history and problem list     Social History     Socioeconomic History   • Marital status: /Civil Union     Spouse name: Not on file   • Number of children: Not on file   • Years of education: Not on file   • Highest education level: Not on file   Occupational History   • Occupation: retired     Tobacco Use   • Smoking status: Former   • Smokeless tobacco: Never   • Tobacco comments:     quit about 1-2 years ago  Substance and Sexual Activity   • Alcohol use: Yes     Comment: rarely; social   • Drug use: No   • Sexual activity: Not Currently   Other Topics Concern   • Not on file   Social History Narrative    Daily coffee consumption     Social Determinants of Health     Financial Resource Strain: Not on file   Food Insecurity: Not on file   Transportation Needs: Not on file   Physical Activity: Not on file   Stress: Not on file   Social Connections: Not on file   Intimate Partner Violence: Not on file   Housing Stability: Not on file     Social History     Social History Narrative    Daily coffee consumption       Objective:       Mental status:  Appearance calm and cooperative , adequate hygiene and grooming and good eye contact    Mood anxious   Affect affect appropriate    Speech a normal rate   Thought Processes normal thought processes   Hallucinations no hallucinations present    Thought Content no delusions   Abnormal Thoughts no suicidal thoughts  and no homicidal thoughts    Orientation  oriented to person and place and time   Remote Memory short term memory intact and long term memory intact   Attention Span concentration intact   Intellect Appears to be of Average Intelligence   Insight Insight intact   Judgement judgment was intact   Muscle Strength Muscle strength and tone were normal   Language no difficulty naming common objects   Fund of Knowledge displays adequate knowledge of current events   Pain none   Pain Scale 0       Assessment/Plan:       Diagnoses and all orders for this visit:    Moderate episode of recurrent major depressive disorder (HCC)    Anxiety  -     QUEtiapine (SEROquel) 50 mg tablet; Take 1 tablet (50 mg total) by mouth daily after breakfast    OSITO (generalized anxiety disorder)  -     baclofen 10 mg tablet;  Take 1 tablet (10 mg total) by mouth 2 (two) times a day as needed for muscle spasms (and anxiety)            Treatment Recommendations- Risks Benefits      Immediate Medical/Psychiatric/Psychotherapy Treatments and Any Precautions:  reviewed medication continue with treatment plan    Risks, Benefits And Possible Side Effects Of Medications:  {PSYCH RISK, BENEFITS AND POSSIBLE SIDE EFFECTS (Optional):41018    Controlled Medication Discussion: He is aware of safe use and storage of medication     Psychotherapy Provided: 30 min Individual psychotherapy provided       Goals discussed in session: Maintain stable mood    Treatment plan updated and verbal consent obtained

## 2023-05-19 DIAGNOSIS — F41.1 GAD (GENERALIZED ANXIETY DISORDER): ICD-10-CM

## 2023-05-19 DIAGNOSIS — F33.1 MODERATE EPISODE OF RECURRENT MAJOR DEPRESSIVE DISORDER (HCC): ICD-10-CM

## 2023-05-19 NOTE — TELEPHONE ENCOUNTER
Medication Refill Request     Name of MedicationDULoxetine (CYMBALTA) 20 mg capsule  Dose/Frequency Take 2 capsules (40 mg total) by mouth 2 (two) times a day  Quantity 360  Verified pharmacy   [x]  Verified ordering Provider   [x]  Does patient have enough for the next 3 days? Yes [x] No []  Does patient have a follow-up appointment scheduled?  Yes [x] No []   If so when is appointment: 8/9/23 no

## 2023-05-20 RX ORDER — DULOXETIN HYDROCHLORIDE 20 MG/1
40 CAPSULE, DELAYED RELEASE ORAL 2 TIMES DAILY
Qty: 360 CAPSULE | Refills: 2 | Status: SHIPPED | OUTPATIENT
Start: 2023-05-20

## 2023-05-24 DIAGNOSIS — F41.9 ANXIETY: ICD-10-CM

## 2023-05-24 RX ORDER — CLONAZEPAM 2 MG/1
2 TABLET ORAL 2 TIMES DAILY
Qty: 60 TABLET | Refills: 0 | Status: SHIPPED | OUTPATIENT
Start: 2023-05-24

## 2023-06-22 DIAGNOSIS — F41.9 ANXIETY: ICD-10-CM

## 2023-06-22 RX ORDER — CLONAZEPAM 2 MG/1
2 TABLET ORAL 2 TIMES DAILY
Qty: 60 TABLET | Refills: 0 | Status: SHIPPED | OUTPATIENT
Start: 2023-06-22

## 2023-06-22 NOTE — TELEPHONE ENCOUNTER
Medication Refill Request     Name of Medication clonazePAM (KlonoPIN) 2 mg tablet  Dose/Frequency Take 1 tablet (2 mg total) by mouth 2 (two) times a day  Quantity 60  Verified pharmacy   [x]  Verified ordering Provider   [x]  Does patient have enough for the next 3 days? Yes [] No [x]  Does patient have a follow-up appointment scheduled?  Yes [x] No []   If so when is appointment: 08/09/23 @ 2 PM

## 2023-08-01 DIAGNOSIS — F41.9 ANXIETY: ICD-10-CM

## 2023-08-01 RX ORDER — QUETIAPINE FUMARATE 50 MG/1
50 TABLET, FILM COATED ORAL
Qty: 90 TABLET | Refills: 0 | Status: SHIPPED | OUTPATIENT
Start: 2023-08-01

## 2023-08-04 DIAGNOSIS — F41.9 ANXIETY: ICD-10-CM

## 2023-08-05 RX ORDER — CLONAZEPAM 2 MG/1
2 TABLET ORAL 2 TIMES DAILY
Qty: 60 TABLET | Refills: 0 | Status: SHIPPED | OUTPATIENT
Start: 2023-08-05

## 2023-08-16 ENCOUNTER — TELEPHONE (OUTPATIENT)
Dept: PSYCHIATRY | Facility: CLINIC | Age: 63
End: 2023-08-16

## 2023-08-16 ENCOUNTER — OFFICE VISIT (OUTPATIENT)
Dept: PSYCHIATRY | Facility: CLINIC | Age: 63
End: 2023-08-16
Payer: COMMERCIAL

## 2023-08-16 VITALS
HEIGHT: 70 IN | HEART RATE: 74 BPM | SYSTOLIC BLOOD PRESSURE: 127 MMHG | BODY MASS INDEX: 32.58 KG/M2 | DIASTOLIC BLOOD PRESSURE: 83 MMHG | WEIGHT: 227.6 LBS

## 2023-08-16 DIAGNOSIS — F41.1 GAD (GENERALIZED ANXIETY DISORDER): Primary | ICD-10-CM

## 2023-08-16 DIAGNOSIS — Z63.0 MARITAL PROBLEMS: ICD-10-CM

## 2023-08-16 DIAGNOSIS — F33.1 MODERATE EPISODE OF RECURRENT MAJOR DEPRESSIVE DISORDER (HCC): ICD-10-CM

## 2023-08-16 DIAGNOSIS — F41.9 ANXIETY: ICD-10-CM

## 2023-08-16 DIAGNOSIS — G47.00 INSOMNIA, UNSPECIFIED TYPE: ICD-10-CM

## 2023-08-16 PROCEDURE — 99214 OFFICE O/P EST MOD 30 MIN: CPT | Performed by: NURSE PRACTITIONER

## 2023-08-16 PROCEDURE — 90833 PSYTX W PT W E/M 30 MIN: CPT | Performed by: NURSE PRACTITIONER

## 2023-08-16 RX ORDER — TRAZODONE HYDROCHLORIDE 300 MG/1
300 TABLET ORAL
Qty: 90 TABLET | Refills: 0 | Status: SHIPPED | OUTPATIENT
Start: 2023-08-16

## 2023-08-16 RX ORDER — BACLOFEN 10 MG/1
10 TABLET ORAL 2 TIMES DAILY PRN
Qty: 180 TABLET | Refills: 1 | Status: SHIPPED | OUTPATIENT
Start: 2023-08-16

## 2023-08-16 RX ORDER — DULOXETIN HYDROCHLORIDE 20 MG/1
40 CAPSULE, DELAYED RELEASE ORAL 2 TIMES DAILY
Qty: 360 CAPSULE | Refills: 2 | Status: SHIPPED | OUTPATIENT
Start: 2023-08-16

## 2023-08-16 SDOH — SOCIAL STABILITY - SOCIAL INSECURITY: PROBLEMS IN RELATIONSHIP WITH SPOUSE OR PARTNER: Z63.0

## 2023-08-16 NOTE — TELEPHONE ENCOUNTER
HERNÁNM for patient to call the office for an appointment with [facility] 08/25/23 @ 10 am

## 2023-08-16 NOTE — BH CRISIS PLAN
Client Name: Apolinar Hayes       Client YOB: 1960  : 1960    Treatment Team (include name and contact information):     Psychotherapist: looking for one    Psychiatrist: [facility]       Healthcare Provider  Omi DO Cash Patrick [facility]  [phone]    Type of Plan   * Child plans (children 15 yo and younger) must be completed and signed by the child's legal guardian   * Plans for all individuals 15 yo and above must be signed by the client. Plan Type: adolescent/adult (15 and over) Initial      My Personal Strengths are (in the client's own words):  "like to keep busy, honest"  The stressors and triggers that may put me at risk are:  arguments with wife and step children,     Coping skills I can use to keep myself calm and safe: Other (describe) watch TV, shopping,    Coping skills/supports I can use to maintain abstinence from substance use:   Not Applicable    The people that provide me with help and support: (Include name, contact, and how they can help)   Support person #1: wife if well    * Phone number: in cell phone    * How can they help me? Support   No one    In the past, the following has helped me in times of crisis:    Other: as above      If it is an emergency and you need immediate help, call     If there is a possibility of danger to yourself or others, call the following crisis hotline resources:     Adult Crisis Numbers  Suicide Prevention Hotline - Dial   [facility]: [address]: [address]: [address]: [phone]  [facility]: [address]  [facility] [address]: [address]: [address]: [phone] (daytime).        [phone] (after hours, weekends, holidays)     Child/Adolescent Crisis Numbers   [facility]: [address]: [phone]   Sugey Duncaner: [phone]   [facility]: [phone]    Please note: Some counties do not have a separate number for Child/Adolescent specific crisis. If your county is not listed under Child/Adolescent, please call the adult number for your county     National Talk to Text Line   All [facility]br[facility] - [phone]    In the event your feelings become unmanageable, and you cannot reach your support system, you will call 911 immediately or go to the nearest hospital emergency room.

## 2023-08-16 NOTE — PSYCH
Visit Time    Visit Start Time: 4:00 PM  Visit Stop Time: 4:30 PM  Total Visit Duration: 30 min minutes    Subjective:     Patient ID: Jonn Shin is a 70 y.o. male history of anxiety, panic disorder, depression seen for medication management and mood assessment. Verónica Calvert reports his wife was in the hospital had several surgeries and had a fever and he was concerned about her. He reports they have been fighting more often and having conflict between her children and himself. This is increased his anxiety and depression, denies suicidal ideation. Reports appetite is good, sleep is fair to good. Takes medication as prescribed and this is helpful.     HPI ROS Appetite Changes and Sleep: normal appetite and normal energy level    Review Of Systems:     Mood Anxiety and Depression   Behavior Normal    Thought Content Normal   General Relationship Problems, Emotional Problems and Sleep Disturbances   Personality Normal   Other Psych Symptoms Normal   Constitutional As Noted in HPI   ENT As Noted in HPI   Cardiovascular As Noted in HPI   Respiratory As Noted in HPI   Gastrointestinal As Noted in HPI   Genitourinary As Noted in HPI   Musculoskeletal As Noted in HPI   Integumentary As Noted in HPI   Neurological As Noted in HPI   Endocrine adrenal   Other Symptoms Normal          Substance Abuse History:  Social History     Substance and Sexual Activity   Drug Use No       Family Psychiatric History:   Family History   Problem Relation Age of Onset   • Suicide Attempts Father    • Depression Father    • Hyperlipidemia Maternal Aunt    • Stroke Maternal Aunt    • Cervical cancer Family        The following portions of the patient's history were reviewed and updated as appropriate: allergies, current medications, past family history, past medical history, past social history, past surgical history and problem list.    Social History     Socioeconomic History   • Marital status: /Civil Union     Spouse name: Not on file   • Number of children: Not on file   • Years of education: Not on file   • Highest education level: Not on file   Occupational History   • Occupation: retired     Tobacco Use   • Smoking status: Former   • Smokeless tobacco: Never   • Tobacco comments:     quit about 1-2 years ago. Substance and Sexual Activity   • Alcohol use: Yes     Comment: rarely; social   • Drug use: No   • Sexual activity: Not Currently   Other Topics Concern   • Not on file   Social History Narrative    Daily coffee consumption     Social Determinants of Health     Financial Resource Strain: Not on file   Food Insecurity: Not on file   Transportation Needs: Not on file   Physical Activity: Not on file   Stress: Not on file   Social Connections: Not on file   Intimate Partner Violence: Not on file   Housing Stability: Not on file     Social History     Social History Narrative    Daily coffee consumption       Objective:       Mental status:  Appearance adequate hygiene and grooming, restless and fidgety and good eye contact    Mood depressed and anxious   Affect affect appropriate    Speech a normal rate   Thought Processes normal thought processes   Hallucinations no hallucinations present    Thought Content no delusions   Abnormal Thoughts no suicidal thoughts  and no homicidal thoughts    Orientation  oriented to person and place and time   Remote Memory short term memory intact and long term memory intact   Attention Span concentration intact   Intellect Appears to be of Average Intelligence   Insight Insight intact   Judgement judgment was intact   Muscle Strength Muscle strength and tone were normal   Language no difficulty naming common objects   Fund of Knowledge displays adequate knowledge of current events   Pain none   Pain Scale 0       Assessment/Plan:       Diagnoses and all orders for this visit:    OSITO (generalized anxiety disorder)  -     Ambulatory referral to [address] [place] [address];  Future  - DULoxetine (CYMBALTA) 20 mg capsule; Take 2 capsules (40 mg total) by mouth 2 (two) times a day  -     baclofen 10 mg tablet; Take 1 tablet (10 mg total) by mouth 2 (two) times a day as needed for muscle spasms (and anxiety)    Moderate episode of recurrent major depressive disorder ([address])  -     Ambulatory referral to [address] [place] [address]; Future  -     DULoxetine (CYMBALTA) 20 mg capsule; Take 2 capsules (40 mg total) by mouth 2 (two) times a day    Marital problems  -     Ambulatory referral to [address] [place] [address]; Future    Anxiety  -     traZODone (DESYREL) 300 MG tablet; Take 1 tablet (300 mg total) by mouth daily at bedtime    Insomnia, unspecified type  -     traZODone (DESYREL) 300 MG tablet; Take 1 tablet (300 mg total) by mouth daily at bedtime            Treatment Recommendations- Risks Benefits      Immediate Medical/Psychiatric/Psychotherapy Treatments and Any Precautions:  reviewed medication continue with treatment plan, refer to therapy    Risks, Benefits And Possible Side Effects Of Medications:  {PSYCH RISK, BENEFITS AND POSSIBLE SIDE EFFECTS (Optional):14057    Controlled Medication Discussion: He is aware of safe use and storage of medication     Psychotherapy Provided: 30 minutes individual psychotherapy provided.    Supportive therapy  Medication evaluation  Mood assessment  Normalized and validated feelings  Crisis plan developed  Ambulatory behavioral health referral sent    Goals discussed in session: Improve anxiety and depression

## 2023-08-17 ENCOUNTER — TELEPHONE (OUTPATIENT)
Dept: PSYCHIATRY | Facility: CLINIC | Age: 63
End: 2023-08-17

## 2023-08-17 NOTE — TELEPHONE ENCOUNTER
Contacted pt in regards to routine referral and to get placed on proper wait list. LVM to contact the intake dept.

## 2023-08-21 ENCOUNTER — SOCIAL WORK (OUTPATIENT)
Dept: BEHAVIORAL/MENTAL HEALTH CLINIC | Facility: CLINIC | Age: 63
End: 2023-08-21
Payer: COMMERCIAL

## 2023-08-21 DIAGNOSIS — F33.9 EPISODE OF RECURRENT MAJOR DEPRESSIVE DISORDER, UNSPECIFIED DEPRESSION EPISODE SEVERITY (HCC): ICD-10-CM

## 2023-08-21 DIAGNOSIS — F41.1 GAD (GENERALIZED ANXIETY DISORDER): Primary | ICD-10-CM

## 2023-08-21 PROCEDURE — 90791 PSYCH DIAGNOSTIC EVALUATION: CPT | Performed by: SOCIAL WORKER

## 2023-08-23 NOTE — TELEPHONE ENCOUNTER
Contacted patient in regards to routine referral,. Patient Is establish patient. lvm for patient to contact intake department.

## 2023-09-05 DIAGNOSIS — F41.9 ANXIETY: ICD-10-CM

## 2023-09-05 RX ORDER — CLONAZEPAM 2 MG/1
2 TABLET ORAL 2 TIMES DAILY
Qty: 60 TABLET | Refills: 0 | Status: SHIPPED | OUTPATIENT
Start: 2023-09-05

## 2023-09-07 DIAGNOSIS — F41.1 GAD (GENERALIZED ANXIETY DISORDER): ICD-10-CM

## 2023-09-07 RX ORDER — QUETIAPINE FUMARATE 100 MG/1
200 TABLET, FILM COATED ORAL
Qty: 60 TABLET | Refills: 0 | Status: SHIPPED | OUTPATIENT
Start: 2023-09-07

## 2023-09-18 ENCOUNTER — TELEPHONE (OUTPATIENT)
Dept: BEHAVIORAL/MENTAL HEALTH CLINIC | Facility: CLINIC | Age: 63
End: 2023-09-18

## 2023-09-18 NOTE — TELEPHONE ENCOUNTER
Patient is calling regarding cancelling an appointment.    Date/Time: 9/18/2023 12:00pm    Reason: stomach virus    Patient was rescheduled: YES [] NO [x]  If yes, when was Patient reschedule for:     Patient requesting call back to reschedule: YES [] NO [x]

## 2023-09-22 ENCOUNTER — TELEPHONE (OUTPATIENT)
Dept: PSYCHIATRY | Facility: CLINIC | Age: 63
End: 2023-09-22

## 2023-09-22 NOTE — TELEPHONE ENCOUNTER
Called patient to offer available but had to leave voice mail for call back to accept or decline appointments with Gallo Jarrett LCSW. Awaiting call back.

## 2023-09-30 DIAGNOSIS — F41.1 GAD (GENERALIZED ANXIETY DISORDER): ICD-10-CM

## 2023-10-02 ENCOUNTER — SOCIAL WORK (OUTPATIENT)
Dept: BEHAVIORAL/MENTAL HEALTH CLINIC | Facility: CLINIC | Age: 63
End: 2023-10-02
Payer: COMMERCIAL

## 2023-10-02 DIAGNOSIS — F33.9 EPISODE OF RECURRENT MAJOR DEPRESSIVE DISORDER, UNSPECIFIED DEPRESSION EPISODE SEVERITY (HCC): Primary | ICD-10-CM

## 2023-10-02 PROCEDURE — 90837 PSYTX W PT 60 MINUTES: CPT | Performed by: SOCIAL WORKER

## 2023-10-02 RX ORDER — QUETIAPINE FUMARATE 100 MG/1
200 TABLET, FILM COATED ORAL
Qty: 180 TABLET | Refills: 1 | Status: SHIPPED | OUTPATIENT
Start: 2023-10-02 | End: 2023-10-06 | Stop reason: SDUPTHER

## 2023-10-02 NOTE — BH CRISIS PLAN
Client Name: Jose M Stover       Client YOB: 1960  : 1960    Treatment Team (include name and contact information):     Psychotherapist: Galileo Taylor    Psychiatrist: Jose Adame   Release of information completed: No     Healthcare Provider  Any Reeves DO   [place] [phone]    Type of Plan   * Child plans (children 15 yo and younger) must be completed and signed by the child's legal guardian   * Plans for all individuals 15 yo and above must be signed by the client. Plan Type: Initial     My Personal Strengths are (in the client's own words):  Get along well with most people 'other than my wife and some of her family'  Strong willed, will hold to my values    The stressors and triggers that may put me at risk are:  When family members argue and provoke me    Coping skills I can use to keep myself calm and safe:  Play with the dog  Watch tv  Being in a quiet space  Going to sleep    Coping skills/supports I can use to maintain abstinence from substance use:   NA. Client does acknowledge using Cratem, does not show any desire to abstain at this time. The people that provide me with help and support: (Include name, contact, and how they can help)   Support person #1: Kerline Birch    * Phone number: In Cell    * How can they help me? She went through divorce, could be supportive   Support person #2: Bita Garsia, Niece    * Phone number: In Cell    * How can they help me?  Give support and would be understanding    In the past, the following has helped me in times of crisis:    Taking a walk or exercising, being in a quiet space, taking a nap      If it is an emergency and you need immediate help, call     If there is a possibility of danger to yourself or others, call the following crisis hotline resources:     Adult Crisis Numbers  Suicide Prevention Hotline - Dial   [facility]: [address]: [address]: [address]: [phone]  [address] [place] [address] Street: [phone]  [facility]: [address]: [address]: [phone] (daytime). [phone] (after hours, weekends, holidays)     Child/Adolescent Crisis Numbers   [facility]S [facility]: [address]: [phone]   Danielkaroline Downey: [phone]   [address] [place] [address] Street: [phone]    Please note: Some [facility] do not have a separate number for Child/Adolescent specific crisis. If your county is not listed under Child/Adolescent, please call the adult number for your county     National Talk to Text Line   All Ages - [phone]    In the event your feelings become unmanageable, and you cannot reach your support system, you will call 911 immediately or go to the nearest hospital emergency room.

## 2023-10-02 NOTE — BH TREATMENT PLAN
[address]  1960     Date of Initial Psychotherapy Assessment: 8/21/2023   Date of Current Treatment Plan: 10/02/23  Treatment Plan Target Date: 8/21/2024  Treatment Plan Expiration Date: 8/21/2024    Diagnosis:   No diagnosis found. Area(s) of Need: over sleeping, fatigue, some days lack of motivation, some days don't want to get up, irritability, feeling stressed, tension in home environment, strained marriage, possibility of divorce, overthinking, hard to shut it off at night, insomnia treated with medication, self isolation 'walling myself off'    Long Term Goal 1 (in the client's own words):  Reduce depression and anxiety levels and symptoms    Stage of Change: Contemplation    Target Date for completion:      Anticipated therapeutic modalities: CBT, Client-Centered, Supportive, MI, DBT, Mindfulness     People identified to complete this goal: Chung Vasquez      Objective 1: (identify the means of measuring success in meeting the objective): Process thoughts, feelings around marital issues. Objective 2: (identify the means of measuring success in meeting the objective): Learn and practice healthy coping skills. Objective 2: (identify the means of measuring success in meeting the objective): Process unresolved trauma that may be underlying current functioning. I am currently under the care of a [facility] psychiatric provider: Yes     My [facility] psychiatric provider is: Bernard Lilly am currently taking psychiatric medications: Yes, as prescribed    I feel that I will be ready for discharge from mental health care when I reach the following (measurable goal/objective): I will feel more optimistic about my future. For children and adults who have a legal guardian:   Has there been any change to custody orders and/or guardianship status? NA. If yes, attach updated documentation.     I have not created my Crisis Plan and have been offered a copy of this plan    [address]: Diagnosis and Treatment Plan explained to Apolinar Hayes acknowledges an understanding of their diagnosis. Apolinar Hayes agrees to this treatment plan. I have been offered a copy of this Treatment Plan.  Yes

## 2023-10-02 NOTE — PSYCH
Behavioral Health Psychotherapy Progress Note    Psychotherapy Provided: Individual Psychotherapy     No diagnosis found. Goals addressed in session: Goal 1     DATA: Miguel Ashley continues to report depressed mood and symptoms including lack of motivation, over sleeping (goes to bed around 2-3 am and sleeps until 12 noon), fatigue, some days don't want to get up, feels stressed, tension in home environment, strained marriage, possibility of divorce, overthinking, hard to shut it off at night, insomnia treated with medication, self isolation 'walling myself off'    g. He relates his wife has asked for a divorce but has not filed. He states that they have been living estranged and parallel lives for some time and relates he had been the one asking for divorce in the past. Precipitating event occurred several weeks ago when his step daughter, her  and step grandchild moved into their home temporarily. As time went on and the  was not working or looking for a job, Miguel Ashley struggled with the  not going out and working, getting a job. He relates one day he got sick of it and kicked him out, told his wife only step daughter and child could stay. Miguel Ashley is ambivalent about divorce. Currently they have separate bank accounts. Miguel Ashley has a pension and they own their home together. He relates resentments towards his wife and step children who he raised that they do not appreciate him. He relates in his wife's eyes her children can do no wrong. He relates they eat dinner together but other than that they live on separate floors. He has offered to go to couples counseling however his wife told him it was too late. He has been going out walking and playing with their new lab and that helps him cope. He has few family left and has not kept up with most of his friends.  Active listening, provides empathetic response, validation of experience, assesses risk factors, reviews safe coping skills handout and discusses current coping strategies. Positively reinforces his not engaging in arguments with step daughter and spouse when they intentionally provoke him, getting outside and spending time with pet. Discusses importance of building, reconnecting with a support network and identifies some family and friends he might reconnect with. Also discusses possibility of speaking with a  to learn more about his rights, options. During this session, this clinician used the following therapeutic modalities: Engagement Strategies, Client-centered Therapy and Supportive Psychotherapy    Substance Abuse was not addressed during this session. If the client is diagnosed with a co-occurring substance use disorder, please indicate any changes in the frequency or amount of use: NA. Stage of change for addressing substance use diagnoses: No substance use/Not applicable    ASSESSMENT:  Ariane Pablo presents with a Dysthymic mood. his affect is Normal range and intensity, which is congruent, with his mood and the content of the session. The client has not made progress on their goals. Ariane Pablo presents with a low risk of suicide, low risk of self-harm, and low risk of harm to others. Shawn Weston denies any SI, HI, self harm ideation, thoughts, urges within last 30 days. Risk factors: Major life event - potential divorce, History of suicide in family (bio father). For any risk assessment that surpasses a "low" rating, a safety plan must be developed. A safety plan was indicated: Yes, due to risk factors noted above  If yes, describe in detail NA    PLAN: Between sessions, Ariane Pablo will practice safe coping skills, crisis plan if/as needed. At the next session, the therapist will use Client-centered Therapy, Cognitive Behavioral Therapy, Mindfulness-based Strategies and Supportive Psychotherapy to improve coping.     Behavioral Health Treatment Plan and Discharge Planning: Ariane Pablo is aware of and agrees to continue to work on their treatment plan. They have identified and are working toward their discharge goals.  Yes     Visit start and stop times:    10/02/23

## 2023-10-04 DIAGNOSIS — F41.9 ANXIETY: ICD-10-CM

## 2023-10-04 RX ORDER — CLONAZEPAM 2 MG/1
2 TABLET ORAL 2 TIMES DAILY
Qty: 60 TABLET | Refills: 0 | Status: SHIPPED | OUTPATIENT
Start: 2023-10-04

## 2023-10-04 NOTE — TELEPHONE ENCOUNTER
Medication Refill Request     Name of Medication clonazePAM (KlonoPIN) 2 mg tablet  Dose/Frequency Take 1 tablet (2 mg total) by mouth 2 (two) times a day  Quantity 60  Verified pharmacy   [x]  Verified ordering Provider   [x]  Does patient have enough for the next 3 days? Yes [] No [x]  Does patient have a follow-up appointment scheduled?  Yes [x] No []   If so when is appointment: 10/23/23 @ 11 AM

## 2023-10-06 DIAGNOSIS — F41.1 GAD (GENERALIZED ANXIETY DISORDER): ICD-10-CM

## 2023-10-06 RX ORDER — QUETIAPINE FUMARATE 100 MG/1
200 TABLET, FILM COATED ORAL
Qty: 180 TABLET | Refills: 1 | Status: SHIPPED | OUTPATIENT
Start: 2023-10-06

## 2023-10-16 ENCOUNTER — SOCIAL WORK (OUTPATIENT)
Dept: BEHAVIORAL/MENTAL HEALTH CLINIC | Facility: CLINIC | Age: 63
End: 2023-10-16
Payer: COMMERCIAL

## 2023-10-16 DIAGNOSIS — F41.1 GAD (GENERALIZED ANXIETY DISORDER): ICD-10-CM

## 2023-10-16 DIAGNOSIS — F33.9 EPISODE OF RECURRENT MAJOR DEPRESSIVE DISORDER, UNSPECIFIED DEPRESSION EPISODE SEVERITY (HCC): Primary | ICD-10-CM

## 2023-10-16 PROCEDURE — 90837 PSYTX W PT 60 MINUTES: CPT | Performed by: SOCIAL WORKER

## 2023-10-17 NOTE — PSYCH
Behavioral Health Psychotherapy Progress Note    Psychotherapy Provided: Individual Psychotherapy     No diagnosis found. Goals addressed in session: Goal 1     DATA: Georgette Israel is a 70year old male with a history of depression, currently struggling with depression and anxiety related to marital problems and family issues. He and his life live in same home, her one one level of the home, he on another after an incident about a month ago during which he kicked out his son in law who he felt was a 'dead beat.'  His wife blame him and his step daughter won't talk with him. He relates his wife has an attitude of entitlement and enabling and takes for granted his financial contributions all these years and has different values and expectations of their taking in family to live with them when they are not in crisis. He is unsure of his rights, has been reading some and learned not to move out under any circumstances. He is considering going to a  to get more information on his rights, process. He relates for now things are cold, calm and distant 'but that's better then when they were constantly fighting.'  He worries a lot about money and what will happen if they divorce, 'this certainly isn't what I expected in snf.'  He denies any SI, HI, self harm. During this session, this clinician used the following therapeutic modalities: Client-centered Therapy and Supportive Psychotherapy    Also discussed was transfer of services to [place] due to writer leaving practice at end of month. Georgette Israel was amenable. Substance Abuse was not addressed during this session. If the client is diagnosed with a co-occurring substance use disorder, please indicate any changes in the frequency or amount of use: NA. Stage of change for addressing substance use diagnoses: No substance use/Not applicable    ASSESSMENT:  Audi Tang presents with a Euthymic/ normal and Dysthymic mood.      his affect is Normal range and intensity, which is congruent, with his mood and the content of the session. The client has made progress on their goals. Ramon Law is oriented x4, logical, linear thinking, memory intact, insight fair, judgment fair. Haylie Landis presents with a low risk of suicide, low risk of self-harm, and low risk of harm to others. For any risk assessment that surpasses a "low" rating, a safety plan must be developed. A safety plan was indicated: no  If yes, describe in detail NA    PLAN: Between sessions, Haylie Landis will engage in pro active safe coping strategies to manage anxiety, depression - get outside, work around home, reach out to friends, spend time with pet. Behavioral Health Treatment Plan and Discharge Planning: Haylie Landis is aware of and agrees to continue to work on their treatment plan. They have identified and are working toward their discharge goals. yes    Visit start and stop times:    10/17/23       . Patsy Arriaga [address] [place] [address]    Patient Name Haylie Landis     Date of Birth: 70 y.o. 1960      MRN: [id number]    Admission Date:  8/21/2023    Date of Transfer: October 17, 2023    Admission Diagnosis:     Generalized Anxiety Disorder    Current Diagnosis:     Recurrent Depression, unspecified  2. Generalized anxiety         Reason for Admission: Ramon Law presented for treatment due to depressive symptoms and anxiety symptoms. Primary complaints: lack of motivation, doesn't want to  get out of bed, loss of interest, self isolates, worries daily about money, divorce    Progress in Treatment: Ramon Law was seen for Individual Couseling. During the course of treatment he participated well. Initial stage of therapy.     Episodes of Higher Level of Care: No    Transfer request Initiated by: Psychiatrist: None Therapist: Yanet Escalera LCSW    Reason for Transfer Request: clinician leaving practice    Does this individual need a clinician with specialized training/expertise?: No    [facility] this client [facility]rapists?  Psychiatrist: Dr. Mckenzie Solis  Therapist: None    Other pertinent issues: None    Are there any specific individuals who would be a “best fit” or who have already agreed to accept this transfer request?      Psychiatrist: None   Therapist: Maritza Dominguez, [facility]  Rationale: Not Applicable    Attempts to maintain the current therapeutic relationship: Not Applicable    Transfer request routed to Clinical Supervisor for input and/or approval.     Comments from other involved providers and/or clinical coordinator : None    Neto Fletcher, JESSICA10/17/23

## 2023-10-18 ENCOUNTER — TELEPHONE (OUTPATIENT)
Dept: PSYCHIATRY | Facility: CLINIC | Age: 63
End: 2023-10-18

## 2023-10-23 ENCOUNTER — TELEPHONE (OUTPATIENT)
Dept: BEHAVIORAL/MENTAL HEALTH CLINIC | Facility: CLINIC | Age: 63
End: 2023-10-23

## 2023-10-23 NOTE — TELEPHONE ENCOUNTER
Patient is calling regarding cancelling an appointment.    Date/Time: 10/23/2023 11:00 am    Reason: none was given when asked    Patient was rescheduled: YES [] NO [x]  If yes, when was Patient reschedule for:     Patient requesting call back to reschedule: YES [] NO [x]

## 2023-10-25 ENCOUNTER — TELEPHONE (OUTPATIENT)
Dept: PSYCHIATRY | Facility: CLINIC | Age: 63
End: 2023-10-25

## 2023-11-01 DIAGNOSIS — F41.1 GAD (GENERALIZED ANXIETY DISORDER): ICD-10-CM

## 2023-11-01 DIAGNOSIS — F41.9 ANXIETY: ICD-10-CM

## 2023-11-01 RX ORDER — CLONAZEPAM 2 MG/1
2 TABLET ORAL 2 TIMES DAILY
Qty: 60 TABLET | Refills: 0 | Status: SHIPPED | OUTPATIENT
Start: 2023-11-01

## 2023-11-01 RX ORDER — QUETIAPINE FUMARATE 100 MG/1
200 TABLET, FILM COATED ORAL
Qty: 180 TABLET | Refills: 1 | Status: SHIPPED | OUTPATIENT
Start: 2023-11-01

## 2023-11-02 DIAGNOSIS — F41.9 ANXIETY: ICD-10-CM

## 2023-11-02 RX ORDER — QUETIAPINE FUMARATE 50 MG/1
50 TABLET, FILM COATED ORAL
Qty: 90 TABLET | Refills: 0 | Status: SHIPPED | OUTPATIENT
Start: 2023-11-02

## 2023-11-06 ENCOUNTER — OFFICE VISIT (OUTPATIENT)
Dept: PSYCHIATRY | Facility: CLINIC | Age: 63
End: 2023-11-06
Payer: COMMERCIAL

## 2023-11-06 VITALS
SYSTOLIC BLOOD PRESSURE: 149 MMHG | BODY MASS INDEX: 33.56 KG/M2 | HEART RATE: 86 BPM | HEIGHT: 70 IN | DIASTOLIC BLOOD PRESSURE: 89 MMHG | WEIGHT: 234.4 LBS

## 2023-11-06 DIAGNOSIS — F33.9 EPISODE OF RECURRENT MAJOR DEPRESSIVE DISORDER, UNSPECIFIED DEPRESSION EPISODE SEVERITY (HCC): Primary | ICD-10-CM

## 2023-11-06 DIAGNOSIS — F41.1 GAD (GENERALIZED ANXIETY DISORDER): ICD-10-CM

## 2023-11-06 PROCEDURE — 90833 PSYTX W PT W E/M 30 MIN: CPT | Performed by: NURSE PRACTITIONER

## 2023-11-06 PROCEDURE — 99214 OFFICE O/P EST MOD 30 MIN: CPT | Performed by: NURSE PRACTITIONER

## 2023-11-06 NOTE — PSYCH
Visit Time    Visit Start Time: 2:30  Visit Stop Time: 3:00  Total Visit Duration:  30 minutes    Subjective:     Patient ID: Audi Tang is a 70 y.o. male history of anxiety, depression, chronic pain seen for medication management and mood assessment. Georgette Israel reports his marriage is in conflict, stepchildren are against him and wife does not interact with him. He is frustrated, at this point it is unclear what he wants to do. Denies suicidal ideation. Reports appetite and sleep are good. Medication has helped his mood however with situations at home he "does not want to get out of bed". We discussed possible activities that may make him happy or diversion. Encouraged to continue therapy. Takes medication as prescribed. He has some cousins who are supportive.     HPI ROS Appetite Changes and Sleep: normal appetite and decreased energy    Review Of Systems:     Mood Anxiety and Depression   Behavior Normal    Thought Content Normal   General Relationship Problems and Emotional Problems   Personality Normal   Other Psych Symptoms Normal   Constitutional As Noted in HPI   ENT As Noted in HPI   Cardiovascular As Noted in HPI   Respiratory As Noted in HPI   Gastrointestinal As Noted in HPI   Genitourinary As Noted in HPI   Musculoskeletal As Noted in HPI   Integumentary As Noted in HPI   Neurological As Noted in HPI   Endocrine Normal    Other Symptoms Normal        Laboratory Results:     Substance Abuse History:  Social History     Substance and Sexual Activity   Drug Use No       Family Psychiatric History:   Family History   Problem Relation Age of Onset    Suicide Attempts Father     Depression Father     Hyperlipidemia Maternal Aunt     Stroke Maternal Aunt     Cervical cancer Family        The following portions of the patient's history were reviewed and updated as appropriate: allergies, current medications, past family history, past medical history, past social history, past surgical history, and problem list.    Social History     Socioeconomic History    Marital status: /Civil Union     Spouse name: Not on file    Number of children: Not on file    Years of education: Not on file    Highest education level: Not on file   Occupational History    Occupation: retired     Tobacco Use    Smoking status: Former    Smokeless tobacco: Never    Tobacco comments:     quit about 1-2 years ago.      Substance and Sexual Activity    Alcohol use: Yes     Comment: rarely; social    Drug use: No    Sexual activity: Not Currently   Other Topics Concern    Not on file   Social History Narrative    Daily coffee consumption     Social Determinants of Health     Financial Resource Strain: Not on file   Food Insecurity: Not on file   Transportation Needs: Not on file   Physical Activity: Not on file   Stress: Not on file   Social Connections: Not on file   Intimate Partner Violence: Not on file   Housing Stability: Not on file     Social History     Social History Narrative    Daily coffee consumption       Objective:       Mental status:  Appearance calm and cooperative , adequate hygiene and grooming, and good eye contact    Mood depressed and anxious   Affect affect appropriate    Speech a normal rate   Thought Processes normal thought processes   Hallucinations no hallucinations present    Thought Content no delusions   Abnormal Thoughts no suicidal thoughts  and no homicidal thoughts    Orientation  oriented to person and place and time   Remote Memory short term memory intact and long term memory intact   Attention Span concentration intact   Intellect Appears to be of Average Intelligence   Insight Insight intact   Judgement judgment was intact   Muscle Strength Muscle strength and tone were normal   Language no difficulty naming common objects   Fund of Knowledge displays adequate knowledge of current events   Pain moderate to severe   Pain Scale 5       Assessment/Plan:       Diagnoses and all orders for this visit:    Episode of recurrent major depressive disorder, unspecified depression episode severity ([address])    OSITO (generalized anxiety disorder)            Treatment Recommendations- Risks Benefits      Immediate Medical/Psychiatric/Psychotherapy Treatments and Any Precautions: Continue with treatment plan    Risks, Benefits And Possible Side Effects Of Medications:  {PSYCH RISK, BENEFITS AND POSSIBLE SIDE EFFECTS (Optional):12635    Controlled Medication Discussion: He is aware of safe use and storage of medication    Psychotherapy Provided: 30 min Individual psychotherapy provided.    Supportive therapy  Medication evaluation  Mood assessment  Coping skills regarding home conflict  Encouraged to find something he enjoys outside of the home    Goals discussed in session: Stable mood

## 2023-11-07 ENCOUNTER — DOCUMENTATION (OUTPATIENT)
Dept: PSYCHIATRY | Facility: CLINIC | Age: 63
End: 2023-11-07

## 2023-11-07 DIAGNOSIS — F41.1 GAD (GENERALIZED ANXIETY DISORDER): ICD-10-CM

## 2023-11-07 DIAGNOSIS — F33.9 EPISODE OF RECURRENT MAJOR DEPRESSIVE DISORDER, UNSPECIFIED DEPRESSION EPISODE SEVERITY (HCC): Primary | ICD-10-CM

## 2023-11-07 NOTE — PROGRESS NOTES
Psychotherapy Discharge Summary    Preferred Name: Jose G Odell  YOB: 1960    Admission date to psychotherapy: 8/21/23    Referred by: Petra Candelaria    Presenting Problem: depression, anxiety    Course of treatment included : individual therapy     Progress/Outcome of Treatment Goals (brief summary of course of treatment) Clt declined pursing therapy with this writer after referral    Treatment Complications (if any): NA    Treatment Progress:  unknown, no contact made with clt    Current [facility] Psychiatric Provider: Dr. Kailee Childress    Discharge Medications include:     Discharge Date: 11/7/23    Discharge Diagnosis:   1. Episode of recurrent major depressive disorder, unspecified depression episode severity ([address])        2.  OSITO (generalized anxiety disorder)            Criteria for Discharge:  client declined individual therapy    Aftercare recommendations include (include specific referral names and phone numbers, if appropriate):     Prognosis:  unknown

## 2023-11-14 DIAGNOSIS — F33.1 MODERATE EPISODE OF RECURRENT MAJOR DEPRESSIVE DISORDER (HCC): ICD-10-CM

## 2023-11-14 DIAGNOSIS — F41.1 GAD (GENERALIZED ANXIETY DISORDER): ICD-10-CM

## 2023-11-14 RX ORDER — DULOXETIN HYDROCHLORIDE 20 MG/1
40 CAPSULE, DELAYED RELEASE ORAL 2 TIMES DAILY
Qty: 360 CAPSULE | Refills: 2 | Status: SHIPPED | OUTPATIENT
Start: 2023-11-14

## 2023-11-14 NOTE — TELEPHONE ENCOUNTER
Medication Refill Request     Name of Medication DULoxetine (CYMBALTA) 20 mg capsule   Dose/Frequency Take 2 capsules (40 mg total) by mouth 2 (two) times a day   Quantity 360  Verified pharmacy   [x]  Verified ordering Provider   [x]  Does patient have enough for the next 3 days? Yes [] No [x]  Does patient have a follow-up appointment scheduled?  Yes [x] No []   If so when is appointment: 02/06/24 @ 2 pm

## 2023-11-30 DIAGNOSIS — F41.9 ANXIETY: ICD-10-CM

## 2023-11-30 NOTE — TELEPHONE ENCOUNTER
Medication Refill Request     Name of Medication Klonopin 2 mg  Dose/Frequency 1bid  Quantity 30  Verified pharmacy   [x]  Verified ordering Provider   [x]  Does patient have enough for the next 3 days? Yes [] No [x]  Does patient have a follow-up appointment scheduled?  Yes [x] No []   If so when is appointment: 2/6/2023

## 2023-12-01 RX ORDER — CLONAZEPAM 2 MG/1
2 TABLET ORAL 2 TIMES DAILY
Qty: 60 TABLET | Refills: 0 | Status: SHIPPED | OUTPATIENT
Start: 2023-12-01

## 2023-12-27 DIAGNOSIS — F41.9 ANXIETY: ICD-10-CM

## 2023-12-27 RX ORDER — CLONAZEPAM 2 MG/1
2 TABLET ORAL 2 TIMES DAILY
Qty: 60 TABLET | Refills: 0 | Status: SHIPPED | OUTPATIENT
Start: 2023-12-27

## 2024-01-05 ENCOUNTER — TELEPHONE (OUTPATIENT)
Dept: PSYCHIATRY | Facility: CLINIC | Age: 64
End: 2024-01-05

## 2024-01-11 DIAGNOSIS — F41.9 ANXIETY: ICD-10-CM

## 2024-01-11 RX ORDER — QUETIAPINE FUMARATE 50 MG/1
50 TABLET, FILM COATED ORAL
Qty: 90 TABLET | Refills: 0 | Status: SHIPPED | OUTPATIENT
Start: 2024-01-11

## 2024-01-11 NOTE — TELEPHONE ENCOUNTER
Medication Refill Request     Patient will be out of medication--Dr Emily London out of office until Monday.    Takes both strengths Seroquel.    Name of Medication Seroquel 50 mg  Dose/Frequency 1qd after breakfast  Quantity 90  Verified pharmacy   [x]  Verified ordering Provider   [x]  Does patient have enough for the next 3 days? Yes [] No [x]  Does patient have a follow-up appointment scheduled? Yes [x] No []   If so when is appointment: 2/6/2024 Dr Emily London

## 2024-01-30 DIAGNOSIS — F41.9 ANXIETY: ICD-10-CM

## 2024-01-30 RX ORDER — CLONAZEPAM 2 MG/1
2 TABLET ORAL 2 TIMES DAILY
Qty: 60 TABLET | Refills: 0 | Status: SHIPPED | OUTPATIENT
Start: 2024-01-30

## 2024-01-30 NOTE — TELEPHONE ENCOUNTER
Medication Refill Request     Name of Medication Klonopin 2 mg  Dose/Frequency 1bid  Quantity 60  Verified pharmacy   [x]  Verified ordering Provider   [x]  Does patient have enough for the next 3 days? Yes [] No []  Does patient have a follow-up appointment scheduled? Yes [x] No []   If so when is appointment: 2/6/2024

## 2024-02-05 ENCOUNTER — TELEPHONE (OUTPATIENT)
Dept: PSYCHIATRY | Facility: CLINIC | Age: 64
End: 2024-02-05

## 2024-02-05 NOTE — TELEPHONE ENCOUNTER
Patient is calling regarding cancelling an appointment.    Date/Time: 02/06/24 @ 2 pm    Reason: Virous Infection     Patient was rescheduled: YES [x] NO []  If yes, when was Patient reschedule for: 02/27/24 @ 3 pm    Patient requesting call back to reschedule: YES [] NO [x]

## 2024-02-11 DIAGNOSIS — F41.1 GAD (GENERALIZED ANXIETY DISORDER): ICD-10-CM

## 2024-02-11 RX ORDER — BACLOFEN 10 MG/1
10 TABLET ORAL 2 TIMES DAILY PRN
Qty: 180 TABLET | Refills: 1 | Status: SHIPPED | OUTPATIENT
Start: 2024-02-11

## 2024-02-14 DIAGNOSIS — F41.1 GAD (GENERALIZED ANXIETY DISORDER): ICD-10-CM

## 2024-02-14 DIAGNOSIS — F33.1 MODERATE EPISODE OF RECURRENT MAJOR DEPRESSIVE DISORDER (HCC): ICD-10-CM

## 2024-02-14 DIAGNOSIS — F41.9 ANXIETY: ICD-10-CM

## 2024-02-14 DIAGNOSIS — G47.00 INSOMNIA, UNSPECIFIED TYPE: ICD-10-CM

## 2024-02-14 RX ORDER — TRAZODONE HYDROCHLORIDE 300 MG/1
300 TABLET ORAL
Qty: 90 TABLET | Refills: 0 | Status: SHIPPED | OUTPATIENT
Start: 2024-02-14

## 2024-02-14 RX ORDER — DULOXETIN HYDROCHLORIDE 20 MG/1
40 CAPSULE, DELAYED RELEASE ORAL 2 TIMES DAILY
Qty: 360 CAPSULE | Refills: 2 | Status: SHIPPED | OUTPATIENT
Start: 2024-02-14

## 2024-02-27 DIAGNOSIS — F41.9 ANXIETY: ICD-10-CM

## 2024-02-27 RX ORDER — CLONAZEPAM 2 MG/1
2 TABLET ORAL 2 TIMES DAILY
Qty: 60 TABLET | Refills: 0 | Status: SHIPPED | OUTPATIENT
Start: 2024-02-27

## 2024-02-27 NOTE — TELEPHONE ENCOUNTER
Medication Refill Request     Name of Medication Klonopin 2 mg  Dose/Frequency 1bid  Quantity 60  Verified pharmacy   [x]  Verified ordering Provider   [x]  Does patient have enough for the next 3 days? Yes [x] No []  Does patient have a follow-up appointment scheduled? Yes [x] No []   If so when is appointment: 4/2/2024

## 2024-02-27 NOTE — TELEPHONE ENCOUNTER
Patient is calling regarding cancelling an appointment.    Date/Time: 2/27/2024    Reason: Patient has a migraine.  Patient can't do virtual in [place].    Patient was rescheduled: YES [x] NO []  If yes, when was Patient reschedule for: 4/2/2024    Patient requesting call back to reschedule: YES [] NO [x]

## 2024-03-13 DIAGNOSIS — F41.9 ANXIETY: ICD-10-CM

## 2024-03-13 RX ORDER — QUETIAPINE FUMARATE 50 MG/1
50 TABLET, FILM COATED ORAL
Qty: 90 TABLET | Refills: 0 | Status: SHIPPED | OUTPATIENT
Start: 2024-03-13

## 2024-03-29 DIAGNOSIS — F41.9 ANXIETY: ICD-10-CM

## 2024-03-29 NOTE — TELEPHONE ENCOUNTER
Patient have enough until Tuesday      Medication Refill Request     Name of Medication  clonazePAM (KlonoPIN) 2 mg tablet   Dose/Frequency      Take 1 tablet (2 mg total) by mouth 2 (two) times a day     Quantity 60  Verified pharmacy   [x]  Verified ordering Provider   [x]  Does patient have enough for the next 3 days? Yes [x] No []  Does patient have a follow-up appointment scheduled? Yes [x] No []   If so when is appointment: 04/02/24 @ 2 pm

## 2024-03-31 RX ORDER — CLONAZEPAM 2 MG/1
2 TABLET ORAL 2 TIMES DAILY
Qty: 60 TABLET | Refills: 0 | Status: SHIPPED | OUTPATIENT
Start: 2024-03-31

## 2024-04-02 ENCOUNTER — HOSPITAL ENCOUNTER (EMERGENCY)
Facility: HOSPITAL | Age: 64
Discharge: HOME | End: 2024-04-02
Attending: EMERGENCY MEDICINE
Payer: COMMERCIAL

## 2024-04-02 ENCOUNTER — APPOINTMENT (EMERGENCY)
Dept: RADIOLOGY | Facility: HOSPITAL | Age: 64
End: 2024-04-02
Payer: COMMERCIAL

## 2024-04-02 ENCOUNTER — TELEPHONE (OUTPATIENT)
Dept: PSYCHIATRY | Facility: CLINIC | Age: 64
End: 2024-04-02

## 2024-04-02 VITALS
BODY MASS INDEX: 32.84 KG/M2 | TEMPERATURE: 98.8 F | SYSTOLIC BLOOD PRESSURE: 127 MMHG | WEIGHT: 229.4 LBS | RESPIRATION RATE: 18 BRPM | HEIGHT: 70 IN | OXYGEN SATURATION: 95 % | DIASTOLIC BLOOD PRESSURE: 95 MMHG | HEART RATE: 100 BPM

## 2024-04-02 DIAGNOSIS — S92.901A FOOT FRACTURE, RIGHT, CLOSED, INITIAL ENCOUNTER: Primary | ICD-10-CM

## 2024-04-02 PROCEDURE — 99283 EMERGENCY DEPT VISIT LOW MDM: CPT | Performed by: EMERGENCY MEDICINE

## 2024-04-02 PROCEDURE — 73630 X-RAY EXAM OF FOOT: CPT

## 2024-04-02 PROCEDURE — 73564 X-RAY EXAM KNEE 4 OR MORE: CPT

## 2024-04-02 PROCEDURE — 99284 EMERGENCY DEPT VISIT MOD MDM: CPT

## 2024-04-02 NOTE — Clinical Note
Juan José Corrales was seen and treated in our emergency department on 4/2/2024.                Diagnosis:     Juan José  may return to work on return date.    He may return on this date: 04/06/2024         If you have any questions or concerns, please don't hesitate to call.      Jerel Saldaña, DO    ______________________________           _______________          _______________  Hospital Representative                              Date                                Time

## 2024-04-02 NOTE — TELEPHONE ENCOUNTER
Patient's wife is calling regarding cancelling an appointment.    Date/Time: 4/2/2024    Reason: Pt thinks he broke foot going to hospital.    Patient was rescheduled: YES [x] NO []  If yes, when was Patient reschedule for: 5/22/2024    Patient requesting call back to reschedule: YES [] NO [x]

## 2024-04-02 NOTE — ED PROVIDER NOTES
History  Chief Complaint   Patient presents with    Fall     Pt states last night he slipped and fell no LOC no headstrike no thinners belives he knee gave out.  L Knee and R foot and ankle currently swollen     63-year-old male states he slipped and fell last night no loss of consciousness not on any anticoagulation patient complaining of some pain in the right foot and the left knee.  Left knee has been working okay however he states he is not able to bear weight on the right foot.  No other noticeable injuries patient is awake and alert does have some swelling to that foot on the right.        Prior to Admission Medications   Prescriptions Last Dose Informant Patient Reported? Taking?   DULoxetine (CYMBALTA) 20 mg capsule 4/2/2024  No Yes   Sig: Take 2 capsules (40 mg total) by mouth 2 (two) times a day   QUEtiapine (SEROquel) 100 mg tablet 4/2/2024  No Yes   Sig: Take 2 tablets (200 mg total) by mouth daily at bedtime   QUEtiapine (SEROquel) 50 mg tablet 4/2/2024  No Yes   Sig: Take 1 tablet (50 mg total) by mouth daily after breakfast   baclofen 10 mg tablet 4/1/2024  No Yes   Sig: TAKE 1 TABLET (10 MG TOTAL) BY MOUTH 2 (TWO) TIMES A DAY AS NEEDED FOR MUSCLE SPASMS (AND ANXIETY)   clonazePAM (KlonoPIN) 2 mg tablet 4/2/2024  No Yes   Sig: Take 1 tablet (2 mg total) by mouth 2 (two) times a day   ergocalciferol (VITAMIN D2) 50,000 units 4/2/2024  No Yes   Sig: Take 1 capsule (50,000 Units total) by mouth once a week   tobramycin-dexamethasone (TOBRADEX) ophthalmic suspension   No No   Sig: Administer 1 drop to both eyes every 4 (four) hours while awake   Patient not taking: Reported on 11/22/2022   traZODone (DESYREL) 300 MG tablet 4/1/2024  No Yes   Sig: Take 1 tablet (300 mg total) by mouth daily at bedtime   vitamin A 2250 MCG (7500 UT) capsule 4/2/2024  Yes Yes   Sig: Take 7,500 Units by mouth daily      Facility-Administered Medications: None       Past Medical History:   Diagnosis Date    Anxiety     BPH  (benign prostatic hyperplasia)     Depressed     Hayfever     Inguinal hernia     last assessed: 1/21/16    Mild depressive disorder     last assessed: 2/24/16    Suicide attempt (HCC)        Past Surgical History:   Procedure Laterality Date    HERNIA REPAIR Bilateral     5 months ago.     KNEE ARTHROSCOPY Left     Possible ACL repair    KNEE SURGERY      ROTATOR CUFF REPAIR Left     about 1 year ago       Family History   Problem Relation Age of Onset    Suicide Attempts Father     Depression Father     Hyperlipidemia Maternal Aunt     Stroke Maternal Aunt     Cervical cancer Family      I have reviewed and agree with the history as documented.    E-Cigarette/Vaping     E-Cigarette/Vaping Substances     Social History     Tobacco Use    Smoking status: Former    Smokeless tobacco: Never    Tobacco comments:     quit about 1-2 years ago.     Substance Use Topics    Alcohol use: Yes     Comment: rarely; social    Drug use: Yes     Types: Marijuana     Comment: soically       Review of Systems   Constitutional:  Negative for activity change, chills, diaphoresis and fever.   HENT:  Negative for congestion, ear pain, nosebleeds, sore throat, trouble swallowing and voice change.    Eyes:  Negative for pain, discharge and redness.   Respiratory:  Negative for apnea, cough, choking, shortness of breath, wheezing and stridor.    Cardiovascular:  Negative for chest pain and palpitations.   Gastrointestinal:  Negative for abdominal distention, abdominal pain, constipation, diarrhea, nausea and vomiting.   Endocrine: Negative for polydipsia.   Genitourinary:  Negative for difficulty urinating, dysuria, flank pain, frequency, hematuria and urgency.   Musculoskeletal:  Positive for arthralgias, gait problem and joint swelling. Negative for back pain, myalgias, neck pain and neck stiffness.   Skin:  Negative for pallor and rash.   Neurological:  Negative for dizziness, tremors, syncope, speech difficulty, weakness, numbness and  headaches.   Hematological:  Negative for adenopathy.   Psychiatric/Behavioral:  Negative for confusion, hallucinations, self-injury and suicidal ideas. The patient is not nervous/anxious.        Physical Exam  Physical Exam  Vitals and nursing note reviewed.   Constitutional:       General: He is not in acute distress.     Appearance: He is well-developed. He is not diaphoretic.   HENT:      Head: Normocephalic and atraumatic.      Right Ear: External ear normal.      Left Ear: External ear normal.      Nose: Nose normal.   Eyes:      Conjunctiva/sclera: Conjunctivae normal.      Pupils: Pupils are equal, round, and reactive to light.   Cardiovascular:      Rate and Rhythm: Normal rate and regular rhythm.      Heart sounds: Normal heart sounds.   Pulmonary:      Effort: Pulmonary effort is normal.      Breath sounds: Normal breath sounds.   Abdominal:      General: Bowel sounds are normal.      Palpations: Abdomen is soft.      Tenderness: There is abdominal tenderness.      Comments: Diffuse tenderness   Musculoskeletal:         General: Swelling, tenderness and signs of injury present. Normal range of motion.      Cervical back: Normal range of motion and neck supple.   Skin:     General: Skin is warm and dry.   Neurological:      Mental Status: He is alert and oriented to person, place, and time.      Deep Tendon Reflexes: Reflexes are normal and symmetric.   Psychiatric:         Behavior: Behavior is cooperative.         Vital Signs  ED Triage Vitals [04/02/24 1213]   Temperature Pulse Respirations Blood Pressure SpO2   98.8 °F (37.1 °C) 100 18 127/95 95 %      Temp Source Heart Rate Source Patient Position - Orthostatic VS BP Location FiO2 (%)   Oral Monitor Lying Right arm --      Pain Score       9           Vitals:    04/02/24 1213   BP: 127/95   Pulse: 100   Patient Position - Orthostatic VS: Lying         Visual Acuity      ED Medications  Medications - No data to display    Diagnostic Studies  Results  Reviewed       None                   XR foot 3+ views RIGHT   Final Result by Panfilo Reynoso MD (04/02 1702)      Findings suspicious for fractures of the medial cuneiform and bases of the first and possibly second metatarsals. Consider follow-up CT or MRI of the foot for further evaluation.      The study was marked in EPIC for immediate notification.      Workstation performed: [id number]         XR knee 4+ views left injury   Final Result by Panfilo Reynoso MD (04/02 1700)      No acute osseous abnormality.            Workstation performed: [id number]                    Procedures  Procedures         ED Course                               SBIRT 20yo+      Flowsheet Row Most Recent Value   Initial Alcohol Screen: US AUDIT-C     1. How often do you have a drink containing alcohol? 0 Filed at: 04/02/2024 1216   2. How many drinks containing alcohol do you have on a typical day you are drinking?  0 Filed at: 04/02/2024 1216   3a. Male UNDER 65: How often do you have five or more drinks on one occasion? 0 Filed at: 04/02/2024 1216   Audit-C Score 0 Filed at: 04/02/2024 1216   MAXIMINO: How many times in the past year have you...    Used an illegal drug or used a prescription medication for non-medical reasons? Never Filed at: 04/02/2024 1216                      Medical Decision Making  I called the patient to let them know that the radiologist did read the xray as a fracture also and to remain non weight bearing and followup with orthopedics tomorrow for appointment.     Amount and/or Complexity of Data Reviewed  Radiology: ordered.             Disposition  Final diagnoses:   Foot fracture, right, closed, initial encounter     Time reflects when diagnosis was documented in both MDM as applicable and the Disposition within this note       Time User Action Codes Description Comment    4/2/2024  2:04 PM Jerel aSldaña Add [S92.901A] Foot fracture, right, closed, initial encounter           ED  Disposition       ED Disposition   Discharge    Condition   Stable    Date/Time   Tue Apr 2, 2024 1403    Comment   Juan José Corrales discharge to home/self care.                   Follow-up Information       Follow up With Specialties Details Why Contact Info    Chidi Dunbar MD Orthopedic Surgery Schedule an appointment as soon as possible for a visit  As needed [address], [address]  [place] [address] [phone]              Discharge Medication List as of 4/2/2024  2:06 PM        CONTINUE these medications which have NOT CHANGED    Details   baclofen 10 mg tablet TAKE 1 TABLET (10 MG TOTAL) BY MOUTH 2 (TWO) TIMES A DAY AS NEEDED FOR MUSCLE SPASMS (AND ANXIETY), Starting Sun 2/11/2024, Normal      clonazePAM (KlonoPIN) 2 mg tablet Take 1 tablet (2 mg total) by mouth 2 (two) times a day, Starting Sun 3/31/2024, Normal      DULoxetine (CYMBALTA) 20 mg capsule Take 2 capsules (40 mg total) by mouth 2 (two) times a day, Starting Wed 2/14/2024, Normal      ergocalciferol (VITAMIN D2) 50,000 units Take 1 capsule (50,000 Units total) by mouth once a week, Starting Thu 1/28/2021, Normal      !! QUEtiapine (SEROquel) 100 mg tablet Take 2 tablets (200 mg total) by mouth daily at bedtime, Starting Wed 11/1/2023, Normal      !! QUEtiapine (SEROquel) 50 mg tablet Take 1 tablet (50 mg total) by mouth daily after breakfast, Starting Wed 3/13/2024, Normal      traZODone (DESYREL) 300 MG tablet Take 1 tablet (300 mg total) by mouth daily at bedtime, Starting Wed 2/14/2024, Normal      vitamin A 2250 MCG (7500 UT) capsule Take 7,500 Units by mouth daily, Historical Med      tobramycin-dexamethasone (TOBRADEX) ophthalmic suspension Administer 1 drop to both eyes every 4 (four) hours while awake, Starting Tue 7/26/2022, Normal       !! - Potential duplicate medications found. Please discuss with provider.          No discharge procedures on file.    PDMP Review         Value Time User     PDMP Reviewed  Yes 3/31/2024  8:08 AM Emily London, PhD            ED Provider  Electronically Signed by             Jerel Saldaña,   04/02/24 1549       Jerel Saldaña,   04/02/24 1550       Jerel Saldaña, DO  04/02/24 1752

## 2024-04-03 ENCOUNTER — TELEPHONE (OUTPATIENT)
Age: 64
End: 2024-04-03

## 2024-04-03 NOTE — TELEPHONE ENCOUNTER
Patient was not happy to wait until Saturday for an appointment. I did lvm asking that he call back to schedule today or tomorrow with Adiel Castano.

## 2024-04-04 ENCOUNTER — TELEPHONE (OUTPATIENT)
Age: 64
End: 2024-04-04

## 2024-04-04 ENCOUNTER — OFFICE VISIT (OUTPATIENT)
Dept: OBGYN CLINIC | Facility: CLINIC | Age: 64
End: 2024-04-04
Payer: COMMERCIAL

## 2024-04-04 ENCOUNTER — HOSPITAL ENCOUNTER (OUTPATIENT)
Dept: MRI IMAGING | Facility: HOSPITAL | Age: 64
Discharge: HOME | End: 2024-04-04
Payer: COMMERCIAL

## 2024-04-04 VITALS — HEART RATE: 99 BPM | HEIGHT: 70 IN | BODY MASS INDEX: 32.92 KG/M2 | OXYGEN SATURATION: 95 %

## 2024-04-04 DIAGNOSIS — S92.244A NONDISPLACED FRACTURE OF MEDIAL CUNEIFORM OF RIGHT FOOT, INITIAL ENCOUNTER FOR CLOSED FRACTURE: Primary | ICD-10-CM

## 2024-04-04 DIAGNOSIS — S92.324A CLOSED NONDISPLACED FRACTURE OF SECOND METATARSAL BONE OF RIGHT FOOT, INITIAL ENCOUNTER: ICD-10-CM

## 2024-04-04 DIAGNOSIS — F41.9 ANXIETY: ICD-10-CM

## 2024-04-04 DIAGNOSIS — S92.244A NONDISPLACED FRACTURE OF MEDIAL CUNEIFORM OF RIGHT FOOT, INITIAL ENCOUNTER FOR CLOSED FRACTURE: ICD-10-CM

## 2024-04-04 PROCEDURE — 73718 MRI LOWER EXTREMITY W/O DYE: CPT

## 2024-04-04 PROCEDURE — 99204 OFFICE O/P NEW MOD 45 MIN: CPT | Performed by: PHYSICIAN ASSISTANT

## 2024-04-04 RX ORDER — QUETIAPINE FUMARATE 50 MG/1
50 TABLET, FILM COATED ORAL
Qty: 90 TABLET | Refills: 0 | Status: SHIPPED | OUTPATIENT
Start: 2024-04-04

## 2024-04-04 NOTE — TELEPHONE ENCOUNTER
Called and spoke w/pt and relayed MICKY Castano's msg in detail.  Pt verbalized info back in understanding.  Reminded pt of appt w/podiatry on 4/11/24.  Pt will also let his wife know.  No further questions/conerns.

## 2024-04-04 NOTE — TELEPHONE ENCOUNTER
Caller: Patients wife Emily    Doctor: Adiel Castano    Reason for call: Patients wife Emily is calling wanting to know based on the MRI results should be be doing anything else besides wearing the boot?    Call back#: [phone]

## 2024-04-04 NOTE — PROGRESS NOTES
Assessment/Plan   Diagnoses and all orders for this visit:    Nondisplaced fracture of medial cuneiform of right foot      Nondisplaced fracture of second metatarsal bone of right foot    -     Cam Boot  -     Remain non-weightbearing on crutches  -     MRI attn cuneiforms, MT bases, lisfranc ligament  -     Follow up with Dr. Saúl Robles   Patient ID: Juan José Corrales is a 63 y.o. male.    Vitals:    04/04/24 1059   Pulse: 99   SpO2: 95%     64yo male comes in for an evaluation of his right foot.  He was injured on 4/1/24 when he slipped and fell.  Xrays in the ED demonstrated fractures of the medial cuneiform and 2nd MT base.  He is non-weightbearing on crutches.  The pain is dull in character, mild in severity, does not radiate and is not associated with numbness.          The following portions of the patient's history were reviewed and updated as appropriate: allergies, current medications, past family history, past medical history, past social history, past surgical history, and problem list.    Review of Systems  Ortho Exam  Past Medical History:   Diagnosis Date    Anxiety     BPH (benign prostatic hyperplasia)     Depressed     Hayfever     Inguinal hernia     last assessed: 1/21/16    Mild depressive disorder     last assessed: 2/24/16    Suicide attempt (HCC)      Past Surgical History:   Procedure Laterality Date    HERNIA REPAIR Bilateral     5 months ago.     KNEE ARTHROSCOPY Left     Possible ACL repair    KNEE SURGERY      ROTATOR CUFF REPAIR Left     about 1 year ago     Family History   Problem Relation Age of Onset    Suicide Attempts Father     Depression Father     Hyperlipidemia Maternal Aunt     Stroke Maternal Aunt     Cervical cancer Family      Social History     Occupational History    Occupation: retired     Tobacco Use    Smoking status: Former    Smokeless tobacco: Never    Tobacco comments:     quit about 1-2 years ago.     Substance and Sexual Activity     Alcohol use: Yes     Comment: rarely; social    Drug use: Yes     Types: Marijuana     Comment: soically    Sexual activity: Not Currently       Review of Systems   Constitutional: Negative.    HENT: Negative.    Eyes: Negative.    Respiratory: Negative.    Cardiovascular: Negative.    Gastrointestinal: Negative.    Endocrine: Negative.    Genitourinary: Negative.    Musculoskeletal: As below..   Allergic/Immunologic: Negative.    Neurological: Negative.    Hematological: Negative.    Psychiatric/Behavioral: Negative.          Objective   Physical Exam      Xray:  I have personally reviewed pertinent films in PACS and my interpretation is nd fractures of the medial cuneiform and 2nd mt base. Suspected lisfranc injury.      Constitutional: Awake, Alert, Oriented  Eyes: EOMI  Psych: Mood and affect appropriate  Heart: regular rate   Lungs: No audible wheezing  Abdomen: No guarding  Lymph: no lymphedema            right foot:  Appearance  Swelling: moderate and ecchymosis: of the lateral heel and dorsal MTPs  Palpation  + dorsal and medial midfoot tenderness  ROM  not examined due to fracture status  Special Tests    Motor  limited by pain  NVI distally

## 2024-04-11 ENCOUNTER — OFFICE VISIT (OUTPATIENT)
Dept: PODIATRY | Facility: CLINIC | Age: 64
End: 2024-04-11
Payer: COMMERCIAL

## 2024-04-11 VITALS
HEART RATE: 86 BPM | OXYGEN SATURATION: 95 % | DIASTOLIC BLOOD PRESSURE: 99 MMHG | HEIGHT: 70 IN | BODY MASS INDEX: 32.92 KG/M2 | SYSTOLIC BLOOD PRESSURE: 143 MMHG

## 2024-04-11 DIAGNOSIS — S92.324A CLOSED NONDISPLACED FRACTURE OF SECOND METATARSAL BONE OF RIGHT FOOT, INITIAL ENCOUNTER: ICD-10-CM

## 2024-04-11 DIAGNOSIS — S92.244A NONDISPLACED FRACTURE OF MEDIAL CUNEIFORM OF RIGHT FOOT, INITIAL ENCOUNTER FOR CLOSED FRACTURE: ICD-10-CM

## 2024-04-11 PROCEDURE — 99203 OFFICE O/P NEW LOW 30 MIN: CPT | Performed by: PODIATRIST

## 2024-04-11 NOTE — PROGRESS NOTES
Assessment/Plan:     The patient's clinical examination today significant for mild to moderate diffuse edema of the dorsal right midfoot and forefoot.  There is some areas of resolving ecchymosis to the midfoot and hindfoot as well.  There are no open lesions.  Neurovascular status intact.  Pedal pulses are palpable.    MRI images were personally reviewed and interpreted.  Findings were significant for edema within the interosseous component of the Lisfranc joint.  There is a small bony fragment at the lateral aspect of the medial cuneiform consistent with an avulsion injury.  Edema was also noted within the plantar and dorsal components of the Lisfranc ligament consistent with sprain.  Alignment of Lisfranc's joint is preserved.  The official report report was appreciated.    X-rays of the patient's right foot taken on April 2, 2024 were also reviewed interpreted.  Findings were consistent with fractures of the medial cuneiform and avulsion injuries of the Lisfranc's joint.    The patient is doing fairly well from a clinical standpoint.  His pain is relatively mild in nature.  He is tolerating the cam boot well and may attempt to transition to single crutch walking with the boot on.  As his pain allows, he can discontinue use of the crutches altogether and continue with guarded weightbearing in a cam boot.  I anticipate total time in the cam boot to be about 6 weeks.  Will repeat x-rays with weightbearing films in 2 to 3 weeks on follow-up.  If the patient fails to show progression healing or notices increasing pain, we can consider percutaneous reduction of the Lisfranc's joint.    His pain is currently under good control.  He may use OTC NSAID therapy or acetaminophen on an as-needed basis.  Follow-up in 2 to 3 weeks for weightbearing x-rays of the right foot.     Diagnoses and all orders for this visit:    Nondisplaced fracture of medial cuneiform of right foot, initial encounter for closed fracture  -      Ambulatory Referral to Podiatry    Closed nondisplaced fracture of second metatarsal bone of right foot, initial encounter  -     Ambulatory Referral to Podiatry          Subjective:     Patient ID: Juan José Corrales is a 63 y.o. male.    The patient presents today for his initial consultation with [facility] podiatry with a chief complaint of a right foot fracture sustained on April 1 when the patient slipped and fell at home in the bathroom.  He noticed persistent pain and swelling in his right foot the next morning and went to the local ED.  X-rays at that point noted injury to the Lisfranc's joint.  He subsequently underwent an MRI after being evaluated by orthopedics.  He was then subsequently referred to podiatry for further evaluation and management and review his MRI results.  He is currently utilizing 2 crutches for with guarded weightbearing in a cam boot to the right lower extremity.  He is tolerating it well.  His pain is relatively mild.  He occasionally takes Advil on an as-needed basis.      PAST MEDICAL HISTORY:  Past Medical History:   Diagnosis Date    Anxiety     BPH (benign prostatic hyperplasia)     Depressed     Hayfever     Inguinal hernia     last assessed: 1/21/16    Mild depressive disorder     last assessed: 2/24/16    Suicide attempt (HCC)        PAST SURGICAL HISTORY:  Past Surgical History:   Procedure Laterality Date    HERNIA REPAIR Bilateral     5 months ago.     KNEE ARTHROSCOPY Left     Possible ACL repair    KNEE SURGERY      ROTATOR CUFF REPAIR Left     about 1 year ago        ALLERGIES:  Amoxicillin, Motrin [ibuprofen], and Other    MEDICATIONS:  Current Outpatient Medications   Medication Sig Dispense Refill    baclofen 10 mg tablet TAKE 1 TABLET (10 MG TOTAL) BY MOUTH 2 (TWO) TIMES A DAY AS NEEDED FOR MUSCLE SPASMS (AND ANXIETY) 180 tablet 1    clonazePAM (KlonoPIN) 2 mg tablet Take 1 tablet (2 mg total) by mouth 2 (two) times a day 60 tablet 0    DULoxetine (CYMBALTA) 20 mg  capsule Take 2 capsules (40 mg total) by mouth 2 (two) times a day 360 capsule 2    ergocalciferol (VITAMIN D2) 50,000 units Take 1 capsule (50,000 Units total) by mouth once a week 12 capsule 0    QUEtiapine (SEROquel) 100 mg tablet Take 2 tablets (200 mg total) by mouth daily at bedtime 180 tablet 1    QUEtiapine (SEROquel) 50 mg tablet Take 1 tablet (50 mg total) by mouth daily after breakfast 90 tablet 0    traZODone (DESYREL) 300 MG tablet Take 1 tablet (300 mg total) by mouth daily at bedtime 90 tablet 0    vitamin A 2250 MCG (7500 UT) capsule Take 7,500 Units by mouth daily      tobramycin-dexamethasone (TOBRADEX) ophthalmic suspension Administer 1 drop to both eyes every 4 (four) hours while awake (Patient not taking: Reported on 11/22/2022) 10 mL 0     No current facility-administered medications for this visit.       SOCIAL HISTORY:  Social History     Socioeconomic History    Marital status: /Civil Union     Spouse name: None    Number of children: None    Years of education: None    Highest education level: None   Occupational History    Occupation: retired     Tobacco Use    Smoking status: Former    Smokeless tobacco: Never    Tobacco comments:     quit about 1-2 years ago.     Substance and Sexual Activity    Alcohol use: Yes     Comment: rarely; social    Drug use: Yes     Types: Marijuana     Comment: soically    Sexual activity: Not Currently   Other Topics Concern    None   Social History Narrative    Daily coffee consumption     Social Determinants of Health     Financial Resource Strain: Not on file   Food Insecurity: Not on file   Transportation Needs: Not on file   Physical Activity: Not on file   Stress: Not on file   Social Connections: Not on file   Intimate Partner Violence: Not on file   Housing Stability: Not on file        Review of Systems   Constitutional: Negative.    HENT: Negative.     Eyes: Negative.    Respiratory: Negative.     Cardiovascular: Negative.     Endocrine: Negative.    Musculoskeletal: Negative.    Neurological: Negative.    Hematological: Negative.    Psychiatric/Behavioral: Negative.           Objective:     Physical Exam  Vitals reviewed.   Constitutional:       Appearance: Normal appearance.   HENT:      Head: Normocephalic and atraumatic.      Nose: Nose normal.   Eyes:      Conjunctiva/sclera: Conjunctivae normal.      Pupils: Pupils are equal, round, and reactive to light.   Cardiovascular:      Pulses:           Dorsalis pedis pulses are 2+ on the right side.        Posterior tibial pulses are 2+ on the right side.   Pulmonary:      Effort: Pulmonary effort is normal.   Feet:      Comments: The patient's clinical examination today significant for mild to moderate diffuse edema of the dorsal right midfoot and forefoot.  There is some areas of resolving ecchymosis to the midfoot and hindfoot as well.  There are no open lesions.  Neurovascular status intact.  Pedal pulses are palpable.  Skin:     General: Skin is warm.      Capillary Refill: Capillary refill takes less than 2 seconds.   Neurological:      General: No focal deficit present.      Mental Status: He is alert and oriented to person, place, and time.   Psychiatric:         Mood and Affect: Mood normal.         Behavior: Behavior normal.         Thought Content: Thought content normal.

## 2024-04-29 DIAGNOSIS — F41.9 ANXIETY: ICD-10-CM

## 2024-04-29 RX ORDER — CLONAZEPAM 2 MG/1
2 TABLET ORAL 2 TIMES DAILY
Qty: 60 TABLET | Refills: 0 | Status: SHIPPED | OUTPATIENT
Start: 2024-04-29

## 2024-04-29 NOTE — TELEPHONE ENCOUNTER
Medication Refill Request     Name of Medication Klonopin 2 mg  Dose/Frequency 1bid  Quantity 60  Verified pharmacy   [x]  Verified ordering Provider   [x]  Does patient have enough for the next 3 days? Yes [] No [x]  Does patient have a follow-up appointment scheduled? Yes [x] No []   If so when is appointment: 5/22/2024

## 2024-05-06 ENCOUNTER — OFFICE VISIT (OUTPATIENT)
Dept: PODIATRY | Facility: CLINIC | Age: 64
End: 2024-05-06
Payer: COMMERCIAL

## 2024-05-06 ENCOUNTER — HOSPITAL ENCOUNTER (OUTPATIENT)
Dept: RADIOLOGY | Facility: HOSPITAL | Age: 64
Discharge: HOME | End: 2024-05-06
Attending: PODIATRIST
Payer: COMMERCIAL

## 2024-05-06 VITALS
BODY MASS INDEX: 32.92 KG/M2 | HEIGHT: 70 IN | OXYGEN SATURATION: 97 % | HEART RATE: 80 BPM | DIASTOLIC BLOOD PRESSURE: 88 MMHG | SYSTOLIC BLOOD PRESSURE: 140 MMHG

## 2024-05-06 DIAGNOSIS — S92.244A NONDISPLACED FRACTURE OF MEDIAL CUNEIFORM OF RIGHT FOOT, INITIAL ENCOUNTER FOR CLOSED FRACTURE: ICD-10-CM

## 2024-05-06 DIAGNOSIS — S93.324D LISFRANC DISLOCATION, RIGHT, SUBSEQUENT ENCOUNTER: Primary | ICD-10-CM

## 2024-05-06 PROCEDURE — 99213 OFFICE O/P EST LOW 20 MIN: CPT | Performed by: PODIATRIST

## 2024-05-06 PROCEDURE — 73630 X-RAY EXAM OF FOOT: CPT

## 2024-05-06 NOTE — PROGRESS NOTES
Assessment/Plan:     The patient's clinical examination today is significant for mild tenderness with palpation to the area of Lisfranc's joint on the right midfoot.  There is no erythema no calor nor ecchymosis.  Very mild diffuse edema across the midfoot.    Repeat x-rays of the patient's right foot taken today were weightbearing.  They were personally viewed interpreted.  It does show significant gapping at the Lisfranc's joint.  These findings were discussed with the patient.    I did recommend surgical intervention at this point in time given today's x-ray findings.  I believe the patient's foot can be corrected percutaneously with screw fixation.  The patient is hesitant and would like to continue with conservative therapy for now.  I instructed him to maintain guarded weightbearing in a cam boot.  He was instructed to stay off his foot is much as possible.    Recommend follow-up in 3 to 4 weeks for repeat x-rays.  If he does not show any improvement in terms of his pain or alignment, we we will further discuss our surgical options.     Diagnoses and all orders for this visit:    Lisfranc dislocation, right, subsequent encounter    Nondisplaced fracture of medial cuneiform of right foot, initial encounter for closed fracture  -     X-ray foot right 3+ views; Future          Subjective:     Patient ID: Juan José Corrales is a 63 y.o. male.    The patient resents today for follow-up of right midfoot fracture involving Lisfranc's joint.  He notes minimal discomfort to his right foot.  He does note tightness and discomfort when trying to ambulate around his house without the boot on such as when he is going to the bathroom in the middle of the night.  Is otherwise been compliant with his boot and is utilizing a single crutch for support.          PAST MEDICAL HISTORY:  Past Medical History:   Diagnosis Date    Anxiety     BPH (benign prostatic hyperplasia)     Depressed     Hayfever     Inguinal hernia     last  assessed: 1/21/16    Mild depressive disorder     last assessed: 2/24/16    Suicide attempt (HCC)        PAST SURGICAL HISTORY:  Past Surgical History:   Procedure Laterality Date    HERNIA REPAIR Bilateral     5 months ago.     KNEE ARTHROSCOPY Left     Possible ACL repair    KNEE SURGERY      ROTATOR CUFF REPAIR Left     about 1 year ago        ALLERGIES:  Amoxicillin, Motrin [ibuprofen], and Other    MEDICATIONS:  Current Outpatient Medications   Medication Sig Dispense Refill    baclofen 10 mg tablet TAKE 1 TABLET (10 MG TOTAL) BY MOUTH 2 (TWO) TIMES A DAY AS NEEDED FOR MUSCLE SPASMS (AND ANXIETY) 180 tablet 1    clonazePAM (KlonoPIN) 2 mg tablet Take 1 tablet (2 mg total) by mouth 2 (two) times a day 60 tablet 0    DULoxetine (CYMBALTA) 20 mg capsule Take 2 capsules (40 mg total) by mouth 2 (two) times a day 360 capsule 2    ergocalciferol (VITAMIN D2) 50,000 units Take 1 capsule (50,000 Units total) by mouth once a week 12 capsule 0    QUEtiapine (SEROquel) 100 mg tablet Take 2 tablets (200 mg total) by mouth daily at bedtime 180 tablet 1    QUEtiapine (SEROquel) 50 mg tablet Take 1 tablet (50 mg total) by mouth daily after breakfast 90 tablet 0    traZODone (DESYREL) 300 MG tablet Take 1 tablet (300 mg total) by mouth daily at bedtime 90 tablet 0    vitamin A 2250 MCG (7500 UT) capsule Take 7,500 Units by mouth daily      tobramycin-dexamethasone (TOBRADEX) ophthalmic suspension Administer 1 drop to both eyes every 4 (four) hours while awake (Patient not taking: Reported on 11/22/2022) 10 mL 0     No current facility-administered medications for this visit.       SOCIAL HISTORY:  Social History     Socioeconomic History    Marital status: /Civil Union     Spouse name: None    Number of children: None    Years of education: None    Highest education level: None   Occupational History    Occupation: retired     Tobacco Use    Smoking status: Former    Smokeless tobacco: Never    Tobacco  comments:     quit about 1-2 years ago.     Substance and Sexual Activity    Alcohol use: Yes     Comment: rarely; social    Drug use: Yes     Types: Marijuana     Comment: soically    Sexual activity: Not Currently   Other Topics Concern    None   Social History Narrative    Daily coffee consumption     Social Determinants of Health     Financial Resource Strain: Not on file   Food Insecurity: Not on file   Transportation Needs: Not on file   Physical Activity: Not on file   Stress: Not on file   Social Connections: Not on file   Intimate Partner Violence: Not on file   Housing Stability: Not on file        Review of Systems   Constitutional: Negative.    HENT: Negative.     Eyes: Negative.    Respiratory: Negative.     Cardiovascular: Negative.    Endocrine: Negative.    Musculoskeletal: Negative.    Neurological: Negative.    Hematological: Negative.    Psychiatric/Behavioral: Negative.           Objective:     Physical Exam  Vitals reviewed.   Constitutional:       Appearance: Normal appearance.   HENT:      Head: Normocephalic and atraumatic.      Nose: Nose normal.   Eyes:      Conjunctiva/sclera: Conjunctivae normal.      Pupils: Pupils are equal, round, and reactive to light.   Cardiovascular:      Pulses:           Dorsalis pedis pulses are 2+ on the right side.        Posterior tibial pulses are 2+ on the right side.   Pulmonary:      Effort: Pulmonary effort is normal.   Musculoskeletal:        Feet:    Feet:      Right foot:      Skin integrity: Skin integrity normal.      Comments: The patient's clinical examination today is significant for mild tenderness with palpation to the area of Lisfranc's joint on the right midfoot.  There is no erythema no calor nor ecchymosis.  Very mild diffuse edema across the midfoot.    Skin:     General: Skin is warm.      Capillary Refill: Capillary refill takes less than 2 seconds.   Neurological:      General: No focal deficit present.      Mental Status: He is alert  and oriented to person, place, and time.   Psychiatric:         Mood and Affect: Mood normal.         Behavior: Behavior normal.         Thought Content: Thought content normal.

## 2024-05-15 DIAGNOSIS — F33.1 MODERATE EPISODE OF RECURRENT MAJOR DEPRESSIVE DISORDER (HCC): ICD-10-CM

## 2024-05-15 DIAGNOSIS — F41.1 GAD (GENERALIZED ANXIETY DISORDER): ICD-10-CM

## 2024-05-15 RX ORDER — DULOXETIN HYDROCHLORIDE 20 MG/1
40 CAPSULE, DELAYED RELEASE ORAL 2 TIMES DAILY
Qty: 360 CAPSULE | Refills: 2 | Status: SHIPPED | OUTPATIENT
Start: 2024-05-15

## 2024-05-28 DIAGNOSIS — F41.9 ANXIETY: ICD-10-CM

## 2024-05-28 RX ORDER — CLONAZEPAM 2 MG/1
2 TABLET ORAL 2 TIMES DAILY
Qty: 60 TABLET | Refills: 0 | Status: SHIPPED | OUTPATIENT
Start: 2024-05-28

## 2024-05-28 NOTE — TELEPHONE ENCOUNTER
Medication Refill Request     Name of Medication  clonazePAM (KlonoPIN) 2 mg tablet   Dose/Frequency      Take 1 tablet (2 mg total) by mouth 2 (two) times a day     Quantity 60  Verified pharmacy   [x]  Verified ordering Provider   [x]  Does patient have enough for the next 3 days? Yes [] No [x]  Does patient have a follow-up appointment scheduled? Yes [x] No []   If so when is appointment: 07/30/24 @ 2 pm

## 2024-06-20 ENCOUNTER — APPOINTMENT (OUTPATIENT)
Dept: RADIOLOGY | Facility: CLINIC | Age: 64
End: 2024-06-20
Payer: COMMERCIAL

## 2024-06-20 ENCOUNTER — OFFICE VISIT (OUTPATIENT)
Age: 64
End: 2024-06-20
Payer: COMMERCIAL

## 2024-06-20 VITALS
HEART RATE: 78 BPM | SYSTOLIC BLOOD PRESSURE: 159 MMHG | HEIGHT: 70 IN | BODY MASS INDEX: 32.78 KG/M2 | DIASTOLIC BLOOD PRESSURE: 96 MMHG | WEIGHT: 229 LBS

## 2024-06-20 DIAGNOSIS — E55.9 VITAMIN D DEFICIENCY: ICD-10-CM

## 2024-06-20 DIAGNOSIS — M79.673 PAIN OF FOOT, UNSPECIFIED LATERALITY: Primary | ICD-10-CM

## 2024-06-20 DIAGNOSIS — S93.324S DISLOCATION OF TARSOMETATARSAL JOINT OF RIGHT FOOT, SEQUELA: ICD-10-CM

## 2024-06-20 DIAGNOSIS — M79.673 PAIN OF FOOT, UNSPECIFIED LATERALITY: ICD-10-CM

## 2024-06-20 DIAGNOSIS — Z01.818 PREOP EXAMINATION: ICD-10-CM

## 2024-06-20 PROCEDURE — 99214 OFFICE O/P EST MOD 30 MIN: CPT | Performed by: STUDENT IN AN ORGANIZED HEALTH CARE EDUCATION/TRAINING PROGRAM

## 2024-06-20 PROCEDURE — 73630 X-RAY EXAM OF FOOT: CPT

## 2024-06-20 RX ORDER — CHLORHEXIDINE GLUCONATE ORAL RINSE 1.2 MG/ML
15 SOLUTION DENTAL ONCE
OUTPATIENT
Start: 2024-06-20 | End: 2024-06-20

## 2024-06-20 RX ORDER — CHLORHEXIDINE GLUCONATE 40 MG/ML
SOLUTION TOPICAL DAILY PRN
OUTPATIENT
Start: 2024-06-20

## 2024-06-20 NOTE — PROGRESS NOTES
[facility] Podiatric Medicine and Surgery Office Visit    ASSESSMENT     Diagnoses and all orders for this visit:    Pain of foot, unspecified laterality  -     XR foot 3+ vw right; Future    Dislocation of tarsometatarsal joint of right foot, sequela  -     Case request operating room: Lisfranc Arthrodesis; Standing  -     Ambulatory referral to [facility]; Future  -     CBC and Platelet; Future  -     Comprehensive metabolic panel; Future  -     EKG 12 lead; Future  -     XR chest pa & lateral; Future  -     Case request operating room: Lisfranc Arthrodesis  -     CT lower extremity wo contrast right; Future    Preop examination  -     Ambulatory referral to [facility]; Future  -     CBC and Platelet; Future  -     Comprehensive metabolic panel; Future  -     EKG 12 lead; Future  -     XR chest pa & lateral; Future    Vitamin D deficiency  -     Vitamin D 25 hydroxy; Future    Other orders  -     Nursing Communication Warmimg Interventions Implemented; Standing  -     Nursing Communication CHG bath, have staff wash entire body (neck down) per pre-op bathing protocol. Routine, evening prior to, and day of surgery.; Standing  -     Nursing Communication Swab both nares with Povidone-Iodine solution, EXCLUDE if patient has shellfish/Iodine allergy, and replace with nasal alcohol swabstick. Routine, day of surgery, on call to OR; Standing  -     chlorhexidine (PERIDEX) 0.12 % oral rinse 15 mL  -     Void on call to OR; Standing  -     Insert peripheral IV; Standing  -     Diet NPO; Sips with meds; Standing  -     chlorhexidine gluconate (HIBICLENS) 4 % topical liquid  -     ceFAZolin (ANCEF) 2,000 mg in sodium chloride 0.9 % 50 mL IVPB         Problem List Items Addressed This Visit    None  Visit Diagnoses       Pain of foot, unspecified laterality    -  Primary    Relevant Orders    XR foot 3+ vw right    Dislocation of tarsometatarsal joint of right foot, sequela        Relevant Orders    Case request  operating room: Lisfranc Arthrodesis (Completed)    Ambulatory referral to Family Practice    CBC and Platelet    Comprehensive metabolic panel    EKG 12 lead    XR chest pa & lateral    CT lower extremity wo contrast right    Preop examination        Relevant Orders    Ambulatory referral to Family Practice    CBC and Platelet    Comprehensive metabolic panel    EKG 12 lead    XR chest pa & lateral    Vitamin D deficiency        Relevant Orders    Vitamin D 25 hydroxy          PLAN  -Patient was educated regarding their condition.  -We discussed benefits and risks of surgical vs conservative treatment. At this point, I recommend surgical intervention to repair the patient's lisfranc fracture dislocation  -We discussed vitamin D supplementation, follow-up vitamin D levels  -CT scan to be performed prior to surgery to evaluate Lisfranc complex and to assist in surgical planning  -MRI of the right foot from 4/4/2024 reviewed: At this time there is relatively preserved alignment of the second metatarsal with intermediate cuneiform, flattening of the plantar arch noted.  -X-ray of the right foot from 5/6/2024 reviewed: Medial cuneiform fracture with widening of the Lisfranc interval  -I was very clear at the beginning of the discussion about alternatives to this surgery including benign neglect, bracing, and alternative surgical opinions. I spent time to discuss with the patient the surgical procedure(s) as Arthrodesis of the Lisfranc joint(s). pre-op testing, and post-op course (nonweightbearing for 6 to 8 weeks) required to properly heal the surgery. I discussed risks as infection, scar, swelling, chronic pain, painful or prominent hardware, possible need to remove hardware, poor healing of incision or bone that could require more surgery, incomplete correction of deformities or recurrence of deformities, change in shape of foot, toe, or walking and function, numbness, neuritis/RSD, blood clots in the leg or lung, and  even death from anesthesia complications. No guarantees were given and the possibility of recurrent deformity or incomplete correction were discussed before patient signed the consent form. We also discussed the need for possible anticoagulation. The offloading device necessary after the surgery will be a posterior splint initially, followed by cam boot. The surgery, history and physical and PATS will be scheduled.      -Follow-up CBC, CMP, chest x-ray, EKG, CT scan, PCP visit for preoperative risk evaluation.  -Patient will RTC as scheduled post-operatively    -X-ray of right foot from 6/20/2024 interpreted independently: Significant widening of the Lisfranc interval noted with sangeetha sign adjacent to the second metatarsal base noted on AP view.  On oblique view the third metatarsal and fourth metatarsal do appear to have preserved alignment at the respective tarsometatarsal joints.  No sagittal plane shift/dislocation noted on lateral view.    SUBJECTIVE    Chief Complaint:  Right foot pain     Patient ID: Juan José Corrales     6/20/2024: Juan José is a pleasant 63-year-old male who presents today with right foot pain.  He states that this first occurred 7 to 8 weeks ago when he slipped in his bathroom and twisted his foot.  He states he felt immediate pain at that time.  He states that his foot was very swollen and he cannot even get his shoe on.  He went to the emergency department first where he was given a cam boot, and then followed up with another podiatrist who recommended surgery for his Lisfranc injury, however the patient declined at this time and states that he had to think about it more.  He presents to our office walking with his cam boot and 1 crutch to take the pressure off of his right foot.  He states that he does take his cam boot off at night and will sometimes walk to the bathroom without it on.  He has not attempted any other treatment.        The following portions of the patient's history were  "reviewed and updated as appropriate: allergies, current medications, past family history, past medical history, past social history, past surgical history and problem list.    Review of Systems   Constitutional: Negative.    Respiratory: Negative.     Cardiovascular: Negative.    Gastrointestinal: Negative.    Genitourinary: Negative.    Musculoskeletal:  Positive for arthralgias and gait problem.   Skin: Negative.          OBJECTIVE      /96   Pulse 78   Ht 5' 10\" (1.778 m)   Wt 104 kg (229 lb)   BMI 32.86 kg/m²        Physical Exam  Constitutional:       Appearance: Normal appearance.   HENT:      Head: Normocephalic and atraumatic.   Eyes:      General:         Right eye: No discharge.         Left eye: No discharge.   Cardiovascular:      Rate and Rhythm: Normal rate and regular rhythm.      Pulses:           Dorsalis pedis pulses are 2+ on the right side and 2+ on the left side.        Posterior tibial pulses are 2+ on the right side and 2+ on the left side.   Pulmonary:      Effort: Pulmonary effort is normal.      Breath sounds: Normal breath sounds.   Skin:     General: Skin is warm.      Capillary Refill: Capillary refill takes less than 2 seconds.   Neurological:      Mental Status: He is alert and oriented to person, place, and time.      Sensory: Sensation is intact. No sensory deficit.   Psychiatric:         Mood and Affect: Mood normal.         Vascular:  -DP and PT pulses intact b/l  -Capillary refill time <2 sec b/l  -Digital hair growth: Present  -Skin temp: WNL    MSK:  -Pain on palpation to the second TMT J  -No gross deformities noted   -MMT deferred secondary to pain  -Ankle dorsiflexion >10 degrees with knee extended and knee flexed of the uninjured leg  -Abduction of forefoot causes pain of the RLE    Neuro:  -Light sensation intact bilaterally  -Protective sensation intact bilaterally    Derm:  -No lesions, abrasions, or open wounds noted  -Edema noted to the right forefoot " globally

## 2024-06-24 ENCOUNTER — TELEPHONE (OUTPATIENT)
Age: 64
End: 2024-06-24

## 2024-06-24 NOTE — TELEPHONE ENCOUNTER
Caller: Juan José Corrales    Doctor: Deangelo     Reason for call: Juan José will have to postpone his surgery.  His wife fell over the weekend and broke her knee.  Can someone from the office reach out to him?  Thank you.      Call back#: [phone]

## 2024-06-24 NOTE — TELEPHONE ENCOUNTER
Caller: Patient     Doctor/Office: Podiatry     Call regarding :  rs surgery     Call was transferred to: Podiatry

## 2024-06-26 ENCOUNTER — TELEPHONE (OUTPATIENT)
Age: 64
End: 2024-06-26

## 2024-06-26 NOTE — TELEPHONE ENCOUNTER
Opened chart to see if surgery was scheduled for preop clearance. Saw podiatry note stating patient canceled due to wife falling and breaking her knee. NFA needed at this time.

## 2024-06-28 DIAGNOSIS — F41.9 ANXIETY: ICD-10-CM

## 2024-06-28 RX ORDER — CLONAZEPAM 2 MG/1
2 TABLET ORAL 2 TIMES DAILY
Qty: 60 TABLET | Refills: 0 | Status: SHIPPED | OUTPATIENT
Start: 2024-06-28

## 2024-06-28 NOTE — TELEPHONE ENCOUNTER
Medication Refill Request     Name of Medication Klonopin 2 mg  Dose/Frequency 1bid  Quantity 60  Verified pharmacy   [x]  Verified ordering Provider   [x]  Does patient have enough for the next 3 days? Yes [] No [x]  Does patient have a follow-up appointment scheduled? Yes [x] No []   If so when is appointment: 7/30/2024  Dr Emily London---provider on vacation

## 2024-07-08 DIAGNOSIS — F41.1 GAD (GENERALIZED ANXIETY DISORDER): ICD-10-CM

## 2024-07-08 RX ORDER — QUETIAPINE FUMARATE 100 MG/1
200 TABLET, FILM COATED ORAL
Qty: 180 TABLET | Refills: 1 | Status: SHIPPED | OUTPATIENT
Start: 2024-07-08

## 2024-07-19 DIAGNOSIS — F41.9 ANXIETY: ICD-10-CM

## 2024-07-19 NOTE — TELEPHONE ENCOUNTER
Reason for call:   [x] Refill   [] Prior Auth  [] Other:     Office:   [] PCP/Provider -   [x] Specialty/Provider - [facility]     Medication:   QUEtiapine (SEROquel) 50 mg tablet       Dose/Frequency: Take 1 tablet (50 mg total) by mouth daily after breakfast     Quantity: 90    Pharmacy: [facility]/pharmacy #[id number] - [place] - [address] [place] [address] RD     Does the patient have enough for 3 days?   [] Yes   [x] No - Send as HP to POD

## 2024-07-20 RX ORDER — QUETIAPINE FUMARATE 50 MG/1
50 TABLET, FILM COATED ORAL
Qty: 90 TABLET | Refills: 0 | Status: SHIPPED | OUTPATIENT
Start: 2024-07-20

## 2024-07-29 DIAGNOSIS — F41.1 GAD (GENERALIZED ANXIETY DISORDER): ICD-10-CM

## 2024-07-29 DIAGNOSIS — G47.00 INSOMNIA, UNSPECIFIED TYPE: ICD-10-CM

## 2024-07-29 DIAGNOSIS — F41.9 ANXIETY: ICD-10-CM

## 2024-07-29 NOTE — TELEPHONE ENCOUNTER
Reason for call:   [x] Refill   [] Prior Auth  [] Other:     Office:   [] PCP/Provider -   [x] Specialty/Provider -     Medication: baclofen 10 mg tablet     Dose/Frequency: TAKE 1 TABLET (10 MG TOTAL) BY MOUTH 2 (TWO) TIMES A DAY AS NEEDED FOR MUSCLE SPASMS (AND ANXIETY),     Quantity: 180 tablet     Medication: clonazePAM (KlonoPIN) 2 mg tablet     Dose/Frequency: Take 1 tablet (2 mg total) by mouth 2 (two) times a day     Quantity: 60 tablet     Medication: traZODone (DESYREL) 300 MG tablet     Dose/Frequency: Take 1 tablet (300 mg total) by mouth daily at bedtime     Quantity: 90 tablet       Pharmacy: [facility]/pharmacy #[id number] - [place] - [address] [place] [address] [phone]    Does the patient have enough for 3 days?   [] Yes   [x] No - Send as HP to POD

## 2024-07-31 ENCOUNTER — TELEPHONE (OUTPATIENT)
Dept: PSYCHIATRY | Facility: CLINIC | Age: 64
End: 2024-07-31

## 2024-07-31 RX ORDER — TRAZODONE HYDROCHLORIDE 300 MG/1
300 TABLET ORAL
Qty: 90 TABLET | Refills: 0 | Status: SHIPPED | OUTPATIENT
Start: 2024-07-31

## 2024-07-31 RX ORDER — CLONAZEPAM 2 MG/1
2 TABLET ORAL 2 TIMES DAILY
Qty: 60 TABLET | Refills: 0 | Status: SHIPPED | OUTPATIENT
Start: 2024-07-31

## 2024-07-31 RX ORDER — BACLOFEN 10 MG/1
10 TABLET ORAL 2 TIMES DAILY PRN
Qty: 180 TABLET | Refills: 0 | Status: SHIPPED | OUTPATIENT
Start: 2024-07-31

## 2024-07-31 NOTE — TELEPHONE ENCOUNTER
Patient called the RX Refill Line. Message is being forwarded to the office.     Patient is checking on status of medication refills as he is out of all meds.  He has rescheduled his appointment for 9/3/24.    Please contact patient at [phone]

## 2024-07-31 NOTE — TELEPHONE ENCOUNTER
Lvm for pt to make an appointment with Dr London, for an in office visit and per Dr London he need the appointment to get any refill.

## 2024-08-01 ENCOUNTER — OFFICE VISIT (OUTPATIENT)
Dept: PSYCHIATRY | Facility: CLINIC | Age: 64
End: 2024-08-01
Payer: COMMERCIAL

## 2024-08-01 VITALS
SYSTOLIC BLOOD PRESSURE: 170 MMHG | HEIGHT: 70 IN | WEIGHT: 225 LBS | HEART RATE: 88 BPM | BODY MASS INDEX: 32.21 KG/M2 | DIASTOLIC BLOOD PRESSURE: 105 MMHG

## 2024-08-01 DIAGNOSIS — E55.9 VITAMIN D DEFICIENCY: ICD-10-CM

## 2024-08-01 DIAGNOSIS — F41.1 GAD (GENERALIZED ANXIETY DISORDER): Primary | ICD-10-CM

## 2024-08-01 DIAGNOSIS — Z79.899 HIGH RISK MEDICATIONS (NOT ANTICOAGULANTS) LONG-TERM USE: ICD-10-CM

## 2024-08-01 DIAGNOSIS — F33.9 EPISODE OF RECURRENT MAJOR DEPRESSIVE DISORDER, UNSPECIFIED DEPRESSION EPISODE SEVERITY (HCC): ICD-10-CM

## 2024-08-01 PROCEDURE — 99215 OFFICE O/P EST HI 40 MIN: CPT | Performed by: NURSE PRACTITIONER

## 2024-08-01 PROCEDURE — 90836 PSYTX W PT W E/M 45 MIN: CPT | Performed by: NURSE PRACTITIONER

## 2024-08-01 NOTE — BH TREATMENT PLAN
TREATMENT PLAN (Medication Management Only)        [facility]    Name and Date of Birth:  Juan José Corrales 64 y.o. 1960  Date of Treatment Plan: August 1, 2024  Diagnosis/Diagnoses:    1. OSITO (generalized anxiety disorder)    2. Episode of recurrent major depressive disorder, unspecified depression episode severity (HCC)      Strengths/Personal Resources for Self-Care: taking medications as prescribed, ability to communicate needs.  Area/Areas of need (in own words): anxiety, depression  1. Long Term Goal: increase mood stability.  Target Date:6 months - 2/1/2025  Person/Persons responsible for completion of goal: feli Can  2.  Short Term Objective (s) - How will we reach this goal?:   A. Provider new recommended medication/dosage changes and/or continue medication(s): continue current medications as prescribed.  B.  Coping skills .  C.  Adequate grass nutrition demonstration .  Target Date:6 months - 2/1/2025  Person/Persons Responsible for Completion of Goal: rebecca Can  Progress Towards Goals: continuing treatment  Treatment Modality: medication management every 3 months  Review due 180 days from date of this plan: 6 months - 2/1/2025  Expected length of service: maintenance  My Physician/PA/NP and I have developed this plan together and I agree to work on the goals and objectives. I understand the treatment goals that were developed for my treatment.
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
## 2024-08-02 ENCOUNTER — TELEPHONE (OUTPATIENT)
Age: 64
End: 2024-08-02

## 2024-08-02 NOTE — TELEPHONE ENCOUNTER
LM on Adiel's VM informing him that there should be refills available for all his medications at the pharmacy.  Requested that he call the pharmacy to follow up.  Instructed him to call the office if he is still having problems getting his medication.

## 2024-08-02 NOTE — TELEPHONE ENCOUNTER
Patient called office stating the pharmacy only gave him Seroqeul 50mg and not the other medications that were sent on 7/31/24. Patient requested writer to call pharmacy and inform them they gave him the wrong medication. Writer left a vm for pharmacy to call patient back. Writer then called patient back and went to . Informed patient to take the rx back to pharmacy and get new rx.

## 2024-08-26 DIAGNOSIS — F41.9 ANXIETY: ICD-10-CM

## 2024-08-26 RX ORDER — CLONAZEPAM 2 MG/1
2 TABLET ORAL 2 TIMES DAILY
Qty: 60 TABLET | Refills: 0 | Status: SHIPPED | OUTPATIENT
Start: 2024-08-26

## 2024-09-02 ENCOUNTER — TELEPHONE (OUTPATIENT)
Dept: OTHER | Facility: OTHER | Age: 64
End: 2024-09-02

## 2024-09-02 NOTE — TELEPHONE ENCOUNTER
Patient is calling regarding cancelling an appointment.    Date/Time: 9/3 at 1pm    Patient was rescheduled: YES [] NO [x]    Patient requesting call back to reschedule: YES [x] NO []         Patient requesting to reschedule his appt 2-3 wks out due to car issues.

## 2024-09-23 DIAGNOSIS — F41.9 ANXIETY: ICD-10-CM

## 2024-09-23 RX ORDER — CLONAZEPAM 2 MG/1
2 TABLET ORAL 2 TIMES DAILY
Qty: 60 TABLET | Refills: 0 | Status: SHIPPED | OUTPATIENT
Start: 2024-09-23

## 2024-09-23 NOTE — TELEPHONE ENCOUNTER
Reason for call:   [x] Refill   [] Prior Auth  [] Other:     Office:   [] PCP/Provider -   [x] Specialty/Provider - Psychiatry    Medication:     clonazePAM (KlonoPIN) 2 mg tablet       Dose/Frequency:     2 mg, Oral, 2 times daily       Quantity: 60    Pharmacy: [facility]/pharmacy #[id number] - [place] - [address]     Does the patient have enough for 3 days?   [] Yes   [x] No - Send as HP to POD

## 2024-09-25 ENCOUNTER — TELEPHONE (OUTPATIENT)
Dept: PSYCHIATRY | Facility: CLINIC | Age: 64
End: 2024-09-25

## 2024-09-25 NOTE — TELEPHONE ENCOUNTER
LM on Juan José's VM informing him that his cholesterol is elevated.  Requested that he monitor his diet and add some exercise into his daily routine.  Also informed him his Vit D level is low at  23 and provider wants him to take an OTC Vit D supplement in the amount of 2,000 IUs daily.  Instructed him to call the office if he has any questions.    No

## 2024-09-25 NOTE — TELEPHONE ENCOUNTER
----- Message from Emily London, PhD sent at 9/24/2024  5:30 PM EDT -----  Please call him to let him know cholesterol is elevated triglycerides, LDL-cholesterol  Non HDL cholesterol, diet recommended him exercise vitamin D is 23 should be .  He should take 2000 international units of vitamin D over-the-counter daily.

## 2024-09-30 ENCOUNTER — TELEPHONE (OUTPATIENT)
Dept: PSYCHIATRY | Facility: CLINIC | Age: 64
End: 2024-09-30

## 2024-09-30 NOTE — TELEPHONE ENCOUNTER
Left voicemail for patient to inform them that their appointment for 09/30/24 at 12:40 PM was cancelled due to the provider being out of office.     Patient was rescheduled: YES [] NO [x]  If yes, when was patient rescheduled for:   If no, when is the patient's next appointment:   Lvm To R/S appt with Dr London  Patient requesting call back to reschedule: YES [] NO []

## 2024-10-10 ENCOUNTER — OFFICE VISIT (OUTPATIENT)
Dept: PSYCHIATRY | Facility: CLINIC | Age: 64
End: 2024-10-10
Payer: COMMERCIAL

## 2024-10-10 VITALS
HEIGHT: 70 IN | DIASTOLIC BLOOD PRESSURE: 88 MMHG | SYSTOLIC BLOOD PRESSURE: 160 MMHG | HEART RATE: 88 BPM | BODY MASS INDEX: 32.01 KG/M2 | WEIGHT: 223.6 LBS

## 2024-10-10 DIAGNOSIS — F41.1 GAD (GENERALIZED ANXIETY DISORDER): ICD-10-CM

## 2024-10-10 DIAGNOSIS — F33.9 EPISODE OF RECURRENT MAJOR DEPRESSIVE DISORDER, UNSPECIFIED DEPRESSION EPISODE SEVERITY (HCC): Primary | ICD-10-CM

## 2024-10-10 PROCEDURE — 90833 PSYTX W PT W E/M 30 MIN: CPT | Performed by: NURSE PRACTITIONER

## 2024-10-10 PROCEDURE — 99214 OFFICE O/P EST MOD 30 MIN: CPT | Performed by: NURSE PRACTITIONER

## 2024-10-21 DIAGNOSIS — F41.9 ANXIETY: ICD-10-CM

## 2024-10-21 NOTE — ASSESSMENT & PLAN NOTE
Anxiety continues Klonopin 2 mg 2 times a day is required to reduce anxiety.  Takes Seroquel at night to help him sleep and 50 mg in the morning to help with the anxiety.  Trazodone is also taking 300 mg at bedtime.  Reports he does sleep however continues with situational anxiety revolving around the divorce.  He is not quite sure where he is going to live and what funds he will have.  Support was provided.  Aims is negative.

## 2024-10-21 NOTE — PSYCH
Visit Time    Visit Start Time: 2:00  Visit Stop Time: 2:30  Total Visit Duration:  30 minutes    Subjective:     Patient ID: Juan José Corrales is a 64 y.o. male.  History of anxiety and depression seen for medication management and mood assessment  Assessment & Plan  Episode of recurrent major depressive disorder, unspecified depression episode severity (HCC)  Juan José reports he continues to have depression due to going through a divorce and is concerned about what he will be left with.  He denies suicidal ideation takes Cymbalta 40 mg twice a day which seems to help.  Reports he is eating and sleeping.  Has limited supports from family.  Takes medication as prescribed.  Baclofen helps increase his mood and he is less depressed.         OSITO (generalized anxiety disorder)  Anxiety continues Klonopin 2 mg 2 times a day is required to reduce anxiety.  Takes Seroquel at night to help him sleep and 50 mg in the morning to help with the anxiety.  Trazodone is also taking 300 mg at bedtime.  Reports he does sleep however continues with situational anxiety revolving around the divorce.  He is not quite sure where he is going to live and what funds he will have.  Support was provided.  Aims is negative.         HPI ROS Appetite Changes and Sleep: normal appetite and decreased energy    Review Of Systems:     Mood Anxiety and Depression   Behavior Normal    Thought Content Normal   General Relationship Problems, Emotional Problems, and Sleep Disturbances   Personality Normal   Other Psych Symptoms Normal   Constitutional As Noted in HPI   ENT As Noted in HPI   Cardiovascular As Noted in HPI   Respiratory As Noted in HPI   Gastrointestinal As Noted in HPI   Genitourinary As Noted in HPI   Musculoskeletal As Noted in HPI   Integumentary As Noted in HPI   Neurological As Noted in HPI   Endocrine Normal    Other Symptoms Normal        Laboratory Results:     Substance Abuse History:  Social History     Substance and Sexual Activity    Drug Use Yes    Types: Marijuana    Comment: soically       Family Psychiatric History:   Family History   Problem Relation Age of Onset    Suicide Attempts Father     Depression Father     Hyperlipidemia Maternal Aunt     Stroke Maternal Aunt     Cervical cancer Family        The following portions of the patient's history were reviewed and updated as appropriate: allergies, current medications, past family history, past medical history, past social history, past surgical history, and problem list.    Social History     Socioeconomic History    Marital status: /Civil Union     Spouse name: Not on file    Number of children: Not on file    Years of education: Not on file    Highest education level: Not on file   Occupational History    Occupation: retired     Tobacco Use    Smoking status: Former    Smokeless tobacco: Never    Tobacco comments:     quit about 1-2 years ago.     Substance and Sexual Activity    Alcohol use: Yes     Comment: rarely; social    Drug use: Yes     Types: Marijuana     Comment: soically    Sexual activity: Not Currently   Other Topics Concern    Not on file   Social History Narrative    Daily coffee consumption     Social Determinants of Health     Financial Resource Strain: Not on file   Food Insecurity: Not on file   Transportation Needs: Not on file   Physical Activity: Not on file   Stress: Not on file   Social Connections: Not on file   Intimate Partner Violence: Not on file   Housing Stability: Not on file     Social History     Social History Narrative    Daily coffee consumption       Objective:       Mental status:  Appearance calm and cooperative , adequate hygiene and grooming, and good eye contact    Mood anxious   Affect affect appropriate    Speech a normal rate   Thought Processes normal thought processes   Hallucinations no hallucinations present    Thought Content no delusions   Abnormal Thoughts no suicidal thoughts  and no homicidal thoughts   "  Orientation  oriented to person and place and time   Remote Memory short term memory intact and long term memory intact   Attention Span concentration intact   Intellect Appears to be of Average Intelligence   Insight Insight intact   Judgement judgment was intact   Muscle Strength Muscle strength and tone were normal   Language no difficulty naming common objects   Fund of Knowledge displays adequate knowledge of current events   Pain moderate to severe   Pain Scale 5       Assessment/Plan:       Diagnoses and all orders for this visit:    Episode of recurrent major depressive disorder, unspecified depression episode severity (HCC)    OSITO (generalized anxiety disorder)            Treatment Recommendations- Risks Benefits      Immediate Medical/Psychiatric/Psychotherapy Treatments and Any Precautions: Continue treatment plan    Risks, Benefits And Possible Side Effects Of Medications:  {PSYCH RISK, BENEFITS AND POSSIBLE SIDE EFFECTS (Optional):98451    Controlled Medication Discussion: He is aware of safe use and storage of medication    Psychotherapy Provided: 30 minutes individual psychotherapy provided.   Supportive therapy  Medication evaluation  Mood assessment  Surveys reviewed and confirmed  Normalized and validated his feelings  Safety plan not compiled; however, she is aware of who to call in crisis, 988 and ED if necessary  Goals discussed in session: Stable mood  \"Portions of the record may have been created with voice recognition software. Occasional wrong word or \"sound a like\" substitutions may have occurred due to the inherent limitations of voice recognition software. Read the chart carefully and recognize, using context, where substitutions have occurred. Please call if you have any questions. \"    Counseling provided:                                  "

## 2024-10-21 NOTE — TELEPHONE ENCOUNTER
Reason for call:   [x] Refill   [] Prior Auth  [] Other:     Office:   [] PCP/Provider -   [x] Specialty/Provider - Psych    Medication: clonazePAM (KlonoPIN) 2 mg tablet     Dose/Frequency: Take 1 tablet (2 mg total) by mouth 2 (two) times a day     Quantity: 60    Pharmacy: [facility]/pharmacy #[id number] - [place] - [address] [place] [address] RD     Does the patient have enough for 3 days?   [x] Yes   [] No - Send as HP to POD

## 2024-10-21 NOTE — ASSESSMENT & PLAN NOTE
Juan José reports he continues to have depression due to going through a divorce and is concerned about what he will be left with.  He denies suicidal ideation takes Cymbalta 40 mg twice a day which seems to help.  Reports he is eating and sleeping.  Has limited supports from family.  Takes medication as prescribed.  Baclofen helps increase his mood and he is less depressed.

## 2024-10-22 NOTE — TELEPHONE ENCOUNTER
Medication:  PDMP  09/25/2024 09/23/2024 clonazePAM (Tablet) 60.0 30 2 MG [facility] Medicaid 0 / 0 [place]   1 [id number] 08/29/2024 08/26/2024 clonazePAM (Tablet) 60.0 30 2 MG [facility] Medicaid 0 / 0 [place]   1 [id number] 07/31/2024 07/31/2024 clonazePAM (Tablet) 60.0 30 2 MG [facility]  Active agreement on file -

## 2024-10-23 RX ORDER — CLONAZEPAM 2 MG/1
2 TABLET ORAL 2 TIMES DAILY
Qty: 60 TABLET | Refills: 0 | Status: SHIPPED | OUTPATIENT
Start: 2024-10-23

## 2024-10-28 DIAGNOSIS — G47.00 INSOMNIA, UNSPECIFIED TYPE: ICD-10-CM

## 2024-10-28 DIAGNOSIS — F41.9 ANXIETY: ICD-10-CM

## 2024-10-28 RX ORDER — TRAZODONE HYDROCHLORIDE 300 MG/1
300 TABLET ORAL
Qty: 90 TABLET | Refills: 0 | Status: SHIPPED | OUTPATIENT
Start: 2024-10-28

## 2024-11-04 DIAGNOSIS — F41.1 GAD (GENERALIZED ANXIETY DISORDER): ICD-10-CM

## 2024-11-04 DIAGNOSIS — F41.9 ANXIETY: ICD-10-CM

## 2024-11-04 RX ORDER — CLONAZEPAM 2 MG/1
2 TABLET ORAL 2 TIMES DAILY
Qty: 60 TABLET | Refills: 0 | OUTPATIENT
Start: 2024-11-04

## 2024-11-04 RX ORDER — QUETIAPINE FUMARATE 50 MG/1
50 TABLET, FILM COATED ORAL
Qty: 90 TABLET | Refills: 0 | Status: SHIPPED | OUTPATIENT
Start: 2024-11-04

## 2024-11-04 RX ORDER — BACLOFEN 10 MG/1
10 TABLET ORAL 2 TIMES DAILY PRN
Qty: 180 TABLET | Refills: 0 | Status: SHIPPED | OUTPATIENT
Start: 2024-11-04

## 2024-11-04 NOTE — TELEPHONE ENCOUNTER
Medication:  PDMP  10/23/2024 10/23/2024 clonazePAM (Tablet) 60.0 30 2 MG [facility] Medicaid 0 / 0 [place]   1 [id number] 09/25/2024 09/23/2024 clonazePAM (Tablet) 60.0 30 2 MG [facility] Medicaid 0 / 0 [place]   1 [id number] 08/29/2024 08/26/2024 clonazePAM (Tablet) 60.0 30 2 MG [facility]  Active agreement on file -

## 2024-11-04 NOTE — TELEPHONE ENCOUNTER
Reason for call:   [x] Refill   [] Prior Auth  [] Other:     Office:   [] PCP/Provider -   [x] Specialty/Provider - psych     Medication:     QUEtiapine (SEROquel) 50 mg tablet        baclofen 10 mg tablet        clonazePAM (KlonoPIN) 2 mg tablet       Dose/Frequency:   Sig: Take 1 tablet (50 mg total) by mouth daily after breakfast    Sig: Take 1 tablet (10 mg total) by mouth 2 (two) times a day as needed for muscle spasms (and anxiety)    Sig: Take 1 tablet (2 mg total) by mouth 2 (two) times a day    Quantity:   90  180  60    Pharmacy: [facility]/pharmacy #[id number] - [place] - [address] [place] [address]  [address] [facility] [address] [address]  Phone: [phone]  Fax: [phone]     Does the patient have enough for 3 days?   [] Yes   [x] No - Send as HP to POD

## 2024-11-15 ENCOUNTER — TELEPHONE (OUTPATIENT)
Dept: PSYCHIATRY | Facility: CLINIC | Age: 64
End: 2024-11-15

## 2024-11-19 DIAGNOSIS — F41.9 ANXIETY: ICD-10-CM

## 2024-11-19 RX ORDER — CLONAZEPAM 2 MG/1
2 TABLET ORAL 2 TIMES DAILY
Qty: 60 TABLET | Refills: 0 | Status: SHIPPED | OUTPATIENT
Start: 2024-11-19

## 2024-11-19 NOTE — TELEPHONE ENCOUNTER
Patient stated he dropped his pills in the toilet and only has enough for 3 days and needs enough until his next refill

## 2024-12-23 DIAGNOSIS — F41.9 ANXIETY: ICD-10-CM

## 2024-12-23 RX ORDER — CLONAZEPAM 2 MG/1
2 TABLET ORAL 2 TIMES DAILY
Qty: 60 TABLET | Refills: 0 | Status: SHIPPED | OUTPATIENT
Start: 2024-12-23

## 2024-12-23 NOTE — TELEPHONE ENCOUNTER
Reason for call:   [x] Refill   [] Prior Auth  [] Other:     Office:   [] PCP/Provider -   [x] Specialty/Provider - Psychiatry    Medication: clonazePAM (KlonoPIN) 2 mg tablet     Dose/Frequency:     2 mg, Oral, 2 times daily       Quantity: 60    Pharmacy: [facility]/pharmacy #[id number] - [place] - [address]     Does the patient have enough for 3 days?   [] Yes   [x] No - Send as HP to POD

## 2025-01-13 ENCOUNTER — OFFICE VISIT (OUTPATIENT)
Dept: PSYCHIATRY | Facility: CLINIC | Age: 65
End: 2025-01-13
Payer: COMMERCIAL

## 2025-01-13 VITALS
SYSTOLIC BLOOD PRESSURE: 167 MMHG | HEART RATE: 98 BPM | WEIGHT: 229 LBS | BODY MASS INDEX: 32.86 KG/M2 | DIASTOLIC BLOOD PRESSURE: 90 MMHG

## 2025-01-13 DIAGNOSIS — F41.1 GAD (GENERALIZED ANXIETY DISORDER): Primary | ICD-10-CM

## 2025-01-13 DIAGNOSIS — F33.9 EPISODE OF RECURRENT MAJOR DEPRESSIVE DISORDER, UNSPECIFIED DEPRESSION EPISODE SEVERITY (HCC): ICD-10-CM

## 2025-01-13 PROCEDURE — 99214 OFFICE O/P EST MOD 30 MIN: CPT | Performed by: NURSE PRACTITIONER

## 2025-01-13 PROCEDURE — 90833 PSYTX W PT W E/M 30 MIN: CPT | Performed by: NURSE PRACTITIONER
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
## 2025-01-13 NOTE — BH TREATMENT PLAN
TREATMENT PLAN (Medication Management Only)         [facility]     Name and Date of Birth:  Juan José Corrales 64 y.o. 1960  Date of Treatment Plan: August 1, 2024, January 13, 2025  Diagnosis/Diagnoses:    1. OSITO (generalized anxiety disorder)    2. Episode of recurrent major depressive disorder, unspecified depression episode severity (HCC)       Strengths/Personal Resources for Self-Care: taking medications as prescribed, ability to communicate needs.  Area/Areas of need (in own words): anxiety, depression  1.Long Term Goal: increase mood stability.  Target Date:6 months - 7/13/2025  Person/Persons responsible for completion of goal: feli Can  2.         Short Term Objective (s) - How will we reach this goal?:   A. Provider new recommended medication/dosage changes and/or continue medication(s): continue current medications as prescribed.  B.  Coping skills .  C.  Adequate grass nutrition demonstration .  Target Date:6 months - 7/13/2025  Person/Persons Responsible for Completion of Goal: rebecca Can  Progress Towards Goals: continuing treatment  Treatment Modality: medication management every 3 months  Review due 180 days from date of this plan: 6 months - 7/13/2025  Expected length of service: maintenance  My Physician/PA/NP and I have developed this plan together and I agree to work on the goals and objectives. I understand the treatment goals that were developed for my treatment.

## 2025-01-13 NOTE — ASSESSMENT & PLAN NOTE
Klonopin 2 mg BID, Seroquel 100mg 2 tab at hs Trazodone 300 mg at hs    Juan José reports increased anxiety due to divorce and what money he will be left with. He reports he is still in the home and paying for everything. Wife continues to borrow money from him and not pay it back. Medication helps with his anxiety. Denies panic attacks. Has a cousin who is supportive. Requested a letter to ask  for 30 more days to fill out forms. SANDI obtained. Appetite is good and he is sleeping. AIMS negative.

## 2025-01-13 NOTE — LETTER
January 13, 2025     Patient: Juan José Corrales  YOB: 1960  Date of Visit: 1/13/2025      To Whom it May Concern:    Juan José Corrales is under my professional care for Generalized Anxiety Disorder and Recurrent Major Depressive Disorder. Juan José was seen in my office on 1/13/2025 and becomes easily overwhelmed with tasks, especially completing requested divorce documents. Please allow Juan José thirty more days to complete the paperwork.    If you have any questions or concerns, please don't hesitate to call.         Sincerely,          Emily London, PhD, MPH, APRN        CC: Juan José Corrales

## 2025-01-13 NOTE — ASSESSMENT & PLAN NOTE
Cymbalta 20 mg 2 capsules BID    Reports depression is situational due to divorce. Denies SI. Reports decreased motivation. Support was provided.

## 2025-01-17 DIAGNOSIS — F41.9 ANXIETY: ICD-10-CM

## 2025-01-17 DIAGNOSIS — G47.00 INSOMNIA, UNSPECIFIED TYPE: ICD-10-CM

## 2025-01-17 RX ORDER — TRAZODONE HYDROCHLORIDE 300 MG/1
300 TABLET ORAL
Qty: 90 TABLET | Refills: 0 | Status: SHIPPED | OUTPATIENT
Start: 2025-01-17

## 2025-01-17 NOTE — TELEPHONE ENCOUNTER
Reason for call:   [x] Refill   [] Prior Auth  [] Other:     Office:   [] PCP/Provider -   [x] Specialty/Provider - PSYCHIATRY POD  Authorized By: Emily London, PhD      Medication: traZODone (DESYREL) 300 MG tablet    Dose/Frequency: TAKE 1 TABLET BY MOUTH AT BEDTIME,    Quantity:  90 tablet    Pharmacy: [facility]/pharmacy #[id number] - [place] - [address] [place] [address]     Does the patient have enough for 3 days?   [x] Yes   [] No - Send as HP to POD

## 2025-01-21 DIAGNOSIS — F41.9 ANXIETY: ICD-10-CM

## 2025-01-21 RX ORDER — QUETIAPINE FUMARATE 50 MG/1
50 TABLET, FILM COATED ORAL
Qty: 90 TABLET | Refills: 0 | Status: SHIPPED | OUTPATIENT
Start: 2025-01-21

## 2025-01-21 RX ORDER — CLONAZEPAM 2 MG/1
2 TABLET ORAL 2 TIMES DAILY
Qty: 60 TABLET | Refills: 0 | Status: SHIPPED | OUTPATIENT
Start: 2025-01-21

## 2025-01-21 NOTE — TELEPHONE ENCOUNTER
Reason for call:   [x] Refill   [] Prior Auth  [] Other:     Office:   [] PCP/Provider -   [x] Specialty/Provider - janee London Psych [facility]    Medication: Clonazepam 2mg 1 tablet 2 times daily #60                                     Quetiapine 50mg  1 tablet daily after breakfast #90                              [id number] [address]    Does the patient have enough for 3 days?   [x] Yes   [] No - Send as HP to POD

## 2025-01-21 NOTE — TELEPHONE ENCOUNTER
12/23/2024 12/23/2024 clonazePAM (Tablet) 60.0 30 2 MG NA [facility] / 0 [place]   1 [id number] 11/19/2024 11/19/2024 clonazePAM (Tablet) 60.0 30 2 MG NA [facility], [facility] Medicaid 0 / 0 [place]   1 [id number] 10/23/2024 10/23/2024 clonazePAM (Tablet) 60.0 30 2 MG NA [facility], [facility]. Medicaid 0 / 0 [place]   1 [id number] 09/25/2024 09/23/2024 clonazePAM (Tablet) 60.0 30 2 MG [facility] / 0 [place]   1 [id number] 08/29/2024 08/26/2024 clonazePAM (Tablet) 60.0 30 2 MG [facility], [facility]C. Medicaid 0 / 0 [place]   1 [id number] 07/31/2024 07/31/2024 clonazePAM (Tablet) 60.0 30 2 MG NA [facility], [facility]C. Medicaid 0 / 0 [place]   1 [id number] 06/28/2024 06/28/2024 clonazePAM (Tablet) 60.0 30 2 MG NA [facility]

## 2025-01-29 DIAGNOSIS — F41.1 GAD (GENERALIZED ANXIETY DISORDER): ICD-10-CM

## 2025-01-29 RX ORDER — QUETIAPINE FUMARATE 100 MG/1
200 TABLET, FILM COATED ORAL
Qty: 180 TABLET | Refills: 1 | Status: SHIPPED | OUTPATIENT
Start: 2025-01-29

## 2025-01-31 DIAGNOSIS — F33.1 MODERATE EPISODE OF RECURRENT MAJOR DEPRESSIVE DISORDER (HCC): ICD-10-CM

## 2025-01-31 DIAGNOSIS — F41.1 GAD (GENERALIZED ANXIETY DISORDER): ICD-10-CM

## 2025-01-31 RX ORDER — DULOXETIN HYDROCHLORIDE 20 MG/1
40 CAPSULE, DELAYED RELEASE ORAL 2 TIMES DAILY
Qty: 360 CAPSULE | Refills: 0 | Status: SHIPPED | OUTPATIENT
Start: 2025-01-31

## 2025-02-02 DIAGNOSIS — F41.1 GAD (GENERALIZED ANXIETY DISORDER): ICD-10-CM

## 2025-02-03 RX ORDER — BACLOFEN 10 MG/1
10 TABLET ORAL 2 TIMES DAILY PRN
Qty: 180 TABLET | Refills: 0 | Status: SHIPPED | OUTPATIENT
Start: 2025-02-03

## 2025-02-20 DIAGNOSIS — F41.9 ANXIETY: ICD-10-CM

## 2025-02-20 RX ORDER — CLONAZEPAM 2 MG/1
2 TABLET ORAL 2 TIMES DAILY
Qty: 60 TABLET | Refills: 0 | Status: SHIPPED | OUTPATIENT
Start: 2025-02-20

## 2025-02-20 NOTE — TELEPHONE ENCOUNTER
Reason for call:   [x] Refill   [] Prior Auth  [] Other:     Office:   [] PCP/Provider -   [x] Specialty/Provider - [facility] : Emily London     Medication: Klonopin     Dose/Frequency: 2 mg     Quantity: #60    Pharmacy: [address]    Does the patient have enough for 3 days?   [x] Yes   [] No - Send as HP to POD

## 2025-02-20 NOTE — TELEPHONE ENCOUNTER
Refill cannot be delegated    1 [id number] 01/22/2025 01/21/2025 clonazePAM (Tablet) 60.0 30 2 MG [facility] Commercial [place] 0 / 0 [place]   1 [id number] 12/23/2024 12/23/2024 clonazePAM (Tablet) 60.0 30 2 MG [facility], [facility] Medicaid 0 / 0 [place]   1 [id number] 11/19/2024 11/19/2024 clonazePAM (Tablet) 60.0 30 2 MG [facility] Medicaid 0 / 0 [place]   1 [id number] 10/23/2024 10/23/2024 clonazePAM (Tablet) 60.0 30 2 MG [facility], [facility] [place] 0 / 0 [place]   1 [id number] 09/25/2024 09/23/2024 clonazePAM (Tablet) 60.0 30 2 MG [facility], [facility] [place] 0 / 0 [place]   1 [id number] 08/29/2024 08/26/2024 clonazePAM (Tablet) 60.0 30 2 MG [facility], [facility] Medicaid 0 / 0 [place]   1 [id number] 07/31/2024 07/31/2024 clonazePAM (Tablet) 60.0 30 2 MG [facility] [place] 0 / 0 [place]   1 [id number] 06/28/2024 06/28/2024 clonazePAM (Tablet) 60.0 30 2 MG  [facility], [facility] [place] 0 / 0 [place]   1 [id number] 05/28/2024 05/28/2024 clonazePAM (Tablet) 60.0 30 2 MG [facility], [facility] Medicaid 0 / 0 [place]   1 [id number] 04/29/2024 04/29/2024 clonazePAM (Tablet) 60.0 30 2 MG [facility], [facility]. Medicaid 0 / 0 [place]

## 2025-03-14 DIAGNOSIS — F41.1 GAD (GENERALIZED ANXIETY DISORDER): ICD-10-CM

## 2025-03-14 NOTE — TELEPHONE ENCOUNTER
Called pharmacy and was informed that medication was put back however script is still active and they will get it ready for patient.    Called Adiel and ALYSSA oh his VM that he can pick his medication up later today.

## 2025-03-14 NOTE — TELEPHONE ENCOUNTER
Patient requesting script be resent to pharmacy. Pharmacy tired to fill for him previously but he did not need at the time so he had them cancel the refill. Needs script now.     Reason for call:   [x] Refill   [] Prior Auth  [] Other:     Office:   [] PCP/Provider -   [x] Specialty/Provider - Psych sonali/ Emily barksdale, PhD    Medication: baclofen 10 mg tablet     Dose/Frequency: TAKE 1 TABLET (10 MG TOTAL) BY MOUTH 2 (TWO) TIMES A DAY AS NEEDED FOR MUSCLE SPASMS (AND ANXIETY),     Quantity: 180    Pharmacy: [facility]/pharmacy #[id number] - [place] - [address] [place] [address] Pharmacy   Does the patient have enough for 3 days?   [] Yes   [x] No - Send as HP to POD    Mail Away Pharmacy   Does the patient have enough for 10 days?   [] Yes   [] No - Send as HP to POD

## 2025-03-17 RX ORDER — BACLOFEN 10 MG/1
10 TABLET ORAL 2 TIMES DAILY PRN
Qty: 180 TABLET | Refills: 0 | Status: SHIPPED | OUTPATIENT
Start: 2025-03-17

## 2025-03-24 DIAGNOSIS — F41.9 ANXIETY: ICD-10-CM

## 2025-03-24 RX ORDER — CLONAZEPAM 2 MG/1
2 TABLET ORAL 2 TIMES DAILY
Qty: 60 TABLET | Refills: 0 | Status: SHIPPED | OUTPATIENT
Start: 2025-03-24

## 2025-03-24 NOTE — TELEPHONE ENCOUNTER
02/20/2025 02/20/2025 clonazePAM (Tablet) 60.0 30 2 MG [facility] Commercial Insurance 0 / 0 [place]   1 [id number] 01/22/2025 01/21/2025 clonazePAM (Tablet) 60.0 30 2 MG [facility] Commercial Insurance 0 / 0 [place]   1 [id number] 12/23/2024 12/23/2024 clonazePAM (Tablet) 60.0 30 2 MG [facility] Medicaid 0 / 0 [place]   1 [id number] 11/19/2024 11/19/2024 clonazePAM (Tablet) 60.0 30 2 MG [facility], [facility] Medicaid 0 / 0 [place]   1 [id number] 10/23/2024 10/23/2024 clonazePAM (Tablet) 60.0 30 2 MG [facility], [facility] Medicaid 0 / 0 [place]   1 [id number] 09/25/2024 09/23/2024 clonazePAM (Tablet) 60.0 30 2 MG [facility] / 0 [place]   1 [id number] 08/29/2024 08/26/2024 clonazePAM (Tablet) 60.0 30 2

## 2025-03-24 NOTE — TELEPHONE ENCOUNTER
Reason for call:   [x] Refill   [] Prior Auth  [] Other:     Office:   [x] PCP/Provider -   [] Specialty/Provider -     Medication: clonazePAM (KlonoPIN) 2 mg     Dose/Frequency:  Take 1 tablet (2 mg total) by mouth 2 (two) times a day     Quantity: 60    [facility] [address]    Local Pharmacy   Does the patient have enough for 3 days?   [x] Yes   [] No - Send as HP to POD    Mail Away Pharmacy   Does the patient have enough for 10 days?   [] Yes   [] No - Send as HP to POD

## 2025-04-07 DIAGNOSIS — F41.9 ANXIETY: ICD-10-CM

## 2025-04-07 RX ORDER — QUETIAPINE FUMARATE 50 MG/1
50 TABLET, FILM COATED ORAL
Qty: 90 TABLET | Refills: 0 | Status: SHIPPED | OUTPATIENT
Start: 2025-04-07 | End: 2025-04-10 | Stop reason: SDUPTHER

## 2025-04-07 NOTE — TELEPHONE ENCOUNTER
Reason for call:   [x] Refill   [] Prior Auth  [] Other:     Office:   [] PCP/Provider -   [x] Specialty/Provider - Psych/ Surya PhD    Medication: QUEtiapine (SEROquel) 50 mg tablet     Dose/Frequency: Take 1 tablet (50 mg total) by mouth daily after breakfast    Quantity: 90    Pharmacy: [facility]/pharmacy #[id number] - [place] - [address] [phone]    Local Pharmacy   Does the patient have enough for 3 days?   [] Yes   [x] No - Send as HP to POD    Mail Away Pharmacy   Does the patient have enough for 10 days?   [] Yes   [] No - Send as HP to POD

## 2025-04-09 ENCOUNTER — NURSE TRIAGE (OUTPATIENT)
Dept: OTHER | Facility: OTHER | Age: 65
End: 2025-04-09

## 2025-04-09 NOTE — TELEPHONE ENCOUNTER
"Regarding: QUEtiapine (SEROquel) 50 mg tablet  ----- Message from Ousmane NDIAYE sent at 4/9/2025  6:09 PM EDT -----  \"I need a refill for my QUEtiapine (SEROquel) 50 mg tablet send to [facility].\"    "
Attempted to call patient back 3x times, left message to give the after hours call center a call back.  
Alert-The patient is alert, awake and responds to voice. The patient is oriented to time, place, and person. The triage nurse is able to obtain subjective information.

## 2025-04-10 ENCOUNTER — TELEPHONE (OUTPATIENT)
Age: 65
End: 2025-04-10

## 2025-04-10 DIAGNOSIS — F41.9 ANXIETY: ICD-10-CM

## 2025-04-10 DIAGNOSIS — F33.1 MODERATE EPISODE OF RECURRENT MAJOR DEPRESSIVE DISORDER (HCC): ICD-10-CM

## 2025-04-10 DIAGNOSIS — F41.1 GAD (GENERALIZED ANXIETY DISORDER): ICD-10-CM

## 2025-04-10 RX ORDER — QUETIAPINE FUMARATE 50 MG/1
50 TABLET, FILM COATED ORAL
Qty: 90 TABLET | Refills: 0 | Status: SHIPPED | OUTPATIENT
Start: 2025-04-10

## 2025-04-10 RX ORDER — DULOXETIN HYDROCHLORIDE 20 MG/1
40 CAPSULE, DELAYED RELEASE ORAL 2 TIMES DAILY
Qty: 360 CAPSULE | Refills: 0 | Status: SHIPPED | OUTPATIENT
Start: 2025-04-10

## 2025-04-10 NOTE — TELEPHONE ENCOUNTER
Patient is calling regarding cancelling an appointment.    Date/Time: 4/10 @ 2:30    Reason: patient    Patient was rescheduled: YES [x] NO []  If yes, when was Patient reschedule for: 4/22 @ 2    Patient requesting call back to reschedule: YES [] NO [x]

## 2025-04-10 NOTE — TELEPHONE ENCOUNTER
Reason for call:   [x] Refill   [] Prior Auth  [] Other:     Office:   [] PCP/Provider -   [x] Specialty/Provider - Emily London,     Medication: DULoxetine (CYMBALTA) 20 mg   Dose/Frequency: Take 2 capsules (40 mg total) by mouth 2 (two) times a day,   Quantity: 360    RESEND- Pharmacy does not have order from 4/7/25  QUEtiapine (SEROquel) 50 mg   1 daily, #90    Pharmacy: [address] (LAST TIME)    Local Pharmacy   Does the patient have enough for 3 days?   [] Yes   [x] No - Send as HP to POD    Mail Away Pharmacy   Does the patient have enough for 10 days?   [] Yes   [] No - Send as HP to POD

## 2025-04-22 ENCOUNTER — OFFICE VISIT (OUTPATIENT)
Dept: PSYCHIATRY | Facility: CLINIC | Age: 65
End: 2025-04-22
Payer: COMMERCIAL

## 2025-04-22 VITALS
WEIGHT: 228.8 LBS | SYSTOLIC BLOOD PRESSURE: 164 MMHG | DIASTOLIC BLOOD PRESSURE: 104 MMHG | HEART RATE: 83 BPM | HEIGHT: 70 IN | BODY MASS INDEX: 32.75 KG/M2

## 2025-04-22 DIAGNOSIS — F33.9 EPISODE OF RECURRENT MAJOR DEPRESSIVE DISORDER, UNSPECIFIED DEPRESSION EPISODE SEVERITY (HCC): ICD-10-CM

## 2025-04-22 DIAGNOSIS — F41.9 ANXIETY: ICD-10-CM

## 2025-04-22 DIAGNOSIS — Z79.899 ENCOUNTER FOR LONG-TERM (CURRENT) USE OF HIGH-RISK MEDICATION: Primary | ICD-10-CM

## 2025-04-22 DIAGNOSIS — F41.1 GAD (GENERALIZED ANXIETY DISORDER): ICD-10-CM

## 2025-04-22 PROCEDURE — 99214 OFFICE O/P EST MOD 30 MIN: CPT | Performed by: NURSE PRACTITIONER

## 2025-04-22 PROCEDURE — 90833 PSYTX W PT W E/M 30 MIN: CPT | Performed by: NURSE PRACTITIONER

## 2025-04-22 RX ORDER — CLONAZEPAM 2 MG/1
2 TABLET ORAL 2 TIMES DAILY
Qty: 60 TABLET | Refills: 0 | Status: SHIPPED | OUTPATIENT
Start: 2025-04-22

## 2025-05-05 NOTE — PSYCH
MEDICATION MANAGEMENT NOTE        [facility] - [facility]      Name and Date of Birth:  Juan José Corrales 64 y.o. 1960 MRN: [id number]    Insurance: Payor: BRIJESH / Plan: [facility] HEALTH BENEFIT PLAN / Product Type: Fee for Service Commercial /     Date of Visit: April 22, 2025  This note was not shared with the patient due to this is a psychotherapy note  Reason for Visit:   Chief Complaint   Patient presents with    Mood Swings    Depression    Anxiety    Medication Management       Assessment & Plan  Anxiety  Juan José reports anxiety continues regarding impending divorce. Medication helps control anxiety and panic. Reports they are still in their home and estranged wife does not talk to him. He is distressed due to not knowing what will happen in future. Supportive therapy provided  Orders:    clonazePAM (KlonoPIN) 2 mg tablet; Take 1 tablet (2 mg total) by mouth 2 (two) times a day    Encounter for long-term (current) use of high-risk medication    Orders:    Comprehensive metabolic panel; Future    Comprehensive metabolic panel; Future    Vitamin D 25 hydroxy; Future    TSH, 3rd generation with Free T4 reflex; Future    Prolactin; Future    Lipid panel; Future    CBC and differential; Future    OSITO (generalized anxiety disorder)  Reports anxiety continues see above       Episode of recurrent major depressive disorder, unspecified depression episode severity (HCC)  Depression is reported, denies SI, appetite is fair and is sleeping Seroquel helps, AIMS negative. Cymbalta and trazodone are reported as effective.  Has a few friends who are supportive.       Risks/benefits/alternativies to treatment discussed, including potential adverse medication side effects, to which Juan José voiced understanding and consented fully to treatment.  Also, patient is amenable to calling/contacting the outpatient office including this writer if any acute adverse effects of their medication regimen  arise in addition to any comments or concerns pertaining to their psychiatric management.      Medications Risks/Benefits      Risks, Benefits And Possible Side Effects Of Medications:    Risks, benefits, and possible side effects of medications explained to Juan José and he verbalizes understanding and agreement for treatment.    Controlled Medication Discussion:     Juan José has been filling controlled prescriptions on time as prescribed according to [place] Prescription Drug Monitoring Program         Subjective      Juan José Corrales is a 64 y.o. male, visited for Mood Swings, Depression, Anxiety, and Medication Management, who was personally seen and evaluated today at the [facility] outpatient clinic for follow-up and medication management. Completes psychiatric assessment without difficulty.     At previous outpatient psychiatric appointment with this writer, germania reports increased anxiety due to divorce and what money he will be left with. He reports he is still in the home and paying for everything. Wife continues to borrow money from him and not pay it back. Medication helps with his anxiety. Denies panic attacks. Has a cousin who is supportive. Requested a letter to ask  for 30 more days to fill out forms. SANDI obtained. Appetite is good and he is sleeping. AIMS negative. He denies any current adverse medication side effects.      Overall, Juan José is not at goal; however, he is coping with situational anxiety and depression without Si. Takes medication as prescribed.     Review Of Systems:  Pertinent items are noted in HPI; all others are negative; no recent changes in medications or health status reported.    PHQ-2/9 Depression Screening                 Historical Information    Past Psychiatric History Update:   - No inpatient psychiatric admission since last encounter  - No SA or SIB since last encounter  - No incidence of violent behavior since last encounter    Past Trauma  "History Update:   - No new onset of abuse or traumatic events since last encounter     Past Medical History:    Past Medical History:   Diagnosis Date    Anxiety     BPH (benign prostatic hyperplasia)     Depressed     Hayfever     Inguinal hernia     last assessed: 1/21/16    Mild depressive disorder     last assessed: 2/24/16    Suicide attempt (HCC)         Past Surgical History:   Procedure Laterality Date    HERNIA REPAIR Bilateral     5 months ago.     KNEE ARTHROSCOPY Left     Possible ACL repair    KNEE SURGERY      ROTATOR CUFF REPAIR Left     about 1 year ago     Allergies   Allergen Reactions    Amoxicillin Visual Disturbance     Reaction Date: 10Mar2008;     Motrin [Ibuprofen]      Reaction Date: 10Mar2008;   Lips swelled    Other      Pt \"I think the antibiotic is Avelox\"       Substance Abuse History:    Social History     Substance and Sexual Activity   Alcohol Use Yes    Comment: rarely; social     Social History     Substance and Sexual Activity   Drug Use Yes    Types: Marijuana    Comment: soically       Social History:    Social History     Socioeconomic History    Marital status: /Civil Union     Spouse name: Not on file    Number of children: Not on file    Years of education: Not on file    Highest education level: Not on file   Occupational History    Occupation: retired     Tobacco Use    Smoking status: Former    Smokeless tobacco: Never    Tobacco comments:     quit about 1-2 years ago.     Substance and Sexual Activity    Alcohol use: Yes     Comment: rarely; social    Drug use: Yes     Types: Marijuana     Comment: soically    Sexual activity: Not Currently   Other Topics Concern    Not on file   Social History Narrative    Daily coffee consumption     Social Drivers of Health     Financial Resource Strain: Not on file   Food Insecurity: Not on file   Transportation Needs: Not on file   Physical Activity: Not on file   Stress: Not on file   Social Connections: Not on " "file   Intimate Partner Violence: Not on file   Housing Stability: Not on file       Family Psychiatric History:     Family History   Problem Relation Age of Onset    Suicide Attempts Father     Depression Father     Hyperlipidemia Maternal Aunt     Stroke Maternal Aunt     Cervical cancer Family        History Review: The following portions of the patient's history were reviewed and updated as appropriate: allergies, current medications, past family history, past medical history, past social history, past surgical history and problem list.           Objective      Vital signs in last 24 hours:  Vitals:    04/22/25 1359   BP: (!) 164/104   Pulse: 83   Weight: 104 kg (228 lb 12.8 oz)   Height: 5' 10\" (1.778 m)       Mental Status Evaluation:    Appearance age appropriate, casually dressed   Behavior cooperative, calm   Speech normal rate, normal volume, normal pitch   Mood depressed, anxious   Affect normal range and intensity, appropriate   Thought Processes organized, goal directed   Associations intact associations   Thought Content no overt delusions   Perceptual Disturbances: no auditory hallucinations, no visual hallucinations   Abnormal Thoughts  Risk Potential Suicidal ideation - None  Homicidal ideation - None  Potential for aggression - No   Orientation oriented to: person, place, time/date, and situation   Memory recent and remote memory grossly intact   Consciousness alert and awake   Attention Span Concentration Span attention span and concentration are age appropriate   Intellect not formally assessed   Insight intact   Judgement intact   Muscle Strength and  Gait normal muscle strength and normal muscle tone, normal gait and normal balance   Motor activity no abnormal movements   Language no difficulty naming common objects   Fund of Knowledge adequate knowledge of current events   Pain none   Pain Scale 0             Current Outpatient Medications   Medication Sig Dispense Refill    clonazePAM " (KlonoPIN) 2 mg tablet Take 1 tablet (2 mg total) by mouth 2 (two) times a day 60 tablet 0    baclofen 10 mg tablet Take 1 tablet (10 mg total) by mouth 2 (two) times a day as needed for muscle spasms (and anxiety) 180 tablet 0    DULoxetine (CYMBALTA) 20 mg capsule Take 2 capsules (40 mg total) by mouth 2 (two) times a day 360 capsule 0    ergocalciferol (VITAMIN D2) 50,000 units Take 1 capsule (50,000 Units total) by mouth once a week 12 capsule 0    QUEtiapine (SEROquel) 100 mg tablet TAKE 2 TABLETS (200 MG TOTAL) BY MOUTH DAILY AT BEDTIME 180 tablet 1    QUEtiapine (SEROquel) 50 mg tablet Take 1 tablet (50 mg total) by mouth daily after breakfast 90 tablet 0    traZODone (DESYREL) 300 MG tablet Take 1 tablet (300 mg total) by mouth daily at bedtime 90 tablet 0    vitamin A 2250 MCG (7500 UT) capsule Take 7,500 Units by mouth daily       No current facility-administered medications for this visit.         Psychotherapy Provided:     Individual psychotherapy provided: Yes   Supportive therapy  Medication evaluation  Mood assessment  Surveys reviewed and confirmed  Normalized and validated his feelings  Safety plan not compiled; however, he is aware of who to call in crisis, 988 and ED if necessary      Visit Time    Visit Start Time: 2:00   Visit Stop Time: 2:30  Total Visit Duration:  30 minutes    Emily London, PhD 05/05/25    This note was completed in part utilizing Dragon dictation Software. Grammatical, translation, syntax errors, random word insertions, spelling mistakes, and incomplete sentences may be an occasional consequence of this system secondary to software limitations with voice recognition, ambient noise, and hardware issues. If you have any questions or concerns about the content, text, or information contained within the body of this dictation, please contact the provider for clarification.

## 2025-05-22 DIAGNOSIS — F41.9 ANXIETY: ICD-10-CM

## 2025-05-22 RX ORDER — CLONAZEPAM 2 MG/1
2 TABLET ORAL 2 TIMES DAILY
Qty: 60 TABLET | Refills: 0 | Status: SHIPPED | OUTPATIENT
Start: 2025-05-22

## 2025-05-22 NOTE — TELEPHONE ENCOUNTER
Reason for call:   [x] Refill   [] Prior Auth  [] Other:     Office:   [] PCP/Provider -   [x] [facility]     Medication: clonazePAM (KlonoPIN) 2 mg tablet     Dose/Frequency: 2 mg, 2 times daily     Quantity: 60    [id number]    Local Pharmacy   Does the patient have enough for 3 days?   [x] Yes   [] No - Send as HP to POD    Mail Away Pharmacy   Does the patient have enough for 10 days?   [] Yes   [] No - Send as HP to POD

## 2025-05-27 DIAGNOSIS — F41.1 GAD (GENERALIZED ANXIETY DISORDER): ICD-10-CM

## 2025-05-27 DIAGNOSIS — F33.1 MODERATE EPISODE OF RECURRENT MAJOR DEPRESSIVE DISORDER (HCC): ICD-10-CM

## 2025-05-27 RX ORDER — DULOXETIN HYDROCHLORIDE 20 MG/1
40 CAPSULE, DELAYED RELEASE ORAL 2 TIMES DAILY
Qty: 360 CAPSULE | Refills: 0 | Status: CANCELLED | OUTPATIENT
Start: 2025-05-27

## 2025-05-29 ENCOUNTER — TELEPHONE (OUTPATIENT)
Dept: OTHER | Facility: HOSPITAL | Age: 65
End: 2025-05-29

## 2025-05-29 DIAGNOSIS — F33.1 MODERATE EPISODE OF RECURRENT MAJOR DEPRESSIVE DISORDER (HCC): ICD-10-CM

## 2025-05-29 DIAGNOSIS — F41.1 GAD (GENERALIZED ANXIETY DISORDER): ICD-10-CM

## 2025-05-29 NOTE — TELEPHONE ENCOUNTER
Patient called the RX Refill Line. Message is being forwarded to the office.     Patient is requesting rx refill on duloxetine 20 mg pt state he is taking 3 times am and 3 times in the pm    Please contact patient at [phone]      Please update pt's record and call pt [phone]

## 2025-05-30 NOTE — TELEPHONE ENCOUNTER
Called Juan José and left  requesting a call back. Upon return call will see if he meant two tabs (which is prescribed). If he meant three tabs will ask if he has enough medication through the weekend so Dr. London can review when she returns on Monday.

## 2025-06-02 ENCOUNTER — TELEPHONE (OUTPATIENT)
Age: 65
End: 2025-06-02

## 2025-06-02 DIAGNOSIS — F33.1 MODERATE EPISODE OF RECURRENT MAJOR DEPRESSIVE DISORDER (HCC): ICD-10-CM

## 2025-06-02 DIAGNOSIS — F41.1 GAD (GENERALIZED ANXIETY DISORDER): ICD-10-CM

## 2025-06-02 RX ORDER — DULOXETIN HYDROCHLORIDE 20 MG/1
40 CAPSULE, DELAYED RELEASE ORAL 2 TIMES DAILY
Qty: 360 CAPSULE | Refills: 0 | Status: SHIPPED | OUTPATIENT
Start: 2025-06-02 | End: 2025-06-11 | Stop reason: DRUGHIGH

## 2025-06-02 NOTE — TELEPHONE ENCOUNTER
Patient calling in to confirm appt for tomorrow 6/3 with Emily London. Patient has no further questions at this time.

## 2025-06-02 NOTE — TELEPHONE ENCOUNTER
"Juan José states he has been taking generic Cymbalta - 60 mg BID.    \"This is what was discussed at my last visit. I feel better since the increase from 40 mg BID.\"      \" I will discuss this with Dr. London tomorrow a my appt.\"  "

## 2025-06-03 ENCOUNTER — TELEPHONE (OUTPATIENT)
Age: 65
End: 2025-06-03

## 2025-06-03 NOTE — TELEPHONE ENCOUNTER
Patient is calling regarding cancelling an appointment.    Date/Time: 6/3/25 at 2pm    Reason: no reason provided    Patient was rescheduled: YES [x] NO []  If yes, when was Patient reschedule for: 6/11/25 at 2:30pm    Patient requesting call back to reschedule: YES [] NO [x]

## 2025-06-10 ENCOUNTER — TELEPHONE (OUTPATIENT)
Dept: PSYCHIATRY | Facility: CLINIC | Age: 65
End: 2025-06-10

## 2025-06-11 ENCOUNTER — OFFICE VISIT (OUTPATIENT)
Dept: PSYCHIATRY | Facility: CLINIC | Age: 65
End: 2025-06-11
Payer: COMMERCIAL

## 2025-06-11 DIAGNOSIS — F33.1 MODERATE EPISODE OF RECURRENT MAJOR DEPRESSIVE DISORDER (HCC): Primary | ICD-10-CM

## 2025-06-11 DIAGNOSIS — F41.1 GAD (GENERALIZED ANXIETY DISORDER): ICD-10-CM

## 2025-06-11 PROCEDURE — 90833 PSYTX W PT W E/M 30 MIN: CPT | Performed by: NURSE PRACTITIONER

## 2025-06-11 PROCEDURE — 99214 OFFICE O/P EST MOD 30 MIN: CPT | Performed by: NURSE PRACTITIONER

## 2025-06-11 RX ORDER — DULOXETIN HYDROCHLORIDE 30 MG/1
60 CAPSULE, DELAYED RELEASE ORAL 2 TIMES DAILY
Qty: 120 CAPSULE | Refills: 3 | Status: SHIPPED | OUTPATIENT
Start: 2025-06-11 | End: 2025-06-16 | Stop reason: SDUPTHER

## 2025-06-11 RX ORDER — DULOXETIN HYDROCHLORIDE 30 MG/1
60 CAPSULE, DELAYED RELEASE ORAL 2 TIMES DAILY
Qty: 120 CAPSULE | Refills: 3 | Status: SHIPPED | OUTPATIENT
Start: 2025-06-11 | End: 2025-06-11 | Stop reason: SDUPTHER

## 2025-06-16 DIAGNOSIS — F41.9 ANXIETY: ICD-10-CM

## 2025-06-16 DIAGNOSIS — G47.00 INSOMNIA, UNSPECIFIED TYPE: ICD-10-CM

## 2025-06-16 DIAGNOSIS — F33.1 MODERATE EPISODE OF RECURRENT MAJOR DEPRESSIVE DISORDER (HCC): ICD-10-CM

## 2025-06-16 RX ORDER — DULOXETIN HYDROCHLORIDE 30 MG/1
60 CAPSULE, DELAYED RELEASE ORAL 2 TIMES DAILY
Qty: 120 CAPSULE | Refills: 0 | Status: SHIPPED | OUTPATIENT
Start: 2025-06-16

## 2025-06-16 RX ORDER — TRAZODONE HYDROCHLORIDE 300 MG/1
300 TABLET ORAL
Qty: 90 TABLET | Refills: 0 | Status: SHIPPED | OUTPATIENT
Start: 2025-06-16

## 2025-06-16 NOTE — TELEPHONE ENCOUNTER
Reason for call:      Pharmacy has no refills-Duloxetine never sent over 6/11/25  [x] Refill   [] Prior Auth  [] Other:     Office:   [] PCP/Provider -   [x] Specialty/Provider - Emily London, PhD   [facility]     Medication: DULoxetine (Cymbalta) 30 mg     2 caps(60 mg total) 2 (two) times a day, #120                            traZODone (DESYREL) 300 MG    Daily at bedtime   #90        Pharmacy: [id number]  [facility]   Does the patient have enough for 3 days?   [] Yes   [x] No - Send as HP to POD    Mail Away Pharmacy   Does the patient have enough for 10 days?   [] Yes   [] No - Send as HP to POD

## 2025-06-23 DIAGNOSIS — F41.9 ANXIETY: ICD-10-CM

## 2025-06-23 RX ORDER — CLONAZEPAM 2 MG/1
2 TABLET ORAL 2 TIMES DAILY
Qty: 60 TABLET | Refills: 0 | Status: SHIPPED | OUTPATIENT
Start: 2025-06-23

## 2025-06-23 NOTE — TELEPHONE ENCOUNTER
Refill cannot be delegated    1 [id number] ** 05/22/2025 05/22/2025 clonazePAM (Tablet) 60.0 30 2 MG [facility] Commercial Insurance 0 / 0 [place]   1 [id number] ** 04/22/2025 04/22/2025 clonazePAM (Tablet) 60.0 30 2 MG [facility] Commercial Insurance 0 / 0 [place]   1 [id number] ** 03/24/2025 03/24/2025 clonazePAM (Tablet) 60.0 30 2 MG [facility] Commercial Insurance 0 / 0 [place]   1 [id number] ** 02/20/2025 02/20/2025 clonazePAM (Tablet) 60.0 30 2 MG [facility] Commercial Insurance 0 / 0 [place]   1 [id number] ** 01/22/2025 01/21/2025 clonazePAM (Tablet) 60.0 30 2 MG [facility] Commercial Insurance 0 / 0 [place]   1 [id number] ** 12/23/2024 12/23/2024 clonazePAM (Tablet) 60.0 30 2 MG [facility] Medicaid 0 / 0 [place]   1 [id number] ** 11/19/2024 11/19/2024 clonazePAM (Tablet) 60.0 30 2 MG [facility] Medicaid 0 / 0 [place]   1 [id number] ** 10/23/2024 10/23/2024 clonazePAM (Tablet) 60.0 30 2 MG [facility] Medicaid 0 / 0 [place]   1 [id number] ** 09/25/2024 09/23/2024 clonazePAM (Tablet) 60.0 30 2 MG [facility] Medicaid 0 / 0 [place]   1 [id number] ** 08/29/2024 08/26/2024 clonazePAM (Tablet) 60.0 30 2 MG [facility] [place] 0 / 0 [place]

## 2025-06-23 NOTE — TELEPHONE ENCOUNTER
Medication: clonazePAM (KlonoPIN)     Dose/Frequency: Take 1 tablet (2 mg total) by mouth 2 (two) times a day     Quantity: 60    Pharmacy: [id number] - [place] - [address]     Office:   [] PCP/Provider -   [x] Speciality/Provider -     Does the patient have enough for 3 days?   [x] Yes   [] No - Send as HP to POD

## 2025-07-06 DIAGNOSIS — F41.9 ANXIETY: ICD-10-CM

## 2025-07-08 RX ORDER — QUETIAPINE FUMARATE 50 MG/1
50 TABLET, FILM COATED ORAL
Qty: 90 TABLET | Refills: 0 | Status: SHIPPED | OUTPATIENT
Start: 2025-07-08

## 2025-07-09 DIAGNOSIS — F33.1 MODERATE EPISODE OF RECURRENT MAJOR DEPRESSIVE DISORDER (HCC): ICD-10-CM

## 2025-07-10 RX ORDER — DULOXETIN HYDROCHLORIDE 30 MG/1
60 CAPSULE, DELAYED RELEASE ORAL 2 TIMES DAILY
Qty: 360 CAPSULE | Refills: 0 | Status: SHIPPED | OUTPATIENT
Start: 2025-07-10

## 2025-07-17 VITALS
BODY MASS INDEX: 32.78 KG/M2 | DIASTOLIC BLOOD PRESSURE: 90 MMHG | SYSTOLIC BLOOD PRESSURE: 170 MMHG | HEART RATE: 88 BPM | HEIGHT: 70 IN | WEIGHT: 229 LBS

## 2025-07-17 NOTE — BH TREATMENT PLAN
TREATMENT PLAN (Medication Management Only)         [facility]     Name and Date of Birth:  Juan José Corrales 64 y.o. 1960  Date of Treatment Plan: August 1, 2024, January 13, 2025, June 11, 2025  Diagnosis/Diagnoses:    1. OSITO (generalized anxiety disorder)    2. Episode of recurrent major depressive disorder, unspecified depression episode severity (HCC)       Strengths/Personal Resources for Self-Care: taking medications as prescribed, ability to communicate needs.  Area/Areas of need (in own words): anxiety, depression  1.Long Term Goal: increase mood stability.  Target Date:6 months - 12/11/2025  Person/Persons responsible for completion of goal: feli Can  2.         Short Term Objective (s) - How will we reach this goal?:   A. Provider new recommended medication/dosage changes and/or continue medication(s): continue current medications as prescribed.  B.  Coping skills .  C.  Adequate grass nutrition demonstration .  Target Date:6 months - 12/11/2025  Person/Persons Responsible for Completion of Goal: rebecca Can  Progress Towards Goals: continuing treatment  Treatment Modality: medication management every 3 months  Review due 180 days from date of this plan: 6 months - 12/11/2025  Expected length of service: maintenance  My Physician/PA/NP and I have developed this plan together and I agree to work on the goals and objectives. I understand the treatment goals that were developed for my treatment.

## 2025-07-17 NOTE — ASSESSMENT & PLAN NOTE
Cymbalta 30 mg two capsules twice a day  Juan José reports that he continues with situational depression regarding the divorce. Denies SI, functions at home. Takes medication as prescribed. Denies SE. Living in the home with estranged wife and it is stressful. Supportive therapy provided. Normalization of feelings and validation provided.

## 2025-07-17 NOTE — ASSESSMENT & PLAN NOTE
Klonopin 2mg BID prn anxiety, Baclofen 10 mg BID for muscle spasms and anxiety, Seroquel 100 mg take 2 at hs. Seroquel 50 mg in am. Trazodone 300 mg at hs.  Reports anxiety is situational due to divorce and what may happen. He believes his wife's son in law has hacked into his computer and changes information. Discussed problem solving and supportive therapy was provided. Takes medication as prescribed and denies SE, has a few friends that are supportive. Appetite and sleep are reported as good. AIMS is negative.

## 2025-07-17 NOTE — PSYCH
MEDICATION MANAGEMENT NOTE        [facility]      Name and Date of Birth:  Juan José Corrales 64 y.o. 1960 MRN: [id number]    Insurance: Payor: BRIJESH / Plan: [facility] HEALTH BENEFIT PLAN / Product Type: Fee for Service Commercial /     Date of Visit: June 11, 2025  This note was not shared with the patient due to this is a psychotherapy note  Reason for Visit:   Chief Complaint   Patient presents with   • Anxiety   • Depression   • Mood Swings   • Medication Management       Assessment & Plan  Moderate episode of recurrent major depressive disorder (HCC)  Cymbalta 30 mg two capsules twice a day  Juan José reports that he continues with situational depression regarding the divorce. Denies SI, functions at home. Takes medication as prescribed. Denies SE. Living in the home with estranged wife and it is stressful. Supportive therapy provided. Normalization of feelings and validation provided.       OSITO (generalized anxiety disorder)  Klonopin 2mg BID prn anxiety, Baclofen 10 mg BID for muscle spasms and anxiety, Seroquel 100 mg take 2 at hs. Seroquel 50 mg in am. Trazodone 300 mg at hs.  Reports anxiety is situational due to divorce and what may happen. He believes his wife's son in law has hacked into his computer and changes information. Discussed problem solving and supportive therapy was provided. Takes medication as prescribed and denies SE, has a few friends that are supportive. Appetite and sleep are reported as good. AIMS is negative.                 Risks/benefits/alternativies to treatment discussed, including potential adverse medication side effects, to which Juan José voiced understanding and consented fully to treatment.  Also, patient is amenable to calling/contacting the outpatient office including this writer if any acute adverse effects of their medication regimen arise in addition to any comments or concerns pertaining to their psychiatric management.       Medications Risks/Benefits      Risks, Benefits And Possible Side Effects Of Medications:    Risks, benefits, and possible side effects of medications explained to Juan José and he verbalizes understanding and agreement for treatment.    Controlled Medication Discussion:     Juan José has been filling controlled prescriptions on time as prescribed according to [place] Prescription Drug Monitoring Program         Subjective      Juan José Corrales is a 64 y.o. male, visited for Anxiety, Depression, Mood Swings, and Medication Management, who was personally seen and evaluated today at the [facility] outpatient clinic for follow-up and medication management. Completes psychiatric assessment without difficulty.     At previous outpatient psychiatric appointment with this writer, Juan José reports anxiety continues regarding impending divorce. Medication helps control anxiety and panic. Reports they are still in their home and estranged wife does not talk to him. He is distressed due to not knowing what will happen in future. Supportive therapy provided. Depression is reported, denies SI, appetite is fair and is sleeping Seroquel helps, AIMS negative. Cymbalta and trazodone are reported as effective.  Has a few friends who are supportive. He denies any current adverse medication side effects.      Overall, Juan José Continues with situational anxiety regarding divorce and sequelae of events that occur thereafter. Medications are effective to maintain mood;however, he continues to experience situations that are anxiety provoking. Coping skills were discussed. Reviewed past lab work, AIMS is negative. Supportive care provided. Discussed options for his thoughts about his DAIN hacked his computer. Takes medication as prescribed. Denies SE. Depression is situational, denies SI.              Review Of Systems:  Pertinent items are noted in HPI; all others are negative; no recent changes in medications or  health status reported.    PHQ-2/9 Depression Screening               Historical Information    Past Psychiatric History Update:   - No inpatient psychiatric admission since last encounter  - No SA or SIB since last encounter  - No incidence of violent behavior since last encounter    Past Trauma History Update:   - No new onset of abuse or traumatic events since last encounter     Past Medical History:    Past Medical History[1]     Past Surgical History[1]  Allergies[1]    Substance Abuse History:    Social History     Substance and Sexual Activity   Alcohol Use Yes    Comment: rarely; social     Social History     Substance and Sexual Activity   Drug Use Yes   • Types: Marijuana    Comment: soically       Social History:    Social History     Socioeconomic History   • Marital status: /Civil Union     Spouse name: Not on file   • Number of children: Not on file   • Years of education: Not on file   • Highest education level: Not on file   Occupational History   • Occupation: retired     Tobacco Use   • Smoking status: Former   • Smokeless tobacco: Never   • Tobacco comments:     quit about 1-2 years ago.     Substance and Sexual Activity   • Alcohol use: Yes     Comment: rarely; social   • Drug use: Yes     Types: Marijuana     Comment: soically   • Sexual activity: Not Currently   Other Topics Concern   • Not on file   Social History Narrative    Daily coffee consumption     Social Drivers of Health     Financial Resource Strain: Not on file   Food Insecurity: Not on file   Transportation Needs: Not on file   Physical Activity: Not on file   Stress: Not on file   Social Connections: Not on file   Intimate Partner Violence: Not on file   Housing Stability: Not on file       Family Psychiatric History:     Family History[1]    History Review: The following portions of the patient's history were reviewed and updated as appropriate: allergies, current medications, past family history, past medical  "history, past social history, past surgical history and problem list.           Objective      Vital signs in last 24 hours:  Vitals:    07/17/25 0744   BP: 170/90   Pulse: 88   Weight: 104 kg (229 lb)   Height: 5' 10\" (1.778 m)       Mental Status Evaluation:    Appearance age appropriate, casually dressed   Behavior cooperative, calm   Speech normal rate, normal volume, normal pitch   Mood depressed, anxious   Affect normal range and intensity, appropriate   Thought Processes organized, goal directed   Associations intact associations   Thought Content no overt delusions   Perceptual Disturbances: no auditory hallucinations, no visual hallucinations   Abnormal Thoughts  Risk Potential Suicidal ideation - None  Homicidal ideation - None  Potential for aggression - No   Orientation oriented to: person, place, time/date, and situation   Memory recent and remote memory grossly intact   Consciousness alert and awake   Attention Span Concentration Span attention span and concentration are age appropriate   Intellect not formally assessed   Insight intact   Judgement intact   Muscle Strength and  Gait normal muscle strength and normal muscle tone, normal gait and normal balance   Motor activity no abnormal movements   Language no difficulty naming common objects   Fund of Knowledge adequate knowledge of current events   Pain none   Pain Scale 0             Current Outpatient Medications   Medication Sig Dispense Refill   • baclofen 10 mg tablet Take 1 tablet (10 mg total) by mouth 2 (two) times a day as needed for muscle spasms (and anxiety) 180 tablet 0   • clonazePAM (KlonoPIN) 2 mg tablet Take 1 tablet (2 mg total) by mouth 2 (two) times a day 60 tablet 0   • DULoxetine (CYMBALTA) 30 mg delayed release capsule TAKE 2 CAPSULES BY MOUTH 2 TIMES A DAY. 360 capsule 0   • ergocalciferol (VITAMIN D2) 50,000 units Take 1 capsule (50,000 Units total) by mouth once a week 12 capsule 0   • QUEtiapine (SEROquel) 100 mg tablet " TAKE 2 TABLETS (200 MG TOTAL) BY MOUTH DAILY AT BEDTIME 180 tablet 1   • QUEtiapine (SEROquel) 50 mg tablet TAKE 1 TABLET (50 MG TOTAL) BY MOUTH DAILY AFTER BREAKFAST 90 tablet 0   • traZODone (DESYREL) 300 MG tablet Take 1 tablet (300 mg total) by mouth daily at bedtime 90 tablet 0   • vitamin A 2250 MCG (7500 UT) capsule Take 7,500 Units by mouth daily       No current facility-administered medications for this visit.         Psychotherapy Provided:     Individual psychotherapy provided: Yes  Counseling was provided during the session today for 20 minutes.  Medications, treatment progress and treatment plan reviewed with Juan José.  Goals discussed during in session: maintain mood stability.   Coping strategies reviewed with Juan José.   Cognitive therapy was utilized during the session.  Crisis/safety plan discussed with Juan José. Crisis plan will be compiled next visit; however, he is aware who to call in crisis, call [phone] and go to ED if necessary  Updated treatment plan         Visit Time    Visit Start Time: 2:30   Visit Stop Time: 3:00  Total Visit Duration: 30 minutes    Emily London, PhD 07/17/25    This note was completed in part utilizing Dragon dictation Software. Grammatical, translation, syntax errors, random word insertions, spelling mistakes, and incomplete sentences may be an occasional consequence of this system secondary to software limitations with voice recognition, ambient noise, and hardware issues. If you have any questions or concerns about the content, text, or information contained within the body of this dictation, please contact the provider for clarification.          [1]  Past Medical History:  Diagnosis Date   • Anxiety    • BPH (benign prostatic hyperplasia)    • Depressed    • Hayfever    • Inguinal hernia     last assessed: 1/21/16   • Mild depressive disorder     last assessed: 2/24/16   • Suicide attempt (HCC)    [1]  Past Surgical History:  Procedure Laterality Date   • HERNIA REPAIR  "Bilateral     5 months ago.    • KNEE ARTHROSCOPY Left     Possible ACL repair   • KNEE SURGERY     • ROTATOR CUFF REPAIR Left     about 1 year ago   [1]  Allergies  Allergen Reactions   • Amoxicillin Visual Disturbance     Reaction Date: 10Mar2008;    • Motrin [Ibuprofen]      Reaction Date: 10Mar2008;   Lips swelled   • Other      Pt \"I think the antibiotic is Avelox\"   [1]  Family History  Problem Relation Name Age of Onset   • Suicide Attempts Father     • Depression Father     • Hyperlipidemia Maternal Aunt     • Stroke Maternal Aunt     • Cervical cancer Family     "

## 2025-07-23 DIAGNOSIS — F41.9 ANXIETY: ICD-10-CM

## 2025-07-23 NOTE — TELEPHONE ENCOUNTER
Reason for call:   [x] Refill   [] Prior Auth  [] Other:     Office:   [] PCP/Provider -   [x] Specialty/Provider -     clonazePAM (KlonoPIN) 2 mg tablet 2 mg, Oral, 2 times daily       Quantity: 30    Pharmacy: [facility]   Does the patient have enough for 3 days?   [] Yes   [x] No - Send as HP to POD    Mail Away Pharmacy   Does the patient have enough for 10 days?   [] Yes   [] No - Send as HP to POD

## 2025-07-24 RX ORDER — CLONAZEPAM 2 MG/1
2 TABLET ORAL 2 TIMES DAILY
Qty: 60 TABLET | Refills: 0 | Status: SHIPPED | OUTPATIENT
Start: 2025-07-24

## 2025-07-24 NOTE — TELEPHONE ENCOUNTER
06/23/2025 06/23/2025 clonazePAM (Tablet) 60.0 30 2 MG NA [facility] Commercial [place] 0 / 0 [place]       1 [id number] ** 05/22/2025 05/22/2025 clonazePAM (Tablet) 60.0 30 2 MG NA [facility] Commercial Insurance 0 / 0 [place]    1 [id number] ** 04/22/2025 04/22/2025 clonazePAM (Tablet) 60.0 30 2 MG NA [facility]